# Patient Record
Sex: MALE | Race: WHITE | NOT HISPANIC OR LATINO | Employment: OTHER | URBAN - METROPOLITAN AREA
[De-identification: names, ages, dates, MRNs, and addresses within clinical notes are randomized per-mention and may not be internally consistent; named-entity substitution may affect disease eponyms.]

---

## 2017-06-12 ENCOUNTER — NON-PROVIDER VISIT (OUTPATIENT)
Dept: OCCUPATIONAL MEDICINE | Facility: CLINIC | Age: 82
End: 2017-06-12

## 2017-06-12 DIAGNOSIS — Z11.1 ENCOUNTER FOR PPD TEST: ICD-10-CM

## 2017-06-12 PROCEDURE — 86580 TB INTRADERMAL TEST: CPT | Performed by: PREVENTIVE MEDICINE

## 2017-06-12 NOTE — MR AVS SNAPSHOT
Joshua Nieves   2017 9:10 AM   Non-Provider Visit   MRN: [id number]    Department:  [facility]   Dept Phone:  [phone]    Description:  Male : 1933   Provider:  TORRIE HARRINGTON MA           Reason for Visit     PPD Placement           Allergies as of 2017     Not on File      You were diagnosed with     Encounter for PPD test   [447840]         Basic Information     Date Of Birth Sex Race Ethnicity Preferred Language    1933 Male White Non- English      Your appointments     2017  9:20 AM   Established Patient with Sourav Stevenson D.O.   [facility] ([facility])    [address]  [facility] [address] [phone]           You will be receiving a confirmation call a few days before your appointment from our automated call confirmation system.              Health Maintenance     Patient has no pending health maintenance at this time      Current Immunizations     Tuberculin Skin Test 2017  9:26 AM      Below and/or attached are the medications your provider expects you to take. Review all of your home medications and newly ordered medications with your provider and/or pharmacist. Follow medication instructions as directed by your provider and/or pharmacist. Please keep your medication list with you and share with your provider. Update the information when medications are discontinued, doses are changed, or new medications (including over-the-counter products) are added; and carry medication information at all times in the event of emergency situations     Allergies:  (Not on file)          Medications  Valid as of: 2017 - 11:03 AM    Generic Name Brand Name Tablet Size Instructions for use    .                 Medicines prescribed today were sent to:     None      Medication refill instructions:       If your prescription bottle indicates you have medication refills left, it is not necessary to call your provider’s office.  Please contact your pharmacy and they will refill your medication.    If your prescription bottle indicates you do not have any refills left, you may request refills at any time through one of the following ways: The online Azure Power system (except Urgent Care), by calling your provider’s office, or by asking your pharmacy to contact your provider’s office with a refill request. Medication refills are processed only during regular business hours and may not be available until the next business day. Your provider may request additional information or to have a follow-up visit with you prior to refilling your medication.   *Please Note: Medication refills are assigned a new Rx number when refilled electronically. Your pharmacy may indicate that no refills were authorized even though a new prescription for the same medication is available at the pharmacy. Please request the medicine by name with the pharmacy before contacting your provider for a refill.           Azure Power Access Code: [email or web address]  Expires: 7/12/2017 11:03 AM    Your email address is not on file at [facility].  Email Addresses are required for you to sign up for Azure Power, please contact [phone] to verify your personal information and to provide your email address prior to attempting to register for Azure Power.    Joshua Nieves  [address] [place] [address]    Azure Power  A secure, online tool to manage your health information     [facility]’s Azure Power® is a secure, online tool that connects you to your personalized health information from the privacy of your home -- day or night - making it very easy for you to manage your healthcare. Once the activation process is completed, you can even access your medical information using the Azure Power steve, which is available for free in the Apple Steve store or Google Play store.     To learn more about Azure Power, visit [email or web address] Power    There are two levels of access available (as shown below):    My Chart Features  [facility]  Specialists [facility]  Urgent  Care Non-[facility] Primary [facility] Doctor   Email your healthcare team securely and privately 24/7 X X X    Manage appointments: schedule your next appointment; view details of past/upcoming appointments X      Request prescription refills. X      View recent personal medical records, including lab and immunizations X X X X   View health record, including health history, allergies, medications X X X X   Read reports about your outpatient visits, procedures, consult and ER notes X X X X   See your discharge summary, which is a recap of your hospital and/or ER visit that includes your diagnosis, lab results, and care plan X X  X     How to register for Mowjow:  Once your e-mail address has been verified, follow the following steps to sign up for Mowjow.     1. Go to  [email or web address]  2. Click on the Sign Up Now box, which takes you to the New Member Sign Up page. You will need to provide the following information:  a. Enter your Mowjow Access Code exactly as it appears at the top of this page. (You will not need to use this code after you’ve completed the sign-up process. If you do not sign up before the expiration date, you must request a new code.)   b. Enter your date of birth.   c. Enter your home email address.   d. Click Submit, and follow the next screen’s instructions.  3. Create a Mowjow ID. This will be your Mowjow login ID and cannot be changed, so think of one that is secure and easy to remember.  4. Create a Mowjow password. You can change your password at any time.  5. Enter your Password Reset Question and Answer. This can be used at a later time if you forget your password.   6. Enter your e-mail address. This allows you to receive e-mail notifications when new information is available in Mowjow.  7. Click Sign Up. You can now view your health information.    For assistance activating your Mowjow account,  call [phone]

## 2017-06-14 ENCOUNTER — NON-PROVIDER VISIT (OUTPATIENT)
Dept: OCCUPATIONAL MEDICINE | Facility: CLINIC | Age: 82
End: 2017-06-14
Payer: MEDICARE

## 2017-06-14 LAB — TB WHEAL 3D P 5 TU DIAM: NORMAL MM

## 2017-06-19 ENCOUNTER — NON-PROVIDER VISIT (OUTPATIENT)
Dept: OCCUPATIONAL MEDICINE | Facility: CLINIC | Age: 82
End: 2017-06-19

## 2017-06-19 DIAGNOSIS — Z11.1 ENCOUNTER FOR PPD TEST: ICD-10-CM

## 2017-06-19 PROCEDURE — 86580 TB INTRADERMAL TEST: CPT | Performed by: PREVENTIVE MEDICINE

## 2017-06-21 ENCOUNTER — NON-PROVIDER VISIT (OUTPATIENT)
Dept: OCCUPATIONAL MEDICINE | Facility: CLINIC | Age: 82
End: 2017-06-21
Payer: MEDICARE

## 2017-06-21 DIAGNOSIS — Z11.1 ENCOUNTER FOR PPD SKIN TEST READING: ICD-10-CM

## 2017-06-21 LAB — TB WHEAL 3D P 5 TU DIAM: NORMAL MM

## 2017-06-22 ENCOUNTER — OFFICE VISIT (OUTPATIENT)
Dept: MEDICAL GROUP | Facility: CLINIC | Age: 82
End: 2017-06-22
Payer: MEDICARE

## 2017-06-22 VITALS
HEART RATE: 81 BPM | HEIGHT: 68 IN | SYSTOLIC BLOOD PRESSURE: 125 MMHG | DIASTOLIC BLOOD PRESSURE: 85 MMHG | RESPIRATION RATE: 18 BRPM | WEIGHT: 193.3 LBS | BODY MASS INDEX: 29.3 KG/M2 | OXYGEN SATURATION: 96 % | TEMPERATURE: 98.2 F

## 2017-06-22 DIAGNOSIS — Z76.89 ESTABLISHING CARE WITH NEW DOCTOR, ENCOUNTER FOR: ICD-10-CM

## 2017-06-22 DIAGNOSIS — Z98.84 HISTORY OF LAPAROSCOPIC ADJUSTABLE GASTRIC BANDING: ICD-10-CM

## 2017-06-22 DIAGNOSIS — I25.10 CARDIOVASCULAR DISEASE: ICD-10-CM

## 2017-06-22 DIAGNOSIS — E78.5 DYSLIPIDEMIA: ICD-10-CM

## 2017-06-22 DIAGNOSIS — I10 ESSENTIAL HYPERTENSION: ICD-10-CM

## 2017-06-22 DIAGNOSIS — R41.3 MEMORY CHANGES: ICD-10-CM

## 2017-06-22 PROCEDURE — 99203 OFFICE O/P NEW LOW 30 MIN: CPT | Performed by: INTERNAL MEDICINE

## 2017-06-22 RX ORDER — ATORVASTATIN CALCIUM 40 MG/1
40 TABLET, FILM COATED ORAL DAILY
Qty: 90 TAB | Refills: 3 | Status: SHIPPED | OUTPATIENT
Start: 2017-06-22 | End: 2017-08-08 | Stop reason: SDUPTHER

## 2017-06-22 RX ORDER — LOSARTAN POTASSIUM 50 MG/1
50 TABLET ORAL DAILY
Qty: 90 TAB | Refills: 3 | Status: SHIPPED | OUTPATIENT
Start: 2017-06-22 | End: 2017-06-22

## 2017-06-22 ASSESSMENT — PATIENT HEALTH QUESTIONNAIRE - PHQ9
CLINICAL INTERPRETATION OF PHQ2 SCORE: 6
5. POOR APPETITE OR OVEREATING: 0 - NOT AT ALL
SUM OF ALL RESPONSES TO PHQ QUESTIONS 1-9: 9

## 2017-06-22 NOTE — PROGRESS NOTES
CC: Joshua Nieves is a 84 y.o. male is suffering from   Chief Complaint   Patient presents with   • Annual Exam     Physical Exam          SUBJECTIVE:  1. Establishing care with new doctor, encounter for  Joshua is here for establishment of care, as previously been followed in [facility] by his previous physician. Patient has significant memory issues, is being cared for by his son who is helping to fill in his history.     2. Memory changes  Patient has a history of memory changes, is to be attending [employer]. Paperwork was completed today in the office regarding communicable diseases, 2 step TB test was previously completed and is negative.      3. Dyslipidemia  Patient has a history of dyslipidemia, has been on atorvastatin 40 mg, we've discussed that he has not been on this medication for over 2 months prescriptions were rewritten.     4. Essential hypertension  Patient with a history of essential hypertension, previously on Cozaar 50 mg. Patient's blood pressure today is totally adequate I recommended against restarting medication.     5. Cardiovascular disease  Patient is status post cardiac stents ×2, appears to be clinically stable has been followed by cardiology in [facility].     6. History of laparoscopic adjustable gastric banding  Patient with a history of gastric lap band, he appears to be clinically stable I've recommended screening for vitamin deficiencies        Past social, family, history:   Social History   Substance Use Topics   • Smoking status: Former Smoker     Quit date: 01/01/1987   • Smokeless tobacco: Never Used   • Alcohol Use: No         MEDICATIONS:    Current outpatient prescriptions:   •  atorvastatin (LIPITOR) 40 MG Tab, Take 1 Tab by mouth every day., Disp: 90 Tab, Rfl: 3    PROBLEMS:  There are no active problems to display for this patient.      REVIEW OF SYSTEMS:  Gen.:  No Nausea, Vomiting, fever, Chills.  Heart: No chest pain.  Lungs:  No  "shortness of Breath.  Psychological: History of dementia, Anjel unusual Anxiety depression     PHYSICAL EXAM   Constitutional: Alert, cooperative, not in acute distress.  Cardiovascular:  Rate Rhythm is regular without murmurs rubs clicks.     Thorax & Lungs: Clear to auscultation, no wheezing, rhonchi, or rales  HENT: Normocephalic, Atraumatic.  Eyes: PERRLA, EOMI, Conjunctiva normal.   Neck: Trachia is midline no swelling of the thyroid.   Lymphatic: No lymphadenopathy noted.   Neurologic: Alert & oriented x 3, cranial nerves II through XII are intact, Normal motor function, Normal sensory function, No focal deficits noted.   Psychiatric: Affect normal, Judgment normal, Mood normal.     VITAL SIGNS:/85 mmHg  Pulse 81  Temp(Src) 36.8 °C (98.2 °F)  Resp 18  Ht 1.727 m (5' 8\")  Wt 87.68 kg (193 lb 4.8 oz)  BMI 29.40 kg/m2  SpO2 96%    Labs: Reviewed    Assessment:                                                     Plan:    1. Establishing care with new doctor, encounter for  Patient appears to be clinically stable, no change in medical therapy except for the reinitiation of atorvastatin.     2. Memory changes  Etiology uncertain referral written to neurology  - COMP METABOLIC PANEL; Future  - CBC WITH DIFFERENTIAL; Future  - FREE THYROXINE; Future  - TSH; Future  - REFERRAL TO NEUROLOGY  - FREE THYROXINE; Future  - TSH; Future    3. Dyslipidemia  Patient with a history of dyslipidemia recheck cholesterol reinitiate Lipitor.   - atorvastatin (LIPITOR) 40 MG Tab; Take 1 Tab by mouth every day.  Dispense: 90 Tab; Refill: 3  - LIPID PROFILE; Future    4. Essential hypertension  No changes in medical therapy    5. Cardiovascular disease  Referral to cardiology  - REFERRAL TO CARDIOLOGY    6. History of laparoscopic adjustable gastric banding  Stable recheck vitamin D B12 checked comprehensive metabolic panel  - VITAMIN D,25 HYDROXY; Future  - VITAMIN B12; Future  - COMP METABOLIC PANEL; Future          "

## 2017-08-02 ENCOUNTER — HOSPITAL ENCOUNTER (OUTPATIENT)
Dept: LAB | Facility: MEDICAL CENTER | Age: 82
End: 2017-08-02
Attending: INTERNAL MEDICINE
Payer: MEDICARE

## 2017-08-02 DIAGNOSIS — Z98.84 HISTORY OF LAPAROSCOPIC ADJUSTABLE GASTRIC BANDING: ICD-10-CM

## 2017-08-02 DIAGNOSIS — R41.3 MEMORY CHANGES: ICD-10-CM

## 2017-08-02 DIAGNOSIS — E78.5 DYSLIPIDEMIA: ICD-10-CM

## 2017-08-02 LAB
25(OH)D3 SERPL-MCNC: 9 NG/ML (ref 30–100)
ALBUMIN SERPL BCP-MCNC: 3.7 G/DL (ref 3.2–4.9)
ALBUMIN/GLOB SERPL: 1.2 G/DL
ALP SERPL-CCNC: 73 U/L (ref 30–99)
ALT SERPL-CCNC: 9 U/L (ref 2–50)
ANION GAP SERPL CALC-SCNC: 10 MMOL/L (ref 0–11.9)
AST SERPL-CCNC: 14 U/L (ref 12–45)
BASOPHILS # BLD AUTO: 1.2 % (ref 0–1.8)
BASOPHILS # BLD: 0.07 K/UL (ref 0–0.12)
BILIRUB SERPL-MCNC: 0.6 MG/DL (ref 0.1–1.5)
BUN SERPL-MCNC: 13 MG/DL (ref 8–22)
CALCIUM SERPL-MCNC: 8.9 MG/DL (ref 8.5–10.5)
CHLORIDE SERPL-SCNC: 105 MMOL/L (ref 96–112)
CHOLEST SERPL-MCNC: 209 MG/DL (ref 100–199)
CO2 SERPL-SCNC: 25 MMOL/L (ref 20–33)
CREAT SERPL-MCNC: 1.11 MG/DL (ref 0.5–1.4)
EOSINOPHIL # BLD AUTO: 0.2 K/UL (ref 0–0.51)
EOSINOPHIL NFR BLD: 3.5 % (ref 0–6.9)
ERYTHROCYTE [DISTWIDTH] IN BLOOD BY AUTOMATED COUNT: 49.1 FL (ref 35.9–50)
GFR SERPL CREATININE-BSD FRML MDRD: >60 ML/MIN/1.73 M 2
GLOBULIN SER CALC-MCNC: 3 G/DL (ref 1.9–3.5)
GLUCOSE SERPL-MCNC: 81 MG/DL (ref 65–99)
HCT VFR BLD AUTO: 47.8 % (ref 42–52)
HDLC SERPL-MCNC: 36 MG/DL
HGB BLD-MCNC: 15.5 G/DL (ref 14–18)
IMM GRANULOCYTES # BLD AUTO: 0.02 K/UL (ref 0–0.11)
IMM GRANULOCYTES NFR BLD AUTO: 0.3 % (ref 0–0.9)
LDLC SERPL CALC-MCNC: 99 MG/DL
LYMPHOCYTES # BLD AUTO: 1.05 K/UL (ref 1–4.8)
LYMPHOCYTES NFR BLD: 18.2 % (ref 22–41)
MCH RBC QN AUTO: 32.6 PG (ref 27–33)
MCHC RBC AUTO-ENTMCNC: 32.4 G/DL (ref 33.7–35.3)
MCV RBC AUTO: 100.6 FL (ref 81.4–97.8)
MONOCYTES # BLD AUTO: 0.68 K/UL (ref 0–0.85)
MONOCYTES NFR BLD AUTO: 11.8 % (ref 0–13.4)
NEUTROPHILS # BLD AUTO: 3.76 K/UL (ref 1.82–7.42)
NEUTROPHILS NFR BLD: 65 % (ref 44–72)
NRBC # BLD AUTO: 0 K/UL
NRBC BLD AUTO-RTO: 0 /100 WBC
PLATELET # BLD AUTO: 186 K/UL (ref 164–446)
PMV BLD AUTO: 11.6 FL (ref 9–12.9)
POTASSIUM SERPL-SCNC: 4.3 MMOL/L (ref 3.6–5.5)
PROT SERPL-MCNC: 6.7 G/DL (ref 6–8.2)
RBC # BLD AUTO: 4.75 M/UL (ref 4.7–6.1)
SODIUM SERPL-SCNC: 140 MMOL/L (ref 135–145)
T4 FREE SERPL-MCNC: 0.79 NG/DL (ref 0.53–1.43)
TRIGL SERPL-MCNC: 370 MG/DL (ref 0–149)
TSH SERPL DL<=0.005 MIU/L-ACNC: 1.9 UIU/ML (ref 0.3–3.7)
VIT B12 SERPL-MCNC: 379 PG/ML (ref 211–911)
WBC # BLD AUTO: 5.8 K/UL (ref 4.8–10.8)

## 2017-08-02 PROCEDURE — 80053 COMPREHEN METABOLIC PANEL: CPT

## 2017-08-02 PROCEDURE — 36415 COLL VENOUS BLD VENIPUNCTURE: CPT

## 2017-08-02 PROCEDURE — 82306 VITAMIN D 25 HYDROXY: CPT

## 2017-08-02 PROCEDURE — 84443 ASSAY THYROID STIM HORMONE: CPT

## 2017-08-02 PROCEDURE — 80061 LIPID PANEL: CPT

## 2017-08-02 PROCEDURE — 84439 ASSAY OF FREE THYROXINE: CPT

## 2017-08-02 PROCEDURE — 82607 VITAMIN B-12: CPT

## 2017-08-02 PROCEDURE — 85025 COMPLETE CBC W/AUTO DIFF WBC: CPT

## 2017-08-03 ENCOUNTER — TELEPHONE (OUTPATIENT)
Dept: MEDICAL GROUP | Facility: CLINIC | Age: 82
End: 2017-08-03

## 2017-08-03 NOTE — TELEPHONE ENCOUNTER
ESTABLISHED PATIENT PRE-VISIT PLANNING     Note: Patient will not be contacted if there is no indication to call.     1.  Reviewed notes from the last few office visits within the medical group: Yes    2.  If any orders were placed at last visit or intended to be done for this visit (i.e. 6 mos follow-up), do we have Results/Consult Notes?        •  Labs - Labs ordered, completed and results are in chart.       •  Imaging - Imaging was not ordered at last office visit.       •  Referrals - Referral ordered, patient was seen and consult notes are in chart. Care Teams updated  YES. Neurology scheduled 10/19/17    3. Is this appointment scheduled as a Hospital Follow-Up? No    4.  Immunizations were updated in Epic using WebIZ?: No WebIZ record      5.  Patient is due for the following Health Maintenance Topics:   Health Maintenance Due   Topic Date Due   • Annual Wellness Visit  04/30/1933   • IMM DTaP/Tdap/Td Vaccine (1 - Tdap) 04/30/1952   • COLONOSCOPY  04/30/1983   • IMM ZOSTER VACCINE  04/30/1993   • IMM PNEUMOCOCCAL 65+ (ADULT) LOW/MEDIUM RISK SERIES (1 of 2 - PCV13) 04/30/1998           6.  Patient was NOT informed to arrive 15 min prior to their scheduled appointment and bring in their medication bottles.

## 2017-08-04 ENCOUNTER — HOSPITAL ENCOUNTER (OUTPATIENT)
Facility: MEDICAL CENTER | Age: 82
End: 2017-08-04
Attending: PHYSICIAN ASSISTANT
Payer: MEDICARE

## 2017-08-04 ENCOUNTER — OFFICE VISIT (OUTPATIENT)
Dept: MEDICAL GROUP | Facility: CLINIC | Age: 82
End: 2017-08-04
Payer: MEDICARE

## 2017-08-04 ENCOUNTER — OFFICE VISIT (OUTPATIENT)
Dept: URGENT CARE | Facility: CLINIC | Age: 82
End: 2017-08-04
Payer: MEDICARE

## 2017-08-04 VITALS
BODY MASS INDEX: 29.33 KG/M2 | OXYGEN SATURATION: 98 % | HEIGHT: 68 IN | RESPIRATION RATE: 18 BRPM | DIASTOLIC BLOOD PRESSURE: 74 MMHG | SYSTOLIC BLOOD PRESSURE: 122 MMHG | WEIGHT: 193.5 LBS | HEART RATE: 72 BPM | TEMPERATURE: 98.2 F

## 2017-08-04 VITALS
BODY MASS INDEX: 29.25 KG/M2 | HEIGHT: 68 IN | TEMPERATURE: 97.9 F | HEART RATE: 74 BPM | OXYGEN SATURATION: 98 % | DIASTOLIC BLOOD PRESSURE: 90 MMHG | SYSTOLIC BLOOD PRESSURE: 140 MMHG | WEIGHT: 193 LBS | RESPIRATION RATE: 16 BRPM

## 2017-08-04 DIAGNOSIS — R41.0 DISORIENTATION: ICD-10-CM

## 2017-08-04 DIAGNOSIS — E55.9 VITAMIN D DEFICIENCY DISEASE: ICD-10-CM

## 2017-08-04 DIAGNOSIS — R79.89 ABNORMAL CBC: ICD-10-CM

## 2017-08-04 DIAGNOSIS — R41.3 MEMORY CHANGE: ICD-10-CM

## 2017-08-04 DIAGNOSIS — R41.0 DISORIENTATION: Primary | ICD-10-CM

## 2017-08-04 DIAGNOSIS — Z86.59 HISTORY OF DEMENTIA: ICD-10-CM

## 2017-08-04 DIAGNOSIS — R46.89 BEHAVIOR CONCERN: ICD-10-CM

## 2017-08-04 DIAGNOSIS — G47.00 INSOMNIA, UNSPECIFIED TYPE: ICD-10-CM

## 2017-08-04 LAB
APPEARANCE UR: CLEAR
BILIRUB UR STRIP-MCNC: NORMAL MG/DL
COLOR UR AUTO: NORMAL
GLUCOSE UR STRIP.AUTO-MCNC: NORMAL MG/DL
KETONES UR STRIP.AUTO-MCNC: NORMAL MG/DL
LEUKOCYTE ESTERASE UR QL STRIP.AUTO: NORMAL
NITRITE UR QL STRIP.AUTO: NORMAL
PH UR STRIP.AUTO: 5 [PH] (ref 5–8)
PROT UR QL STRIP: 100 MG/DL
RBC UR QL AUTO: NORMAL
SP GR UR STRIP.AUTO: 1.02
UROBILINOGEN UR STRIP-MCNC: NORMAL MG/DL

## 2017-08-04 PROCEDURE — 99214 OFFICE O/P EST MOD 30 MIN: CPT | Performed by: INTERNAL MEDICINE

## 2017-08-04 PROCEDURE — 99204 OFFICE O/P NEW MOD 45 MIN: CPT | Performed by: PHYSICIAN ASSISTANT

## 2017-08-04 PROCEDURE — 87086 URINE CULTURE/COLONY COUNT: CPT

## 2017-08-04 PROCEDURE — 81002 URINALYSIS NONAUTO W/O SCOPE: CPT | Performed by: PHYSICIAN ASSISTANT

## 2017-08-04 RX ORDER — CIPROFLOXACIN 500 MG/1
500 TABLET, FILM COATED ORAL EVERY 12 HOURS
Qty: 14 TAB | Refills: 0 | Status: SHIPPED | OUTPATIENT
Start: 2017-08-04 | End: 2017-08-04 | Stop reason: CLARIF

## 2017-08-04 RX ORDER — TRAZODONE HYDROCHLORIDE 50 MG/1
50 TABLET ORAL NIGHTLY PRN
Qty: 30 TAB | Refills: 3 | Status: SHIPPED | OUTPATIENT
Start: 2017-08-04 | End: 2017-09-22

## 2017-08-04 NOTE — PROGRESS NOTES
CC: Joshua Nieves is a 84 y.o. male is suffering from   Chief Complaint   Patient presents with   • Follow-Up         SUBJECTIVE:  1. Insomnia, unspecified type  Joshua is here today accompanied by his son, we have discussed that he is having problems with insomnia, son is recommended that he restart trazodone which was done today.     2. Memory change  Patient continues to have memory issues, is in a  program, seems to really enjoy it.     3. Vitamin D deficiency disease  Patient will vitamin D deficiency recommended he start 1000 international units each day    4. Abnormal CBC  Patient with an abnormal CBC orders written to recheck this        Past social, family, history: , lives with his son very supportive  Social History   Substance Use Topics   • Smoking status: Former Smoker     Quit date: 01/01/1987   • Smokeless tobacco: Never Used   • Alcohol Use: No         MEDICATIONS:    Current outpatient prescriptions:   •  trazodone (DESYREL) 50 MG Tab, Take 1 Tab by mouth at bedtime as needed for Sleep., Disp: 30 Tab, Rfl: 3  •  atorvastatin (LIPITOR) 40 MG Tab, Take 1 Tab by mouth every day., Disp: 90 Tab, Rfl: 3    PROBLEMS:  Patient Active Problem List    Diagnosis Date Noted   • Memory change 08/04/2017   • Vitamin D deficiency disease 08/04/2017   • Abnormal CBC 08/04/2017       REVIEW OF SYSTEMS:  Gen.:  No Nausea, Vomiting, fever, Chills.  Heart: No chest pain.  Lungs:  No shortness of Breath.  Psychological: Anjel unusual Anxiety depression     PHYSICAL EXAM   Constitutional: Alert, cooperative, not in acute distress.  Cardiovascular:  Rate Rhythm is regular without murmurs rubs clicks.     Thorax & Lungs: Clear to auscultation, no wheezing, rhonchi, or rales  HENT: Normocephalic, Atraumatic.  Eyes: PERRLA, EOMI, Conjunctiva normal.   Neck: Trachia is midline no swelling of the thyroid.   Lymphatic: No lymphadenopathy noted.   Neurologic: Alert & oriented x 3, cranial nerves II through XII  "are intact, Normal motor function, Normal sensory function, No focal deficits noted.   Psychiatric: Affect normal, Judgment normal, Mood normal.     VITAL SIGNS:/74 mmHg  Pulse 72  Temp(Src) 36.8 °C (98.2 °F)  Resp 18  Ht 1.727 m (5' 8\")  Wt 87.771 kg (193 lb 8 oz)  BMI 29.43 kg/m2  SpO2 98%    Labs: Reviewed    Assessment:                                                     Plan:    1. Insomnia, unspecified type  Start trazodone  - trazodone (DESYREL) 50 MG Tab; Take 1 Tab by mouth at bedtime as needed for Sleep.  Dispense: 30 Tab; Refill: 3    2. Memory change  No change in medical therapy    3. Vitamin D deficiency disease  Recheck vitamin D levels  - VITAMIN D,25 HYDROXY; Future    4. Abnormal CBC  Check RBC folate serum B12  - FOLATE; Future  - VITAMIN B12; Future          "

## 2017-08-04 NOTE — MR AVS SNAPSHOT
"        Joshua Escaleras   2017 8:00 AM   Office Visit   MRN: [id number]    Department:  [facility]   Dept Phone:  [phone]    Description:  Male : 1933   Provider:  Sourav Stevenson D.O.           Reason for Visit     Follow-Up           Allergies as of 2017     Not on File      You were diagnosed with     Insomnia, unspecified type   [9341959]       Memory change   [156465]       Vitamin D deficiency disease   [073009]       Abnormal CBC   [492522]         Vital Signs     Blood Pressure Pulse Temperature Respirations Height Weight    122/74 mmHg 72 36.8 °C (98.2 °F) 18 1.727 m (5' 8\") 87.771 kg (193 lb 8 oz)    Body Mass Index Oxygen Saturation Smoking Status             29.43 kg/m2 98% Former Smoker         Basic Information     Date Of Birth Sex Race Ethnicity Preferred Language    1933 Male White Non- English      Your appointments     Aug 08, 2017  3:45 PM   NEW PATIENT with Ced Horton M.D.   [facility] (--)    [address], [address]  [facility] [id number] [phone]            Oct 19, 2017  1:20 PM   New Patient with Nima Hsieh M.D.   [facility] (--)    [address], [address]  [place] [id number] [phone]           Please bring Photo ID, Insurance Cards, All Medication Bottles and copies of any legal documents (such as Living Will, Power of ) If speaking a language besides English please bring an adult . Please arrive 30 minutes prior for check in and registration. You will be receiving a confirmation call a few days before your appointment from our automated call confirmation system.            2017  8:00 AM   Established Patient with Sourav Stevenson D.O.   [facility] ([facility])    [address]  [place] [address] [phone]           You will be receiving a confirmation call a few days before your appointment from our " automated call confirmation system.              Problem List              ICD-10-CM Priority Class Noted - Resolved    Memory change R41.3   8/4/2017 - Present    Vitamin D deficiency disease E55.9   8/4/2017 - Present    Abnormal CBC R79.89   8/4/2017 - Present      Health Maintenance        Date Due Completion Dates    IMM DTaP/Tdap/Td Vaccine (1 - Tdap) 4/30/1952 ---    COLONOSCOPY 4/30/1983 ---    IMM ZOSTER VACCINE 4/30/1993 ---    IMM PNEUMOCOCCAL 65+ (ADULT) LOW/MEDIUM RISK SERIES (1 of 2 - PCV13) 4/30/1998 ---    IMM INFLUENZA (1) 9/1/2017 ---            Current Immunizations     Tuberculin Skin Test 6/19/2017  9:52 AM, 6/12/2017  9:26 AM      Below and/or attached are the medications your provider expects you to take. Review all of your home medications and newly ordered medications with your provider and/or pharmacist. Follow medication instructions as directed by your provider and/or pharmacist. Please keep your medication list with you and share with your provider. Update the information when medications are discontinued, doses are changed, or new medications (including over-the-counter products) are added; and carry medication information at all times in the event of emergency situations     Allergies:  No Known Allergies          Medications  Valid as of: August 04, 2017 -  8:33 AM    Generic Name Brand Name Tablet Size Instructions for use    Atorvastatin Calcium (Tab) LIPITOR 40 MG Take 1 Tab by mouth every day.        TraZODone HCl (Tab) DESYREL 50 MG Take 1 Tab by mouth at bedtime as needed for Sleep.        .                 Medicines prescribed today were sent to:     [id number]  [place] - [address]    [address] [place] [address]    Phone: [phone] Fax: [phone]    Open 24 Hours?: No      Medication refill instructions:       If your prescription bottle indicates you have medication refills left, it is not necessary to call your provider’s office. Please contact  your pharmacy and they will refill your medication.    If your prescription bottle indicates you do not have any refills left, you may request refills at any time through one of the following ways: The online MiFi system (except Urgent Care), by calling your provider’s office, or by asking your pharmacy to contact your provider’s office with a refill request. Medication refills are processed only during regular business hours and may not be available until the next business day. Your provider may request additional information or to have a follow-up visit with you prior to refilling your medication.   *Please Note: Medication refills are assigned a new Rx number when refilled electronically. Your pharmacy may indicate that no refills were authorized even though a new prescription for the same medication is available at the pharmacy. Please request the medicine by name with the pharmacy before contacting your provider for a refill.        Your To Do List     Future Labs/Procedures Complete By Expires    FOLATE  As directed 8/4/2018    VITAMIN B12  As directed 8/4/2018    VITAMIN D,25 HYDROXY  As directed 8/5/2018         MiFi Access Code: Activation code not generated  Current MiFi Status: Active

## 2017-08-04 NOTE — MR AVS SNAPSHOT
"        Joshua MAEVE Ezequiel   2017 5:15 PM   Office Visit   MRN: [id number]    Department:  [facility]   Dept Phone:  [phone]    Description:  Male : 1933   Provider:  Jacoby Singletary PA-C           Reason for Visit     Dysuria           Allergies as of 2017     Not on File      You were diagnosed with     Disorientation   [148063]  -  Primary     Behavior concern   [427916]       History of dementia   [9399670]         Vital Signs     Blood Pressure Pulse Temperature Respirations Height Weight    140/90 mmHg 74 36.6 °C (97.9 °F) 16 1.727 m (5' 8\") 87.544 kg (193 lb)    Body Mass Index Oxygen Saturation Smoking Status             29.35 kg/m2 98% Former Smoker         Basic Information     Date Of Birth Sex Race Ethnicity Preferred Language    1933 Male White Non- English      Your appointments     Aug 08, 2017  3:45 PM   NEW PATIENT with Ced Horton M.D.   [facility] (--)    [address] [facility] [address]  [place] [id number] [phone]            Oct 19, 2017  1:20 PM   New Patient with Nima Hsieh M.D.   [facility] (--)    [address] [facility], [address]  [place] [id number] [phone]           Please bring Photo ID, Insurance Cards, All Medication Bottles and copies of any legal documents (such as Living Will, Power of ) If speaking a language besides English please bring an adult . Please arrive 30 minutes prior for check in and registration. You will be receiving a confirmation call a few days before your appointment from our automated call confirmation system.            2017  8:00 AM   Established Patient with Sourav Stevenson D.O.   [facility] ([facility])    [address] [facility] [address]  [place] [id number] [phone]           You will be receiving a confirmation call a few days before your appointment from our automated call confirmation system.              "   Problem List              ICD-10-CM Priority Class Noted - Resolved    Memory change R41.3   8/4/2017 - Present    Vitamin D deficiency disease E55.9   8/4/2017 - Present    Abnormal CBC R79.89   8/4/2017 - Present      Health Maintenance        Date Due Completion Dates    IMM DTaP/Tdap/Td Vaccine (1 - Tdap) 4/30/1952 ---    COLONOSCOPY 4/30/1983 ---    IMM ZOSTER VACCINE 4/30/1993 ---    IMM PNEUMOCOCCAL 65+ (ADULT) LOW/MEDIUM RISK SERIES (1 of 2 - PCV13) 4/30/1998 ---    IMM INFLUENZA (1) 9/1/2017 ---            Results     POCT Urinalysis      Component Value Standard Range & Units    POC Color DEANA Negative    POC Appearance CLEAR Negative    POC Leukocyte Esterase NEG Negative    POC Nitrites NEG Negative    POC Urobiligen NEG Negative (0.2) mg/dL    POC Protein 100 Negative mg/dL    POC Urine PH 5.0 5.0 - 8.0    POC Blood NEG Negative    POC Specific Gravity 1.020 <1.005 - >1.030    POC Ketones NEG Negative mg/dL    POC Biliruben NEG Negative mg/dL    POC Glucose NEG Negative mg/dL                        Current Immunizations     Tuberculin Skin Test 6/19/2017  9:52 AM, 6/12/2017  9:26 AM      Below and/or attached are the medications your provider expects you to take. Review all of your home medications and newly ordered medications with your provider and/or pharmacist. Follow medication instructions as directed by your provider and/or pharmacist. Please keep your medication list with you and share with your provider. Update the information when medications are discontinued, doses are changed, or new medications (including over-the-counter products) are added; and carry medication information at all times in the event of emergency situations     Allergies:  No Known Allergies          Medications  Valid as of: August 04, 2017 -  7:07 PM    Generic Name Brand Name Tablet Size Instructions for use    Atorvastatin Calcium (Tab) LIPITOR 40 MG Take 1 Tab by mouth every day.        TraZODone HCl (Tab) DESYREL  50 MG Take 1 Tab by mouth at bedtime as needed for Sleep.        .                 Medicines prescribed today were sent to:     [id number] - [place], [place] - [address]    [address] [place] [address]    Phone: [phone] Fax: [phone]    Open 24 Hours?: No      Medication refill instructions:       If your prescription bottle indicates you have medication refills left, it is not necessary to call your provider’s office. Please contact your pharmacy and they will refill your medication.    If your prescription bottle indicates you do not have any refills left, you may request refills at any time through one of the following ways: The online [employer] Interactive system (except Urgent Care), by calling your provider’s office, or by asking your pharmacy to contact your provider’s office with a refill request. Medication refills are processed only during regular business hours and may not be available until the next business day. Your provider may request additional information or to have a follow-up visit with you prior to refilling your medication.   *Please Note: Medication refills are assigned a new Rx number when refilled electronically. Your pharmacy may indicate that no refills were authorized even though a new prescription for the same medication is available at the pharmacy. Please request the medicine by name with the pharmacy before contacting your provider for a refill.        Your To Do List     Future Labs/Procedures Complete By Expires    Urine Culture  As directed 8/4/2018      Referral     A referral request has been sent to our patient care coordination department. Please allow 3-5 business days for us to process this request and contact you either by phone or mail. If you do not hear from us by the 5th business day, please call us at [phone].        Instructions      Urinary Tract Infection  A urinary tract infection (UTI) can occur any place along the urinary tract. The tract includes the  kidneys, ureters, bladder, and urethra. A type of germ called bacteria often causes a UTI. UTIs are often helped with antibiotic medicine.   HOME CARE   · If given, take antibiotics as told by your doctor. Finish them even if you start to feel better.  · Drink enough fluids to keep your pee (urine) clear or pale yellow.  · Avoid tea, drinks with caffeine, and bubbly (carbonated) drinks.  · Pee often. Avoid holding your pee in for a long time.  · Pee before and after having sex (intercourse).  · Wipe from front to back after you poop (bowel movement) if you are a woman. Use each tissue only once.  GET HELP RIGHT AWAY IF:   · You have back pain.  · You have lower belly (abdominal) pain.  · You have chills.  · You feel sick to your stomach (nauseous).  · You throw up (vomit).  · Your burning or discomfort with peeing does not go away.  · You have a fever.  · Your symptoms are not better in 3 days.  MAKE SURE YOU:   · Understand these instructions.  · Will watch your condition.  · Will get help right away if you are not doing well or get worse.     This information is not intended to replace advice given to you by your health care provider. Make sure you discuss any questions you have with your health care provider.     Document Released: 06/05/2009 Document Revised: 01/08/2016 Document Reviewed: 07/18/2013  [employer] Interactive Patient Education ©2016 [employer] Inc.            [employer] Access Code: Activation code not generated  Current [employer] Status: Active

## 2017-08-05 NOTE — PROGRESS NOTES
Subjective:      Pt is a 84 y.o. male who presents with Dysuria            Dysuria   This is a new problem. The current episode started yesterday. The problem occurs every urination. The problem has been gradually worsening. The quality of the pain is described as aching and burning. The pain is at a severity of 5/10. The pain is moderate. There has been no fever. He has tried increased fluids for the symptoms. The treatment provided no relief. His past medical history is significant for recurrent UTIs. There is no history of catheterization, kidney stones, a single kidney, urinary stasis or a urological procedure.   PT comes into the  with a chief complaint of mental status changes and mild dysuria x 2 weeks. PT denies fevers or chills, CP, SOB, NVD, paresthesias, headaches, dizziness, change in vision, hives, or joint pain. PT states the pain is a 5/10 with urination, aching in nature and worse at night. He states he has not taken any meds for this issue. He denies flank or back pain as well. His son notes pt has dementia but has had Mental Status changes at his nursing home most likely due to not having his medications for CARD, Neuro and dementia x 2 months due to error at nursing home. The pt's medication list, problem list, and allergies have been evaluated and reviewed during today's visit.        PMH:  Past Medical History   Diagnosis Date   • Memory change 8/4/2017   • Vitamin D deficiency disease 8/4/2017   • Abnormal CBC 8/4/2017       PSH:  History reviewed. No pertinent past surgical history.    Fam Hx:  the patient's family history is not pertinent to their current complaint        Soc HX:  Social History     Social History   • Marital Status:      Spouse Name: N/A   • Number of Children: N/A   • Years of Education: N/A     Occupational History   • Not on file.     Social History Main Topics   • Smoking status: Former Smoker     Quit date: 01/01/1987   • Smokeless tobacco: Never Used   •  Alcohol Use: No   • Drug Use: No   • Sexual Activity: No     Other Topics Concern   • Not on file     Social History Narrative         Medications:    Current outpatient prescriptions:   •  ciprofloxacin (CIPRO) 500 MG Tab, Take 1 Tab by mouth every 12 hours for 7 days., Disp: 14 Tab, Rfl: 0  •  trazodone (DESYREL) 50 MG Tab, Take 1 Tab by mouth at bedtime as needed for Sleep., Disp: 30 Tab, Rfl: 3  •  atorvastatin (LIPITOR) 40 MG Tab, Take 1 Tab by mouth every day., Disp: 90 Tab, Rfl: 3      Allergies:  Review of patient's allergies indicates not on file.        Review of Systems   Genitourinary: Positive for dysuria.   Constitutional: Negative for fever, chills and malaise/fatigue.   HENT: Negative for congestion and sore throat.    Eyes: Negative for blurred vision, double vision and photophobia.   Respiratory: Negative for cough and shortness of breath.    Cardiovascular: Negative for chest pain and palpitations.   Gastrointestinal: Negative for heartburn, nausea, vomiting, abdominal pain, diarrhea and constipation.   Musculoskeletal: Negative for joint pain and myalgias.   Skin: Negative for itching and rash.   Neurological: Negative for dizziness, tingling and headaches.   Endo/Heme/Allergies: Does not bruise/bleed easily.   Psychiatric/Behavioral: Negative for depression. The patient is not nervous/anxious.             Objective:     VSS, AF    Physical Exam      Constitutional: PT is oriented to person, place, and time. PT appears well-developed and well-nourished. No distress.   HENT:   Head: Normocephalic and atraumatic.   Mouth/Throat: Oropharynx is clear and moist. No oropharyngeal exudate.   Eyes: Conjunctivae normal and EOM are normal. Pupils are equal, round, and reactive to light.   Neck: Normal range of motion. Neck supple. No thyromegaly present.   Cardiovascular: Normal rate, regular rhythm, normal heart sounds and intact distal pulses.  Exam reveals no gallop and no friction rub.    No murmur  heard.  Pulmonary/Chest: Effort normal and breath sounds normal. No respiratory distress. PT has no wheezes. PT has no rales. Pt exhibits no tenderness.   Abdominal: Soft. Bowel sounds are normal. PT exhibits no distension and no mass. There is no tenderness. There is no rebound and no guarding.   Musculoskeletal: Normal range of motion. PT exhibits no edema and no tenderness.   Neurological: PT is alert and oriented to person, place, and time. PT has normal reflexes. No cranial nerve deficit.   Skin: Skin is warm and dry. No rash noted. PT is not diaphoretic. No erythema.       Psychiatric: PT has a normal mood and affect. PT behavior is normal. Judgment and thought content normal.          Assessment/Plan:     1. Mental status changes - acute      - POCT Urinalysis-->protein      Pt to follow up with [facility] in 4 days and I made a stat referral to Neurology  Rest, fluids encouraged.  AVS with medical info given.  Pt was in full understanding and agreement with the plan.  Follow-up as needed if symptoms worsen or fail to improve.

## 2017-08-05 NOTE — PATIENT INSTRUCTIONS
Urinary Tract Infection  A urinary tract infection (UTI) can occur any place along the urinary tract. The tract includes the kidneys, ureters, bladder, and urethra. A type of germ called bacteria often causes a UTI. UTIs are often helped with antibiotic medicine.   HOME CARE   · If given, take antibiotics as told by your doctor. Finish them even if you start to feel better.  · Drink enough fluids to keep your pee (urine) clear or pale yellow.  · Avoid tea, drinks with caffeine, and bubbly (carbonated) drinks.  · Pee often. Avoid holding your pee in for a long time.  · Pee before and after having sex (intercourse).  · Wipe from front to back after you poop (bowel movement) if you are a woman. Use each tissue only once.  GET HELP RIGHT AWAY IF:   · You have back pain.  · You have lower belly (abdominal) pain.  · You have chills.  · You feel sick to your stomach (nauseous).  · You throw up (vomit).  · Your burning or discomfort with peeing does not go away.  · You have a fever.  · Your symptoms are not better in 3 days.  MAKE SURE YOU:   · Understand these instructions.  · Will watch your condition.  · Will get help right away if you are not doing well or get worse.     This information is not intended to replace advice given to you by your health care provider. Make sure you discuss any questions you have with your health care provider.     Document Released: 06/05/2009 Document Revised: 01/08/2016 Document Reviewed: 07/18/2013  [employer] Interactive Patient Education ©2016 [employer]

## 2017-08-06 LAB
BACTERIA UR CULT: NORMAL
SIGNIFICANT IND 70042: NORMAL
SOURCE SOURCE: NORMAL

## 2017-08-08 ENCOUNTER — OFFICE VISIT (OUTPATIENT)
Dept: CARDIOLOGY | Facility: MEDICAL CENTER | Age: 82
End: 2017-08-08
Payer: MEDICARE

## 2017-08-08 VITALS
HEART RATE: 88 BPM | DIASTOLIC BLOOD PRESSURE: 80 MMHG | HEIGHT: 68 IN | OXYGEN SATURATION: 94 % | WEIGHT: 197 LBS | SYSTOLIC BLOOD PRESSURE: 138 MMHG | BODY MASS INDEX: 29.86 KG/M2

## 2017-08-08 DIAGNOSIS — E78.00 PURE HYPERCHOLESTEROLEMIA: ICD-10-CM

## 2017-08-08 DIAGNOSIS — I20.89 ANGINA AT REST: ICD-10-CM

## 2017-08-08 DIAGNOSIS — I25.10 CARDIOVASCULAR DISEASE: ICD-10-CM

## 2017-08-08 DIAGNOSIS — E78.5 DYSLIPIDEMIA: ICD-10-CM

## 2017-08-08 DIAGNOSIS — I25.10 CORONARY ARTERY DISEASE INVOLVING NATIVE CORONARY ARTERY OF NATIVE HEART WITHOUT ANGINA PECTORIS: ICD-10-CM

## 2017-08-08 DIAGNOSIS — I25.119 ATHEROSCLEROSIS OF NATIVE CORONARY ARTERY WITH ANGINA PECTORIS, UNSPECIFIED WHETHER NATIVE OR TRANSPLANTED HEART (HCC): ICD-10-CM

## 2017-08-08 PROCEDURE — 99204 OFFICE O/P NEW MOD 45 MIN: CPT | Performed by: INTERNAL MEDICINE

## 2017-08-08 RX ORDER — ATORVASTATIN CALCIUM 40 MG/1
40 TABLET, FILM COATED ORAL DAILY
Qty: 90 TAB | Refills: 3 | Status: SHIPPED | OUTPATIENT
Start: 2017-08-08 | End: 2018-08-01

## 2017-08-08 RX ORDER — DILTIAZEM HYDROCHLORIDE 120 MG/1
120 CAPSULE, COATED, EXTENDED RELEASE ORAL DAILY
Qty: 30 CAP | Refills: 11 | Status: SHIPPED | OUTPATIENT
Start: 2017-08-08 | End: 2018-08-01

## 2017-08-08 ASSESSMENT — ENCOUNTER SYMPTOMS
NEUROLOGICAL NEGATIVE: 1
DIZZINESS: 0
EYES NEGATIVE: 1
WEAKNESS: 0
SPUTUM PRODUCTION: 0
HEMOPTYSIS: 0
STRIDOR: 0
GASTROINTESTINAL NEGATIVE: 1
ORTHOPNEA: 0
PALPITATIONS: 0
WHEEZING: 0
LOSS OF CONSCIOUSNESS: 0
SORE THROAT: 0
CLAUDICATION: 0
RESPIRATORY NEGATIVE: 1
PND: 0
MUSCULOSKELETAL NEGATIVE: 1
SHORTNESS OF BREATH: 0
COUGH: 0
FEVER: 0
CONSTITUTIONAL NEGATIVE: 1
CHILLS: 0
BRUISES/BLEEDS EASILY: 0

## 2017-08-08 NOTE — Clinical Note
[facility]   [address]  [place] [address]  Phone: [phone]  Fax: [phone]              Joshua Nieves  4/30/1933    Encounter Date: 8/8/2017    Ced Horton M.D.          PROGRESS NOTE:  Subjective:   Joshua Nieves is a 84 y.o. male who presents today as a new consultation for chest pain. He has a past history of CAD status post stents in the past. Done in [place]. He follows with a cardiologist down there. He also has a neurologist for his Alzheimer's disease. He was moved up here by his son is now taking care of him. All of his medications were stopped and he is now only taking a daily aspirin. Reviewing his records he had a stenting of the mid LAD with a 2.7 by by 12 drug loading stent on 10/1/2013. A repeat coronary angiogram on 12/19/2013 showed no interval restenosis but did have a new left circumflex stenosis with a 3.25 x 18 drug-eluting stent being placed at that time. An echocardiogram at one point showed a EF of 50% with subtle apical wall hypokinesis.  Since today with memory issues but as far as I can tell is having chest pain described as a pressure-like sensation across his midchest occurring 1-2 times per day. He's been stopped off all his medications. He's having problems with wakefulness during the data set is most concerned about. He is also pending evaluation by a neurologist.    Past Medical History   Diagnosis Date   • Memory change 8/4/2017   • Vitamin D deficiency disease 8/4/2017   • Abnormal CBC 8/4/2017     History reviewed. No pertinent past surgical history.  History reviewed. No pertinent family history.  History   Smoking status   • Former Smoker   • Quit date: 01/01/1987   Smokeless tobacco   • Never Used     No Known Allergies  Outpatient Encounter Prescriptions as of 8/8/2017   Medication Sig Dispense Refill   • atorvastatin (LIPITOR) 40 MG Tab Take 1 Tab by mouth every day. 90 Tab 3   • diltiazem  "CD (CARDIZEM CD) 120 MG CAPSULE SR 24 HR Take 1 Cap by mouth every day. 30 Cap 11   • trazodone (DESYREL) 50 MG Tab Take 1 Tab by mouth at bedtime as needed for Sleep. 30 Tab 3   • [DISCONTINUED] atorvastatin (LIPITOR) 40 MG Tab Take 1 Tab by mouth every day. 90 Tab 3     No facility-administered encounter medications on file as of 8/8/2017.     Review of Systems   Constitutional: Negative.  Negative for fever, chills and malaise/fatigue.   HENT: Negative.  Negative for sore throat.    Eyes: Negative.    Respiratory: Negative.  Negative for cough, hemoptysis, sputum production, shortness of breath, wheezing and stridor.    Cardiovascular: Positive for chest pain. Negative for palpitations, orthopnea, claudication, leg swelling and PND.   Gastrointestinal: Negative.    Genitourinary: Negative.    Musculoskeletal: Negative.    Skin: Negative.    Neurological: Negative.  Negative for dizziness, loss of consciousness and weakness.   Endo/Heme/Allergies: Negative.  Does not bruise/bleed easily.   All other systems reviewed and are negative.       Objective:   /80 mmHg  Pulse 88  Ht 1.727 m (5' 7.99\")  Wt 89.359 kg (197 lb)  BMI 29.96 kg/m2  SpO2 94%    Physical Exam   Constitutional: He is oriented to person, place, and time. He appears well-developed and well-nourished. No distress.   HENT:   Head: Normocephalic.   Mouth/Throat: Oropharynx is clear and moist.   Eyes: EOM are normal. Pupils are equal, round, and reactive to light. Right eye exhibits no discharge. Left eye exhibits no discharge. No scleral icterus.   Neck: Normal range of motion. Neck supple. No JVD present. No tracheal deviation present.   Cardiovascular: Normal rate, regular rhythm, S1 normal, S2 normal, normal heart sounds, intact distal pulses and normal pulses.  Exam reveals no gallop, no S3, no S4 and no friction rub.    No murmur heard.   No systolic murmur is present    No diastolic murmur is present   Pulses:       Carotid pulses are " 2+ on the right side, and 2+ on the left side.       Radial pulses are 2+ on the right side, and 2+ on the left side.        Dorsalis pedis pulses are 2+ on the right side, and 2+ on the left side.        Posterior tibial pulses are 2+ on the right side, and 2+ on the left side.   Pulmonary/Chest: Effort normal and breath sounds normal. No respiratory distress. He has no wheezes. He has no rales.   Abdominal: Soft. Bowel sounds are normal. He exhibits no distension and no mass. There is no tenderness. There is no rebound and no guarding.   Musculoskeletal: He exhibits no edema.   Neurological: He is alert and oriented to person, place, and time. No cranial nerve deficit.   Skin: Skin is warm and dry. He is not diaphoretic. No pallor.   Psychiatric: He has a normal mood and affect. His behavior is normal. Judgment and thought content normal.   Nursing note and vitals reviewed.      Assessment:     1. Atherosclerosis of native coronary artery with angina pectoris, unspecified whether native or transplanted heart (CMS-[facility])     2. Cardiovascular disease  EKG    diltiazem CD (CARDIZEM CD) 120 MG CAPSULE SR 24 HR   3. Dyslipidemia  atorvastatin (LIPITOR) 40 MG Tab    diltiazem CD (CARDIZEM CD) 120 MG CAPSULE SR 24 HR   4. Angina at rest (CMS-HCC)  diltiazem CD (CARDIZEM CD) 120 MG CAPSULE SR 24 HR   5. Coronary artery disease involving native coronary artery of native heart without angina pectoris     6. Pure hypercholesterolemia         Medical Decision Making:  Today's Assessment / Status / Plan:     84-year-old male with Alzheimer's dementia of moderate degree and likely anginal chest pain. I discussed the risks and benefits of all treatment modalities with the son and we both agree that conservative medical management would be the best course of action. Based on his daytime fatigue I don't want to start him on a beta blocker but will instead start diltiazem of 120 per day. I will also restart the atorvastatin.    1.  CAD s/p stent    - start asa 81    - start atorva 40    - start dilt     2. HLD    - per above    3. Alzhiemers    - defer    Thank for you allowing me to take part in your patient's care, please call should you have any questions or would like to discuss this patient.      NASH DavisO.  [address]  [address]  [facility] [address]  VIA In Basket

## 2017-08-08 NOTE — PROGRESS NOTES
Subjective:   Joshua Nieves is a 84 y.o. male who presents today as a new consultation for chest pain. He has a past history of CAD status post stents in the past. Done in [place]. He follows with a cardiologist down there. He also has a neurologist for his Alzheimer's disease. He was moved up here by his son is now taking care of him. All of his medications were stopped and he is now only taking a daily aspirin. Reviewing his records he had a stenting of the mid LAD with a 2.7 by by 12 drug loading stent on 10/1/2013. A repeat coronary angiogram on 12/19/2013 showed no interval restenosis but did have a new left circumflex stenosis with a 3.25 x 18 drug-eluting stent being placed at that time. An echocardiogram at one point showed a EF of 50% with subtle apical wall hypokinesis.  Since today with memory issues but as far as I can tell is having chest pain described as a pressure-like sensation across his midchest occurring 1-2 times per day. He's been stopped off all his medications. He's having problems with wakefulness during the data set is most concerned about. He is also pending evaluation by a neurologist.    Past Medical History   Diagnosis Date   • Memory change 8/4/2017   • Vitamin D deficiency disease 8/4/2017   • Abnormal CBC 8/4/2017     History reviewed. No pertinent past surgical history.  History reviewed. No pertinent family history.  History   Smoking status   • Former Smoker   • Quit date: 01/01/1987   Smokeless tobacco   • Never Used     No Known Allergies  Outpatient Encounter Prescriptions as of 8/8/2017   Medication Sig Dispense Refill   • atorvastatin (LIPITOR) 40 MG Tab Take 1 Tab by mouth every day. 90 Tab 3   • diltiazem CD (CARDIZEM CD) 120 MG CAPSULE SR 24 HR Take 1 Cap by mouth every day. 30 Cap 11   • trazodone (DESYREL) 50 MG Tab Take 1 Tab by mouth at bedtime as needed for Sleep. 30 Tab 3   • [DISCONTINUED] atorvastatin (LIPITOR) 40 MG Tab Take 1 Tab by mouth every day. 90 Tab  "3     No facility-administered encounter medications on file as of 8/8/2017.     Review of Systems   Constitutional: Negative.  Negative for fever, chills and malaise/fatigue.   HENT: Negative.  Negative for sore throat.    Eyes: Negative.    Respiratory: Negative.  Negative for cough, hemoptysis, sputum production, shortness of breath, wheezing and stridor.    Cardiovascular: Positive for chest pain. Negative for palpitations, orthopnea, claudication, leg swelling and PND.   Gastrointestinal: Negative.    Genitourinary: Negative.    Musculoskeletal: Negative.    Skin: Negative.    Neurological: Negative.  Negative for dizziness, loss of consciousness and weakness.   Endo/Heme/Allergies: Negative.  Does not bruise/bleed easily.   All other systems reviewed and are negative.       Objective:   /80 mmHg  Pulse 88  Ht 1.727 m (5' 7.99\")  Wt 89.359 kg (197 lb)  BMI 29.96 kg/m2  SpO2 94%    Physical Exam   Constitutional: He is oriented to person, place, and time. He appears well-developed and well-nourished. No distress.   HENT:   Head: Normocephalic.   Mouth/Throat: Oropharynx is clear and moist.   Eyes: EOM are normal. Pupils are equal, round, and reactive to light. Right eye exhibits no discharge. Left eye exhibits no discharge. No scleral icterus.   Neck: Normal range of motion. Neck supple. No JVD present. No tracheal deviation present.   Cardiovascular: Normal rate, regular rhythm, S1 normal, S2 normal, normal heart sounds, intact distal pulses and normal pulses.  Exam reveals no gallop, no S3, no S4 and no friction rub.    No murmur heard.   No systolic murmur is present    No diastolic murmur is present   Pulses:       Carotid pulses are 2+ on the right side, and 2+ on the left side.       Radial pulses are 2+ on the right side, and 2+ on the left side.        Dorsalis pedis pulses are 2+ on the right side, and 2+ on the left side.        Posterior tibial pulses are 2+ on the right side, and 2+ on the " left side.   Pulmonary/Chest: Effort normal and breath sounds normal. No respiratory distress. He has no wheezes. He has no rales.   Abdominal: Soft. Bowel sounds are normal. He exhibits no distension and no mass. There is no tenderness. There is no rebound and no guarding.   Musculoskeletal: He exhibits no edema.   Neurological: He is alert and oriented to person, place, and time. No cranial nerve deficit.   Skin: Skin is warm and dry. He is not diaphoretic. No pallor.   Psychiatric: He has a normal mood and affect. His behavior is normal. Judgment and thought content normal.   Nursing note and vitals reviewed.      Assessment:     1. Atherosclerosis of native coronary artery with angina pectoris, unspecified whether native or transplanted heart (CMS-HCC)     2. Cardiovascular disease  EKG    diltiazem CD (CARDIZEM CD) 120 MG CAPSULE SR 24 HR   3. Dyslipidemia  atorvastatin (LIPITOR) 40 MG Tab    diltiazem CD (CARDIZEM CD) 120 MG CAPSULE SR 24 HR   4. Angina at rest (CMS-[facility])  diltiazem CD (CARDIZEM CD) 120 MG CAPSULE SR 24 HR   5. Coronary artery disease involving native coronary artery of native heart without angina pectoris     6. Pure hypercholesterolemia         Medical Decision Making:  Today's Assessment / Status / Plan:     84-year-old male with Alzheimer's dementia of moderate degree and likely anginal chest pain. I discussed the risks and benefits of all treatment modalities with the son and we both agree that conservative medical management would be the best course of action. Based on his daytime fatigue I don't want to start him on a beta blocker but will instead start diltiazem of 120 per day. I will also restart the atorvastatin.    1. CAD s/p stent    - start asa 81    - start atorva 40    - start dilt     2. HLD    - per above    3. Alzhiemers    - defer    Thank for you allowing me to take part in your patient's care, please call should you have any questions or would like to discuss this  patient.

## 2017-08-11 ENCOUNTER — OFFICE VISIT (OUTPATIENT)
Dept: NEUROLOGY | Facility: MEDICAL CENTER | Age: 82
End: 2017-08-11
Payer: MEDICARE

## 2017-08-11 VITALS
OXYGEN SATURATION: 95 % | TEMPERATURE: 98.8 F | SYSTOLIC BLOOD PRESSURE: 120 MMHG | WEIGHT: 189.9 LBS | HEART RATE: 72 BPM | BODY MASS INDEX: 28.78 KG/M2 | DIASTOLIC BLOOD PRESSURE: 82 MMHG | HEIGHT: 68 IN

## 2017-08-11 DIAGNOSIS — F03.91 DEMENTIA WITH BEHAVIORAL DISTURBANCE, UNSPECIFIED DEMENTIA TYPE: ICD-10-CM

## 2017-08-11 DIAGNOSIS — R41.3 MEMORY CHANGE: ICD-10-CM

## 2017-08-11 PROBLEM — F03.918 DEMENTIA WITH BEHAVIORAL DISTURBANCE (HCC): Status: ACTIVE | Noted: 2017-08-11

## 2017-08-11 PROCEDURE — 99204 OFFICE O/P NEW MOD 45 MIN: CPT | Performed by: PSYCHIATRY & NEUROLOGY

## 2017-08-11 RX ORDER — QUETIAPINE FUMARATE 25 MG/1
25 TABLET, FILM COATED ORAL 2 TIMES DAILY
Qty: 60 TAB | Refills: 4 | Status: SHIPPED | OUTPATIENT
Start: 2017-08-11 | End: 2017-10-19 | Stop reason: SDUPTHER

## 2017-08-11 RX ORDER — MEMANTINE HYDROCHLORIDE 10 MG/1
10 TABLET ORAL 2 TIMES DAILY
Qty: 60 TAB | Refills: 3 | Status: SHIPPED | OUTPATIENT
Start: 2017-08-11 | End: 2017-10-19 | Stop reason: SDUPTHER

## 2017-08-11 RX ORDER — DILTIAZEM HYDROCHLORIDE 120 MG/1
CAPSULE, EXTENDED RELEASE ORAL
COMMUNITY
Start: 2017-08-08 | End: 2017-10-19

## 2017-08-11 RX ORDER — ESCITALOPRAM OXALATE 20 MG/1
20 TABLET ORAL DAILY
Qty: 30 TAB | Refills: 4 | Status: SHIPPED | OUTPATIENT
Start: 2017-08-11 | End: 2017-10-19 | Stop reason: SDUPTHER

## 2017-08-11 NOTE — PATIENT INSTRUCTIONS
IMPRESSION:    1. Dementia  2. Behavior Problems secondary to 1    PLAN/RECOMMENDATIONS:    We will put him back the following medicine    Namenda 10mg BID ( to improve memory)  Seroquel 25mg two times per day ( if morning dose makes him too sleepy, it is fine to adjust the timing -- like 50mg before sleep) ( better sleep and better emotional control)  Lexapro 20mg QD ( to improve mood)      It will be up the family to decide how aggressive we should provide the tests    ________________________________________________________________________    Exercise muscle and brain  Weight loss  Quit alcohol altogether      Treat sleep apnea if necessary    Reduce anxiety by praying, yoga, meditation and etc  ________________________________________________________________________        ________________________________________________________________________    Fish Oil -- Omega 3 1000mg 3# daily  Try Daily Probiotic too  Vit D-3 4000 unit daily  ________________________________________________________________________          SIGNATURE:  Gerhard Cormier    CC:  Sourav Stevenson D.O.

## 2017-08-11 NOTE — MR AVS SNAPSHOT
"        Joshua MAEVE Ezequiel   2017 4:00 PM   Office Visit   MRN: [id number]    Department:  [facility]   Dept Phone:  [phone]    Description:  Male : 1933   Provider:  Gerhard Cormier M.D.           Reason for Visit     New Patient memory change      Allergies as of 2017     No Known Allergies      You were diagnosed with     Memory change   [945512]       Dementia with behavioral disturbance, unspecified dementia type   [[id number]]         Vital Signs     Blood Pressure Pulse Temperature Height Weight Body Mass Index    120/82 mmHg 72 37.1 °C (98.8 °F) 1.727 m (5' 7.99\") 86.138 kg (189 lb 14.4 oz) 28.88 kg/m2    Oxygen Saturation Smoking Status                95% Former Smoker          Basic Information     Date Of Birth Sex Race Ethnicity Preferred Language    1933 Male White Non- English      Your appointments     Oct 19, 2017  1:20 PM   New Patient with Nima Hsieh M.D.   [facility] (--)    [address]  [place] [id number] [phone]           Please bring Photo ID, Insurance Cards, All Medication Bottles and copies of any legal documents (such as Living Will, Power of ) If speaking a language besides English please bring an adult . Please arrive 30 minutes prior for check in and registration. You will be receiving a confirmation call a few days before your appointment from our automated call confirmation system.            2017  8:00 AM   Established Patient with Sourav Stevenson D.O.   [address] [id number] [facility] [address]  [place] [id number] [phone]           You will be receiving a confirmation call a few days before your appointment from our automated call confirmation system.            Nov 10, 2017  3:15 PM   FOLLOW UP with Ced Horton M.D.   [facility] (--)    [address]  [place] [id number] [phone]  "              Problem List              ICD-10-CM Priority Class Noted - Resolved    Memory change R41.3   8/4/2017 - Present    Vitamin D deficiency disease E55.9   8/4/2017 - Present    Abnormal CBC R79.89   8/4/2017 - Present    Angina at rest (CMS-HCC) I20.8   8/8/2017 - Present    Coronary artery disease involving native coronary artery of native heart without angina pectoris I25.10   8/8/2017 - Present    Pure hypercholesterolemia E78.00   8/8/2017 - Present    Dementia with behavioral disturbance F03.91   8/11/2017 - Present      Health Maintenance        Date Due Completion Dates    IMM DTaP/Tdap/Td Vaccine (1 - Tdap) 4/30/1952 ---    COLONOSCOPY 4/30/1983 ---    IMM ZOSTER VACCINE 4/30/1993 ---    IMM PNEUMOCOCCAL 65+ (ADULT) LOW/MEDIUM RISK SERIES (1 of 2 - PCV13) 4/30/1998 ---    IMM INFLUENZA (1) 9/1/2017 ---            Current Immunizations     Tuberculin Skin Test 6/19/2017  9:52 AM, 6/12/2017  9:26 AM      Below and/or attached are the medications your provider expects you to take. Review all of your home medications and newly ordered medications with your provider and/or pharmacist. Follow medication instructions as directed by your provider and/or pharmacist. Please keep your medication list with you and share with your provider. Update the information when medications are discontinued, doses are changed, or new medications (including over-the-counter products) are added; and carry medication information at all times in the event of emergency situations     Allergies:  No Known Allergies          Medications  Valid as of: August 11, 2017 -  4:45 PM    Generic Name Brand Name Tablet Size Instructions for use    Atorvastatin Calcium (Tab) LIPITOR 40 MG Take 1 Tab by mouth every day.        DilTIAZem HCl (CAPSULE SR 12 HR) CARDIZEM  MG         DilTIAZem HCl Coated Beads (CAPSULE SR 24 HR) CARDIZEM  MG Take 1 Cap by mouth every day.        Escitalopram Oxalate (Tab) LEXAPRO 20 MG Take 1 Tab  by mouth every day.        Memantine HCl (Tab) NAMENDA 10 MG Take 1 Tab by mouth 2 times a day.        QUEtiapine Fumarate (Tab) SEROQUEL 25 MG Take 1 Tab by mouth 2 times a day.        TraZODone HCl (Tab) DESYREL 50 MG Take 1 Tab by mouth at bedtime as needed for Sleep.        .                 Medicines prescribed today were sent to:     [facility] - [place], [place] - [address]    [address] [place] [address]    Phone: [phone] Fax: [phone]    Open 24 Hours?: No      Medication refill instructions:       If your prescription bottle indicates you have medication refills left, it is not necessary to call your provider’s office. Please contact your pharmacy and they will refill your medication.    If your prescription bottle indicates you do not have any refills left, you may request refills at any time through one of the following ways: The online [employer] system (except Urgent Care), by calling your provider’s office, or by asking your pharmacy to contact your provider’s office with a refill request. Medication refills are processed only during regular business hours and may not be available until the next business day. Your provider may request additional information or to have a follow-up visit with you prior to refilling your medication.   *Please Note: Medication refills are assigned a new Rx number when refilled electronically. Your pharmacy may indicate that no refills were authorized even though a new prescription for the same medication is available at the pharmacy. Please request the medicine by name with the pharmacy before contacting your provider for a refill.        Instructions        IMPRESSION:    1. Dementia  2. Behavior Problems secondary to 1    PLAN/RECOMMENDATIONS:    We will put him back the following medicine    Namenda 10mg BID ( to improve memory)  Seroquel 25mg two times per day ( if morning dose makes him too sleepy, it is fine to adjust the timing -- like 50mg before  sleep) ( better sleep and better emotional control)  Lexapro 20mg QD ( to improve mood)      It will be up the family to decide how aggressive we should provide the tests    ________________________________________________________________________    Exercise muscle and brain  Weight loss  Quit alcohol altogether      Treat sleep apnea if necessary    Reduce anxiety by praying, yoga, meditation and etc  ________________________________________________________________________        ________________________________________________________________________    Fish Oil -- Omega 3 1000mg 3# daily  Try Daily Probiotic too  Vit D-3 4000 unit daily  ________________________________________________________________________          SIGNATURE:  Gerhard Cormier    CC:  ALYSSA Davis Access Code: Activation code not generated  Current MyChart Status: Active

## 2017-08-11 NOTE — PROGRESS NOTES
NEUROLOGY NOTE    Referring Physician  Sourav Stevenson      CHIEF COMPLAINT:  Was diagnosed as dementia 5 years---   His son just moved the patient close to him for nursing care  Chief Complaint   Patient presents with   • New Patient     memory change       PRESENT ILLNESS:   Was diagnosed as dementia 5 years---   His son just moved the patient close to him for nursing care    His behavior problems also caused troubles  His sleep wake cycles are out of order    At times, his son noticed that he has inappropriate behaviour    PAST MEDICAL HISTORY:  Past Medical History   Diagnosis Date   • Memory change 8/4/2017   • Vitamin D deficiency disease 8/4/2017   • Abnormal CBC 8/4/2017       PAST SURGICAL HISTORY:  History reviewed. No pertinent past surgical history.    FAMILY HISTORY:  History reviewed. No pertinent family history.    SOCIAL HISTORY:  Social History     Social History   • Marital Status:      Spouse Name: N/A   • Number of Children: N/A   • Years of Education: N/A     Occupational History   • Not on file.     Social History Main Topics   • Smoking status: Former Smoker     Quit date: 01/01/1987   • Smokeless tobacco: Never Used   • Alcohol Use: No   • Drug Use: No   • Sexual Activity: No     Other Topics Concern   • Not on file     Social History Narrative     ALLERGIES:  No Known Allergies  TOBHX  History   Smoking status   • Former Smoker   • Quit date: 01/01/1987   Smokeless tobacco   • Never Used     ALCHX  History   Alcohol Use No     DRUGHX  History   Drug Use No           MEDICATIONS:  Current Outpatient Prescriptions   Medication   • atorvastatin (LIPITOR) 40 MG Tab   • diltiazem CD (CARDIZEM CD) 120 MG CAPSULE SR 24 HR   • trazodone (DESYREL) 50 MG Tab   • diltiazem (CARDIZEM SR) 120 MG SR capsule     No current facility-administered medications for this visit.       REVIEW OF SYSTEM:    Constitutional: Denies fevers, Denies weight changes   Eyes: Denies changes in vision, no eye pain  "  Ears/Nose/Throat/Mouth: Denies nasal congestion or sore throat   Cardiovascular: Denies chest pain or palpitations   Respiratory: Denies SOB.   Gastrointestinal/Hepatic: Denies abdominal pain, nausea, vomiting, diarrhea, constipation or GI bleeding   Genitourinary: Denies bladder dysfunction, dysuria or frequency   Musculoskeletal/Rheum: Denies joint pain and swelling   Skin/Breast: Denies rash, denies breast lumps or discharge   Neurological: dementia  Psychiatric: denies mood disorder   Endocrine: denies hx of diabetes or thyroid dysfunction   Heme/Oncology/Lymph Nodes: Denies enlarged lymph nodes, denies brusing or known bleeding disorder   Allergic/Immunologic: Denies hx of allergies         PHYSICAL AND NEUROLOGICAL EXMAINATIONS:  VITAL SIGNS: /82 mmHg  Pulse 72  Temp(Src) 37.1 °C (98.8 °F)  Ht 1.727 m (5' 7.99\")  Wt 86.138 kg (189 lb 14.4 oz)  BMI 28.88 kg/m2  SpO2 95%  CURRENT WEIGHT:   BMI: Body mass index is 28.88 kg/(m^2).  PREVIOUS WEIGHTS:  Wt Readings from Last 25 Encounters:   08/11/17 86.138 kg (189 lb 14.4 oz)   08/08/17 89.359 kg (197 lb)   08/04/17 87.544 kg (193 lb)   08/04/17 87.771 kg (193 lb 8 oz)   06/22/17 87.68 kg (193 lb 4.8 oz)       General appearance of patient: WDWN(+) NAD(+)    EYES  o Fundus : Papilledem(-) Exudates(-) Hemorrhage(-)  Nervous System  Orientation to time, place and person(+)  Memory normal(-) 0/3  Language: aphasia(-)  Knowledge: past(+) Current(+)  Attention(+)  Cranial Nerves  • Nerve 2: intact  • Nerve 3,4,6: intact  • Nerve 5 : intact  • Nerve 7: intact  • Nerve 8: hearing impaired  • Nerve 9 & 10: intact  • Nerve 11: intact  • Nerve 12: intact  Muscle Power and muscle tone: symmetric, normal in upper and lower  Sensory System: Pin sensation intact(+)  Reflexes: symmetric throughout  Cerebellar Function FNP normal   Gait : OnHeart and Vascular  Peripheral Vasucular system : Edema (-) Swelling(-)  RHB, Breathing sound clear  abdomen bowel sound " normoactive  Extremities freely moveable  Joints no contracture             IMPRESSION:    1. Dementia  2. Behavior Problems secondary to 1    PLAN/RECOMMENDATIONS:    We will put him back the following medicine    Namenda 10mg BID ( to improve memory)  Seroquel 25mg two times per day ( if morning dose makes him too sleepy, it is fine to adjust the timing -- like 50mg before sleep) ( better sleep and better emotional control)  Lexapro 20mg QD ( to improve mood)      It will be up the family to decide how aggressive we should provide the tests    ________________________________________________________________________    Exercise muscle and brain  Weight loss  Quit alcohol altogether      Treat sleep apnea if necessary    Reduce anxiety by praying, yoga, meditation and etc  ________________________________________________________________________        ________________________________________________________________________    Fish Oil -- Omega 3 1000mg 3# daily  Try Daily Probiotic too  Vit D-3 4000 unit daily  ________________________________________________________________________          SIGNATURE:  Gerhard Cormier    CC:  Sourav Stevenson D.O.

## 2017-08-12 LAB — EKG IMPRESSION: NORMAL

## 2017-09-12 ENCOUNTER — TELEPHONE (OUTPATIENT)
Dept: CARDIOLOGY | Facility: MEDICAL CENTER | Age: 82
End: 2017-09-12

## 2017-09-12 NOTE — TELEPHONE ENCOUNTER
Clearance for dental work   Received: Today   Message Contents   HEATHER Chinchilla/Santa     Please call Chelsea at Dr Benjamni North's office (dentist) at [phone] asap. Patient is scheduled for dental work tomorrow, 9/13 and they need to find out about pre-meds.      Returned call to Linda. Explained that patient is new to this office. She states she will call patient for more medical information. Chelsea informed of history of 2 stents 10/1 and 12/19/13. Pre-Meds would be up to her provider to cover, she states she will inform Dr. North of history.

## 2017-09-22 DIAGNOSIS — G47.00 INSOMNIA, UNSPECIFIED TYPE: ICD-10-CM

## 2017-09-22 RX ORDER — TRAZODONE HYDROCHLORIDE 100 MG/1
100 TABLET ORAL
Qty: 30 TAB | Refills: 3 | Status: SHIPPED | OUTPATIENT
Start: 2017-09-22 | End: 2018-01-11 | Stop reason: SDUPTHER

## 2017-10-18 ENCOUNTER — HOSPITAL ENCOUNTER (OUTPATIENT)
Dept: LAB | Facility: MEDICAL CENTER | Age: 82
End: 2017-10-18
Attending: INTERNAL MEDICINE
Payer: MEDICARE

## 2017-10-18 DIAGNOSIS — E55.9 VITAMIN D DEFICIENCY DISEASE: ICD-10-CM

## 2017-10-18 DIAGNOSIS — R79.89 ABNORMAL CBC: ICD-10-CM

## 2017-10-18 LAB
25(OH)D3 SERPL-MCNC: 22 NG/ML (ref 30–100)
FOLATE SERPL-MCNC: 15.8 NG/ML
VIT B12 SERPL-MCNC: 360 PG/ML (ref 211–911)

## 2017-10-18 PROCEDURE — 36415 COLL VENOUS BLD VENIPUNCTURE: CPT

## 2017-10-18 PROCEDURE — 82607 VITAMIN B-12: CPT

## 2017-10-18 PROCEDURE — 82746 ASSAY OF FOLIC ACID SERUM: CPT

## 2017-10-18 PROCEDURE — 82306 VITAMIN D 25 HYDROXY: CPT

## 2017-10-19 ENCOUNTER — OFFICE VISIT (OUTPATIENT)
Dept: NEUROLOGY | Facility: MEDICAL CENTER | Age: 82
End: 2017-10-19
Payer: MEDICARE

## 2017-10-19 VITALS
RESPIRATION RATE: 18 BRPM | BODY MASS INDEX: 30.71 KG/M2 | SYSTOLIC BLOOD PRESSURE: 130 MMHG | WEIGHT: 202.6 LBS | DIASTOLIC BLOOD PRESSURE: 72 MMHG | HEIGHT: 68 IN | OXYGEN SATURATION: 94 % | TEMPERATURE: 98.2 F | HEART RATE: 80 BPM

## 2017-10-19 DIAGNOSIS — F03.91 DEMENTIA WITH BEHAVIORAL DISTURBANCE, UNSPECIFIED DEMENTIA TYPE: Primary | ICD-10-CM

## 2017-10-19 PROCEDURE — 99215 OFFICE O/P EST HI 40 MIN: CPT | Performed by: PSYCHIATRY & NEUROLOGY

## 2017-10-19 RX ORDER — ESCITALOPRAM OXALATE 20 MG/1
20 TABLET ORAL DAILY
Qty: 90 TAB | Refills: 3 | Status: SHIPPED | OUTPATIENT
Start: 2017-10-19 | End: 2018-01-31 | Stop reason: SDUPTHER

## 2017-10-19 RX ORDER — QUETIAPINE FUMARATE 25 MG/1
50 TABLET, FILM COATED ORAL 2 TIMES DAILY
Qty: 120 TAB | Refills: 6 | Status: SHIPPED | OUTPATIENT
Start: 2017-10-19 | End: 2018-01-31

## 2017-10-19 RX ORDER — ESCITALOPRAM OXALATE 20 MG/1
20 TABLET ORAL DAILY
Qty: 90 TAB | Refills: 3 | Status: SHIPPED | OUTPATIENT
Start: 2017-10-19 | End: 2017-10-19 | Stop reason: SDUPTHER

## 2017-10-19 RX ORDER — QUETIAPINE FUMARATE 25 MG/1
50 TABLET, FILM COATED ORAL 2 TIMES DAILY
Qty: 120 TAB | Refills: 6 | Status: SHIPPED | OUTPATIENT
Start: 2017-10-19 | End: 2017-10-19 | Stop reason: SDUPTHER

## 2017-10-19 RX ORDER — MEMANTINE HYDROCHLORIDE 10 MG/1
10 TABLET ORAL 2 TIMES DAILY
Qty: 180 TAB | Refills: 3 | Status: SHIPPED | OUTPATIENT
Start: 2017-10-19 | End: 2018-01-31 | Stop reason: SDUPTHER

## 2017-10-19 ASSESSMENT — PAIN SCALES - GENERAL: PAINLEVEL: NO PAIN

## 2017-10-19 NOTE — PROGRESS NOTES
Subjective:      Joshua Nieves is a 84 y.o. male who presents with His son Gerardo, from the office of Dr. Stevenson, for consultation with a history of moderate to severe dementia.    HPI    The history is gotten from review of the records as well as discussions with the patient's son, the patient's dementia limiting his ability to give an accurate recounting of events and his own personal condition.    He is a pleasant 84-year-old right-handed  gentleman whose dementia evidently was diagnosed about 6 years ago. Gerardo is not sure about the exact history in terms of symptoms and progression, he had little contact with his father at the time who was living with his daughter. Patient's wife had passed away about 16 years ago, he had moved in with his daughter until she had passed away more recently and he is now living with his son.    At this time, he clearly isn't capable of caring for any of his own affairs. The family has been doing his finances, medicines, etc. for quite some time. Though he is fairly good with his ADLs, he still does require some cueing with showering. He still can eat using utensils, has not relapsed into using his hands only. His balance has become more curtailed slowly over time. His voice is still loud and clear, he does not slur, swallowing is maintained. He is not drooling. A tremor with the right hand has been seen both by the patient but also by Gerardo on occasion, seeming an action based process only.    The real issues are around his behavior. He does become very suspicious and almost paranoid, he can act out and become very angry very easily. He does not sleep well, there are significant nightmares and wandering. He will often speak almost out of context and that does seem to be some language difficulties with word hesitancy. He often mistakes family members as being alive when in fact they have passed away years before, including both his wife and daughter. Short-term memory  "is so severely curtailed that Gerardo has to simply distract the patient from conversation when he continues to ask again and again about one specific topic.    He did see Dr. Cormier 2 months ago on an emergent basis to renew medications. At that point he was simply renewed on his Namenda, Celexa and Seroquel. No other changes have been instituted. No additional diagnostics were recommended at that time.    Medical history is remarkable for CAD, dyslipidemia and hypertension as well as his dementia, there is no surgical history of note. Known in the family has a history of dementia, both his parents are passed, his other 2 children are also alive and well. He does not smoke or drink. 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 He presently lives with his son and goes to daytime cares Monday through Friday, all day, and is enjoying himself immensely.    He is on Celexa 20 mg daily, Seroquel 50 mg, twice a day, Namenda 10 mg, twice a day, Cardizem, trazodone 100 mg, daily at bedtime, and Lipitor 40 mg daily at bedtime.    Review of Systems   Unable to perform ROS: Dementia        Objective:     /72   Pulse 80   Temp 36.8 °C (98.2 °F)   Resp 18   Ht 1.727 m (5' 8\")   Wt 91.9 kg (202 lb 9.6 oz)   SpO2 94%   BMI 30.81 kg/m²      Physical Exam    He appears in no acute distress. Vital signs are stable. The depression rating scale is too unreliable though its value today is 9. The patient states he may feel depressed but then later on in the interim if she states he feels fine. There was no malar rash, jaw or temporal tenderness, or jaw claudication. Supple, range of motion is full. Cardiac evaluation is unremarkable.    The patient is at times irritable, at other times almost emotionally sad, looking as if he would cry easily. Still, his mood is euthymic, it is his affect that fluctuates. He is clean and appropriately dressed. He is cooperative. He names and repeats, " there are some word finding difficulties, rare paraphasic errors, but he repeats and there is no agnosia or apraxia. He states that his wife, both his parents and his daughter are all still alive. He still believes he is living in his old house in [place] and that his son lives next to him. He is quite redundant, repeats himself frequently, asks the same question again and again. He can easily become quite suspicious and argumentative if confronted. Though he states he cares for his own medicines and finances, he then has difficulty recounting if he has any medical problems at all, he certainly denies that he has memory loss, he certainly does not recall the medicines that he does take. Frontal release signs are absent. Rostrocaudal extinction does exist.    PERRLA/EOMI, there is no hypophonia or tachyphemia, eye blink frequency is maintained. Visual fields are full, facial movements are symmetric, there is no sensory loss across the midline. The tongue and uvula are midline, jaw jerk is absent, shoulder shrug is symmetric. There is a tremulousness with the right hand that is seen when held against gravity, attenuating at rest. There is no rigidity even with distraction in the upper extremities. There is no bradykinesia. Strength is intact throughout. Reflexes are present though they're diminished at the ankles, there are no asymmetries, both toes are downgoing. He stands slowly, needs the use of a walker. There is no appendicular dystaxia. Repetitive movements in the arms and hands show good amplitude though they are slowed. Foot tapping shows good amplitude though it is slowed. There is no sensory loss to temperature or vibration.     Assessment/Plan:     1. Dementia with behavioral disturbance, unspecified dementia type  I suspect this is Alzheimer's dementia, though some of the behavioral problems, paranoid symptomatology, the tremor, balance difficulties, etc., could also be explained on the basis of Lewy  Body Disease, NPH, etc. Still, the dementia is quite severe, so I suspect all of these symptoms are simply the result of its severity, not a misdiagnosis. In any case, it would be nice to know if he has been on any of the cholinesterase inhibitors since this could be added to Namenda with good benefit. I don't think that further diagnostics are indicated at this time.    His son is beginning to learn how to work all of this since his father has moved in with him only recently. His 2 sons have had to move out the keep themselves away from their grand-father who can become quite agitated on a random basis. The patient himself is no longer competent, but there are selby of  for healthcare and finance in the record, also a living will and advanced directives. Gerardo is looking for eventual placement into a care facility early next year.     Because of the behavioral problems, Seroquel will be adjusted upwards and more aggressively, for now 50 mg, twice a day, though I suspect we may need to go higher. This certainly can help with some of the sedation that is required at nighttime though he is already on trazodone. Celexa and Namenda will be continued unchanged. The rationale for this was reviewed in full, face-to-face time was spent at length with his son. I will follow up with both of them in about 6 months otherwise, phone contact in the interim as we adjust his medicines.    - memantine (NAMENDA) 10 MG Tab; Take 1 Tab by mouth 2 times a day.  Dispense: 180 Tab; Refill: 3  - quetiapine (SEROQUEL) 25 MG Tab; Take 2 Tabs by mouth 2 times a day.  Dispense: 120 Tab; Refill: 6  - escitalopram (LEXAPRO) 20 MG tablet; Take 1 Tab by mouth every day.  Dispense: 90 Tab; Refill: 3    Time: Evaluation of 40 minutes for exam, review, discussion, and education  Discussion: As mentioned in the assessment, over 60% of the time spent face-to-face counseling and coordinating care with the patient's son.

## 2017-11-03 ENCOUNTER — TELEPHONE (OUTPATIENT)
Dept: NEUROLOGY | Facility: MEDICAL CENTER | Age: 82
End: 2017-11-03

## 2017-11-03 ENCOUNTER — OFFICE VISIT (OUTPATIENT)
Dept: MEDICAL GROUP | Facility: CLINIC | Age: 82
End: 2017-11-03
Payer: MEDICARE

## 2017-11-03 VITALS
HEART RATE: 70 BPM | DIASTOLIC BLOOD PRESSURE: 95 MMHG | TEMPERATURE: 97 F | OXYGEN SATURATION: 95 % | RESPIRATION RATE: 20 BRPM | WEIGHT: 202.5 LBS | BODY MASS INDEX: 30.69 KG/M2 | HEIGHT: 68 IN | SYSTOLIC BLOOD PRESSURE: 135 MMHG

## 2017-11-03 DIAGNOSIS — Z23 NEED FOR INFLUENZA VACCINATION: ICD-10-CM

## 2017-11-03 DIAGNOSIS — F03.91 DEMENTIA WITH BEHAVIORAL DISTURBANCE, UNSPECIFIED DEMENTIA TYPE: ICD-10-CM

## 2017-11-03 DIAGNOSIS — Z23 NEED FOR VACCINATION WITH 13-POLYVALENT PNEUMOCOCCAL CONJUGATE VACCINE: ICD-10-CM

## 2017-11-03 PROCEDURE — 90662 IIV NO PRSV INCREASED AG IM: CPT | Performed by: INTERNAL MEDICINE

## 2017-11-03 PROCEDURE — 90670 PCV13 VACCINE IM: CPT | Performed by: INTERNAL MEDICINE

## 2017-11-03 PROCEDURE — G0009 ADMIN PNEUMOCOCCAL VACCINE: HCPCS | Performed by: INTERNAL MEDICINE

## 2017-11-03 PROCEDURE — G0008 ADMIN INFLUENZA VIRUS VAC: HCPCS | Performed by: INTERNAL MEDICINE

## 2017-11-03 PROCEDURE — 99214 OFFICE O/P EST MOD 30 MIN: CPT | Mod: 25 | Performed by: INTERNAL MEDICINE

## 2017-11-03 RX ORDER — QUETIAPINE FUMARATE 50 MG/1
50 TABLET, FILM COATED ORAL 2 TIMES DAILY
COMMUNITY
End: 2018-01-31 | Stop reason: SDUPTHER

## 2017-11-03 NOTE — LETTER
November 3, 2017        Patient: Joshua Nieves   YOB: 1933   Date of Visit: 11/3/2017           To Whom It May Concern:    It is my medical opinion that Joshua EscalerasIs not mentally competent to handle his own financial affairs by reason of mental illness.    If you have any questions or concerns, please don't hesitate to call.        Sincerely,          Sourav Stevenson D.O.  Board-certified general internal medicine     [address] [id number] [phone] (Phone)  [phone] (Fax)

## 2017-11-03 NOTE — PROGRESS NOTES
CC: Joshua Nieves is a 84 y.o. male is suffering from   Chief Complaint   Patient presents with   • Follow-Up         SUBJECTIVE:  1. Dementia with behavioral disturbance, unspecified dementia type  Joshua is accompanied by Gerardo his son, I have had a private conversation with Gerardo, I have reviewed his observations of Joshua from June 2017 to the present. I agree with his observations regarding dementia. Patient is not oriented to place or time. I do not believe he is competent to manage his own financial affairs.    2. Need for influenza vaccination  Patient's need of an influenza vaccination which will be given today    3. Need for vaccination with 13-polyvalent pneumococcal conjugate vaccine  Patient is in need of a pneumococcal vaccination which will also be administered        Past social, family, history:   Social History   Substance Use Topics   • Smoking status: Former Smoker     Quit date: 1/1/1987   • Smokeless tobacco: Never Used   • Alcohol use No         MEDICATIONS:    Current Outpatient Prescriptions:   •  quetiapine (SEROQUEL) 50 MG tablet, Take 50 mg by mouth 2 times a day., Disp: , Rfl:   •  memantine (NAMENDA) 10 MG Tab, Take 1 Tab by mouth 2 times a day., Disp: 180 Tab, Rfl: 3  •  escitalopram (LEXAPRO) 20 MG tablet, Take 1 Tab by mouth every day., Disp: 90 Tab, Rfl: 3  •  trazodone (DESYREL) 100 MG Tab, Take 1 Tab by mouth every bedtime., Disp: 30 Tab, Rfl: 3  •  atorvastatin (LIPITOR) 40 MG Tab, Take 1 Tab by mouth every day., Disp: 90 Tab, Rfl: 3  •  diltiazem CD (CARDIZEM CD) 120 MG CAPSULE SR 24 HR, Take 1 Cap by mouth every day., Disp: 30 Cap, Rfl: 11  •  quetiapine (SEROQUEL) 25 MG Tab, Take 2 Tabs by mouth 2 times a day., Disp: 120 Tab, Rfl: 6    PROBLEMS:  Patient Active Problem List    Diagnosis Date Noted   • Dementia with behavioral disturbance 08/11/2017   • Angina at rest (CMS-HCC) 08/08/2017   • Coronary artery disease involving native coronary artery of native heart  "without angina pectoris 08/08/2017   • Pure hypercholesterolemia 08/08/2017   • Vitamin D deficiency disease 08/04/2017   • Abnormal CBC 08/04/2017       REVIEW OF SYSTEMS:  Gen.:  No Nausea, Vomiting, fever, Chills.  Heart: No chest pain.  Lungs:  No shortness of Breath.  Psychological: Anjel unusual Anxiety depression     PHYSICAL EXAM   Constitutional: Alert, cooperative, not in acute distress.  Cardiovascular:  Rate Rhythm is regular without murmurs rubs clicks.     Thorax & Lungs: Clear to auscultation, no wheezing, rhonchi, or rales  HENT: Normocephalic, Atraumatic.  Eyes: PERRLA, EOMI, Conjunctiva normal.   Neck: Trachia is midline no swelling of the thyroid.   Lymphatic: No lymphadenopathy noted.   Abdomin: Soft non-tender, no rebound, no guarding.   Neurologic: Patient is Alert but is not oriented to time or place, cranial nerves II through XII are intact. Patient's findings are consistent with at least moderate to severe dementia.  Psychiatric: Affect normal, Judgment normal, Mood normal.     VITAL SIGNS:/95   Pulse 70   Temp 36.1 °C (97 °F)   Resp 20   Ht 1.727 m (5' 8\")   Wt 91.9 kg (202 lb 8 oz)   SpO2 95%   BMI 30.79 kg/m²     Labs: Reviewed    Assessment:                                                     Plan:    1. Dementia with behavioral disturbance, unspecified dementia type  Dementia continue Seroquel, consultation from Dr. Coelho reviewed separate letter for Ced to take 2 via staplers will be dictated patient in my opinion is not competent to handle his financial affairs  - quetiapine (SEROQUEL) 50 MG tablet; Take 50 mg by mouth 2 times a day.    2. Need for influenza vaccination  Vaccination given  - INFLUENZA VACCINE, HIGH DOSE (65+ ONLY)    3. Need for vaccination with 13-polyvalent pneumococcal conjugate vaccine  Vaccination given  - Pneumococcal Conjugate Vaccine 13-Valent <4yo IM        "

## 2017-11-03 NOTE — LETTER
2017        Joshua Nieves  : 1933    To Whom It May Concern:    Mr. Nieves suffers from moderate to severe dementia, a progressive disorder. He is no longer competent to be involved in any type of personnel, health, or financial decisions. This condition is permanent.    Sincerely:        MACARENA Courtney.

## 2017-11-03 NOTE — TELEPHONE ENCOUNTER
Patient's son stopped by the office for Dr. Hsieh needing a letter for his . The letter needs to state that after his evaluation with you on 10/19/17 due to his diagnosis of severe dementia, the patient is no longer able to make financial decisions for himself. Dr. Setvenson has written a letter for him as well but he does need two letters; one from his PCP and one from his neurologist. Gerardo, the son, is in the process of getting him set up with [facility] but they are trying to get all of his finances in order.

## 2017-11-10 ENCOUNTER — OFFICE VISIT (OUTPATIENT)
Dept: CARDIOLOGY | Facility: MEDICAL CENTER | Age: 82
End: 2017-11-10
Payer: MEDICARE

## 2017-11-10 VITALS
OXYGEN SATURATION: 93 % | HEIGHT: 68 IN | HEART RATE: 74 BPM | BODY MASS INDEX: 30.77 KG/M2 | WEIGHT: 203 LBS | DIASTOLIC BLOOD PRESSURE: 80 MMHG | SYSTOLIC BLOOD PRESSURE: 132 MMHG

## 2017-11-10 DIAGNOSIS — E78.00 PURE HYPERCHOLESTEROLEMIA: ICD-10-CM

## 2017-11-10 DIAGNOSIS — R79.89 ABNORMAL CBC: ICD-10-CM

## 2017-11-10 DIAGNOSIS — I25.10 CORONARY ARTERY DISEASE INVOLVING NATIVE CORONARY ARTERY OF NATIVE HEART WITHOUT ANGINA PECTORIS: ICD-10-CM

## 2017-11-10 DIAGNOSIS — I20.89 ANGINA AT REST: ICD-10-CM

## 2017-11-10 PROCEDURE — 99215 OFFICE O/P EST HI 40 MIN: CPT | Performed by: INTERNAL MEDICINE

## 2017-11-10 RX ORDER — SPIRONOLACTONE 25 MG/1
12.5 TABLET ORAL DAILY
Qty: 30 TAB | Refills: 11 | Status: SHIPPED | OUTPATIENT
Start: 2017-11-10 | End: 2018-01-30 | Stop reason: SINTOL

## 2017-11-10 RX ORDER — RANOLAZINE 500 MG/1
500 TABLET, EXTENDED RELEASE ORAL 2 TIMES DAILY
Qty: 60 TAB | Refills: 11 | Status: SHIPPED | OUTPATIENT
Start: 2017-11-10 | End: 2018-11-24 | Stop reason: SDUPTHER

## 2017-11-10 RX ORDER — DILTIAZEM HYDROCHLORIDE 120 MG/1
CAPSULE, EXTENDED RELEASE ORAL
COMMUNITY
Start: 2017-11-06 | End: 2017-11-10

## 2017-11-10 ASSESSMENT — ENCOUNTER SYMPTOMS
CLAUDICATION: 0
HEMOPTYSIS: 0
BRUISES/BLEEDS EASILY: 0
ORTHOPNEA: 0
SORE THROAT: 0
NEUROLOGICAL NEGATIVE: 1
FEVER: 0
COUGH: 0
GASTROINTESTINAL NEGATIVE: 1
STRIDOR: 0
LOSS OF CONSCIOUSNESS: 0
CHILLS: 0
SHORTNESS OF BREATH: 0
CARDIOVASCULAR NEGATIVE: 1
PND: 0
PALPITATIONS: 0
EYES NEGATIVE: 1
MUSCULOSKELETAL NEGATIVE: 1
WEAKNESS: 0
SPUTUM PRODUCTION: 0
WHEEZING: 0
DIZZINESS: 0
CONSTITUTIONAL NEGATIVE: 1
RESPIRATORY NEGATIVE: 1

## 2017-11-10 NOTE — LETTER
[facility]   [address]  [place] [address]  Phone: [phone]  Fax: [phone]              Joshua Nieves  4/30/1933    Encounter Date: 11/10/2017    Ced Horton M.D.          PROGRESS NOTE:  Subjective:   Joshua Nieves is a 84 y.o. male who presents today As a follow-up for his CAD status post stent and angina. He continues to have memory problems and his son is going to place him into a long-term care facility in January. He continues complaining of having a nondescriptive chest pain between his nipples but can't say how long last but thinks is maybe 20-30 minutes of time. He can provide a description of the intensity either. He does have some trace lower extremity on exam. He is only taking diltiazem which was started for the concern of fatigue which metoprolol be brought on also as an antianginal. Otherwise no changes to his history.    Past Medical History:   Diagnosis Date   • Abnormal CBC 8/4/2017   • Dementia with behavioral disturbance 8/11/2017   • Vitamin D deficiency disease 8/4/2017     History reviewed. No pertinent surgical history.  History reviewed. No pertinent family history.  History   Smoking Status   • Former Smoker   • Quit date: 1/1/1987   Smokeless Tobacco   • Never Used     No Known Allergies  Outpatient Encounter Prescriptions as of 11/10/2017   Medication Sig Dispense Refill   • spironolactone (ALDACTONE) 25 MG Tab Take 0.5 Tabs by mouth every day. 30 Tab 11   • ranolazine (RANEXA) 500 MG TABLET SR 12 HR Take 1 Tab by mouth 2 times a day. 60 Tab 11   • memantine (NAMENDA) 10 MG Tab Take 1 Tab by mouth 2 times a day. 180 Tab 3   • quetiapine (SEROQUEL) 25 MG Tab Take 2 Tabs by mouth 2 times a day. 120 Tab 6   • escitalopram (LEXAPRO) 20 MG tablet Take 1 Tab by mouth every day. 90 Tab 3   • trazodone (DESYREL) 100 MG Tab Take 1 Tab by mouth every bedtime. 30 Tab 3   • atorvastatin (LIPITOR) 40 MG Tab Take 1 Tab  "by mouth every day. 90 Tab 3   • diltiazem CD (CARDIZEM CD) 120 MG CAPSULE SR 24 HR Take 1 Cap by mouth every day. 30 Cap 11   • [DISCONTINUED] diltiazem (CARDIZEM SR) 120 MG SR capsule      • quetiapine (SEROQUEL) 50 MG tablet Take 50 mg by mouth 2 times a day.       No facility-administered encounter medications on file as of 11/10/2017.      Review of Systems   Constitutional: Negative.  Negative for chills, fever and malaise/fatigue.   HENT: Negative.  Negative for sore throat.    Eyes: Negative.    Respiratory: Negative.  Negative for cough, hemoptysis, sputum production, shortness of breath, wheezing and stridor.    Cardiovascular: Negative.  Negative for chest pain, palpitations, orthopnea, claudication, leg swelling and PND.   Gastrointestinal: Negative.    Genitourinary: Negative.    Musculoskeletal: Negative.    Skin: Negative.    Neurological: Negative.  Negative for dizziness, loss of consciousness and weakness.   Endo/Heme/Allergies: Negative.  Does not bruise/bleed easily.   All other systems reviewed and are negative.       Objective:   /80   Pulse 74   Ht 1.727 m (5' 8\")   Wt 92.1 kg (203 lb)   SpO2 93%   BMI 30.87 kg/m²      Physical Exam   Constitutional: He is oriented to person, place, and time. He appears well-developed and well-nourished. No distress.   HENT:   Head: Normocephalic.   Mouth/Throat: Oropharynx is clear and moist.   Eyes: EOM are normal. Pupils are equal, round, and reactive to light. Right eye exhibits no discharge. Left eye exhibits no discharge. No scleral icterus.   Neck: Normal range of motion. Neck supple. No JVD present. No tracheal deviation present.   Cardiovascular: Normal rate, regular rhythm, S1 normal, S2 normal, normal heart sounds, intact distal pulses and normal pulses.  Exam reveals no gallop, no S3, no S4 and no friction rub.    No murmur heard.   No systolic murmur is present    No diastolic murmur is present   Pulses:       Carotid pulses are 2+ on " the right side, and 2+ on the left side.       Radial pulses are 2+ on the right side, and 2+ on the left side.        Dorsalis pedis pulses are 2+ on the right side, and 2+ on the left side.        Posterior tibial pulses are 2+ on the right side, and 2+ on the left side.   Pulmonary/Chest: Effort normal and breath sounds normal. No respiratory distress. He has no wheezes. He has no rales.   Abdominal: Soft. Bowel sounds are normal. He exhibits no distension and no mass. There is no tenderness. There is no rebound and no guarding.   Musculoskeletal: He exhibits no edema.   Neurological: He is alert and oriented to person, place, and time. No cranial nerve deficit.   Skin: Skin is warm and dry. He is not diaphoretic. No pallor.   Psychiatric: He has a normal mood and affect. His behavior is normal. Judgment and thought content normal.   Nursing note and vitals reviewed.      Assessment:     1. Abnormal CBC  ranolazine (RANEXA) 500 MG TABLET SR 12 HR   2. Angina at rest (CMS-HCC)  spironolactone (ALDACTONE) 25 MG Tab    ranolazine (RANEXA) 500 MG TABLET SR 12 HR   3. Coronary artery disease involving native coronary artery of native heart without angina pectoris  spironolactone (ALDACTONE) 25 MG Tab    ranolazine (RANEXA) 500 MG TABLET SR 12 HR   4. Pure hypercholesterolemia  spironolactone (ALDACTONE) 25 MG Tab    ranolazine (RANEXA) 500 MG TABLET SR 12 HR       Medical Decision Making:  Today's Assessment / Status / Plan:     84-year-old male with CAD and moderate dementia. For his anginal equivalent I will go ahead and add on Ranexa. I will also start a very low dose of spironolactone. We will see him back in January prior to his placement.    1. CAD s/p stent    - start asa 81    - start atorva 40    - start dilt      2. HLD    - per above     3. Alzhiemers    - defer    4. Angina    - start ranexa 500 BID    - start farideh 12.5     Thank for you allowing me to take part in your patient's care, please call  should you have any questions or would like to discuss this patient.      Sourav Stevenson D.O.  [address]  [address]  [place] [address]  VIA In Basket

## 2017-11-10 NOTE — PROGRESS NOTES
Subjective:   Joshua Nieves is a 84 y.o. male who presents today As a follow-up for his CAD status post stent and angina. He continues to have memory problems and his son is going to place him into a long-term care facility in January. He continues complaining of having a nondescriptive chest pain between his nipples but can't say how long last but thinks is maybe 20-30 minutes of time. He can provide a description of the intensity either. He does have some trace lower extremity on exam. He is only taking diltiazem which was started for the concern of fatigue which metoprolol be brought on also as an antianginal. Otherwise no changes to his history.    Past Medical History:   Diagnosis Date   • Abnormal CBC 8/4/2017   • Dementia with behavioral disturbance 8/11/2017   • Vitamin D deficiency disease 8/4/2017     History reviewed. No pertinent surgical history.  History reviewed. No pertinent family history.  History   Smoking Status   • Former Smoker   • Quit date: 1/1/1987   Smokeless Tobacco   • Never Used     No Known Allergies  Outpatient Encounter Prescriptions as of 11/10/2017   Medication Sig Dispense Refill   • spironolactone (ALDACTONE) 25 MG Tab Take 0.5 Tabs by mouth every day. 30 Tab 11   • ranolazine (RANEXA) 500 MG TABLET SR 12 HR Take 1 Tab by mouth 2 times a day. 60 Tab 11   • memantine (NAMENDA) 10 MG Tab Take 1 Tab by mouth 2 times a day. 180 Tab 3   • quetiapine (SEROQUEL) 25 MG Tab Take 2 Tabs by mouth 2 times a day. 120 Tab 6   • escitalopram (LEXAPRO) 20 MG tablet Take 1 Tab by mouth every day. 90 Tab 3   • trazodone (DESYREL) 100 MG Tab Take 1 Tab by mouth every bedtime. 30 Tab 3   • atorvastatin (LIPITOR) 40 MG Tab Take 1 Tab by mouth every day. 90 Tab 3   • diltiazem CD (CARDIZEM CD) 120 MG CAPSULE SR 24 HR Take 1 Cap by mouth every day. 30 Cap 11   • [DISCONTINUED] diltiazem (CARDIZEM SR) 120 MG SR capsule      • quetiapine (SEROQUEL) 50 MG tablet Take 50 mg by mouth 2 times a day.    "    No facility-administered encounter medications on file as of 11/10/2017.      Review of Systems   Constitutional: Negative.  Negative for chills, fever and malaise/fatigue.   HENT: Negative.  Negative for sore throat.    Eyes: Negative.    Respiratory: Negative.  Negative for cough, hemoptysis, sputum production, shortness of breath, wheezing and stridor.    Cardiovascular: Negative.  Negative for chest pain, palpitations, orthopnea, claudication, leg swelling and PND.   Gastrointestinal: Negative.    Genitourinary: Negative.    Musculoskeletal: Negative.    Skin: Negative.    Neurological: Negative.  Negative for dizziness, loss of consciousness and weakness.   Endo/Heme/Allergies: Negative.  Does not bruise/bleed easily.   All other systems reviewed and are negative.       Objective:   /80   Pulse 74   Ht 1.727 m (5' 8\")   Wt 92.1 kg (203 lb)   SpO2 93%   BMI 30.87 kg/m²     Physical Exam   Constitutional: He is oriented to person, place, and time. He appears well-developed and well-nourished. No distress.   HENT:   Head: Normocephalic.   Mouth/Throat: Oropharynx is clear and moist.   Eyes: EOM are normal. Pupils are equal, round, and reactive to light. Right eye exhibits no discharge. Left eye exhibits no discharge. No scleral icterus.   Neck: Normal range of motion. Neck supple. No JVD present. No tracheal deviation present.   Cardiovascular: Normal rate, regular rhythm, S1 normal, S2 normal, normal heart sounds, intact distal pulses and normal pulses.  Exam reveals no gallop, no S3, no S4 and no friction rub.    No murmur heard.   No systolic murmur is present    No diastolic murmur is present   Pulses:       Carotid pulses are 2+ on the right side, and 2+ on the left side.       Radial pulses are 2+ on the right side, and 2+ on the left side.        Dorsalis pedis pulses are 2+ on the right side, and 2+ on the left side.        Posterior tibial pulses are 2+ on the right side, and 2+ on the left " side.   Pulmonary/Chest: Effort normal and breath sounds normal. No respiratory distress. He has no wheezes. He has no rales.   Abdominal: Soft. Bowel sounds are normal. He exhibits no distension and no mass. There is no tenderness. There is no rebound and no guarding.   Musculoskeletal: He exhibits no edema.   Neurological: He is alert and oriented to person, place, and time. No cranial nerve deficit.   Skin: Skin is warm and dry. He is not diaphoretic. No pallor.   Psychiatric: He has a normal mood and affect. His behavior is normal. Judgment and thought content normal.   Nursing note and vitals reviewed.      Assessment:     1. Abnormal CBC  ranolazine (RANEXA) 500 MG TABLET SR 12 HR   2. Angina at rest (CMS-HCC)  spironolactone (ALDACTONE) 25 MG Tab    ranolazine (RANEXA) 500 MG TABLET SR 12 HR   3. Coronary artery disease involving native coronary artery of native heart without angina pectoris  spironolactone (ALDACTONE) 25 MG Tab    ranolazine (RANEXA) 500 MG TABLET SR 12 HR   4. Pure hypercholesterolemia  spironolactone (ALDACTONE) 25 MG Tab    ranolazine (RANEXA) 500 MG TABLET SR 12 HR       Medical Decision Making:  Today's Assessment / Status / Plan:     84-year-old male with CAD and moderate dementia. For his anginal equivalent I will go ahead and add on Ranexa. I will also start a very low dose of spironolactone. We will see him back in January prior to his placement.    1. CAD s/p stent    - start asa 81    - start atorva 40    - start dilt      2. HLD    - per above     3. Alzhiemers    - defer    4. Angina    - start ranexa 500 BID    - start farideh 12.5     Thank for you allowing me to take part in your patient's care, please call should you have any questions or would like to discuss this patient.

## 2018-01-11 DIAGNOSIS — G47.00 INSOMNIA, UNSPECIFIED TYPE: ICD-10-CM

## 2018-01-11 RX ORDER — TRAZODONE HYDROCHLORIDE 100 MG/1
TABLET ORAL
Qty: 30 TAB | Refills: 11 | Status: SHIPPED | OUTPATIENT
Start: 2018-01-11 | End: 2018-12-20 | Stop reason: SDUPTHER

## 2018-01-22 ENCOUNTER — OFFICE VISIT (OUTPATIENT)
Dept: MEDICAL GROUP | Facility: CLINIC | Age: 83
End: 2018-01-22
Payer: MEDICARE

## 2018-01-22 VITALS
RESPIRATION RATE: 16 BRPM | DIASTOLIC BLOOD PRESSURE: 72 MMHG | TEMPERATURE: 97.3 F | HEIGHT: 68 IN | BODY MASS INDEX: 28.89 KG/M2 | OXYGEN SATURATION: 96 % | WEIGHT: 190.6 LBS | HEART RATE: 72 BPM | SYSTOLIC BLOOD PRESSURE: 128 MMHG

## 2018-01-22 DIAGNOSIS — I25.10 CORONARY ARTERY DISEASE INVOLVING NATIVE CORONARY ARTERY OF NATIVE HEART WITHOUT ANGINA PECTORIS: ICD-10-CM

## 2018-01-22 DIAGNOSIS — F03.91 DEMENTIA WITH BEHAVIORAL DISTURBANCE, UNSPECIFIED DEMENTIA TYPE: ICD-10-CM

## 2018-01-22 PROCEDURE — 99213 OFFICE O/P EST LOW 20 MIN: CPT | Performed by: INTERNAL MEDICINE

## 2018-01-22 NOTE — PROGRESS NOTES
CC: Joshua Nieves is a 84 y.o. male is suffering from No chief complaint on file.        SUBJECTIVE:  1. Dementia with behavioral disturbance, unspecified dementia type  Joshua is here for follow-up is accompanied by Gerardo his son.  Patient suffers from dementia, was in eighth , remembers distant events well but nothing currently.     2. Coronary artery disease involving native coronary artery of native heart without angina pectoris  Patient with a history of coronary artery disease is followed by cardiology.        Past social, family, history:   Social History   Substance Use Topics   • Smoking status: Former Smoker     Quit date: 1/1/1987   • Smokeless tobacco: Never Used   • Alcohol use No         MEDICATIONS:    Current Outpatient Prescriptions:   •  trazodone (DESYREL) 100 MG Tab, TAKE 1 TABLET BY MOUTH AT BEDTIME, Disp: 30 Tab, Rfl: 11  •  ranolazine (RANEXA) 500 MG TABLET SR 12 HR, Take 1 Tab by mouth 2 times a day., Disp: 60 Tab, Rfl: 11  •  memantine (NAMENDA) 10 MG Tab, Take 1 Tab by mouth 2 times a day., Disp: 180 Tab, Rfl: 3  •  quetiapine (SEROQUEL) 25 MG Tab, Take 2 Tabs by mouth 2 times a day., Disp: 120 Tab, Rfl: 6  •  escitalopram (LEXAPRO) 20 MG tablet, Take 1 Tab by mouth every day., Disp: 90 Tab, Rfl: 3  •  atorvastatin (LIPITOR) 40 MG Tab, Take 1 Tab by mouth every day., Disp: 90 Tab, Rfl: 3  •  diltiazem CD (CARDIZEM CD) 120 MG CAPSULE SR 24 HR, Take 1 Cap by mouth every day., Disp: 30 Cap, Rfl: 11  •  spironolactone (ALDACTONE) 25 MG Tab, Take 0.5 Tabs by mouth every day., Disp: 30 Tab, Rfl: 11  •  quetiapine (SEROQUEL) 50 MG tablet, Take 50 mg by mouth 2 times a day., Disp: , Rfl:     PROBLEMS:  Patient Active Problem List    Diagnosis Date Noted   • Dementia with behavioral disturbance 08/11/2017   • Angina at rest (CMS-HCC) 08/08/2017   • Coronary artery disease involving native coronary artery of native heart without angina pectoris 08/08/2017   • Pure  "hypercholesterolemia 08/08/2017   • Vitamin D deficiency disease 08/04/2017   • Abnormal CBC 08/04/2017       REVIEW OF SYSTEMS:  Gen.:  No Nausea, Vomiting, fever, Chills.  Heart: No chest pain.  Lungs:  No shortness of Breath.  Psychological: Anjel unusual Anxiety depression     PHYSICAL EXAM   Constitutional: Alert, cooperative, not in acute distress.  Cardiovascular:  Rate Rhythm is regular without murmurs rubs clicks.     Thorax & Lungs: Clear to auscultation, no wheezing, rhonchi, or rales  HENT: Normocephalic, Atraumatic.  Eyes: PERRLA, EOMI, Conjunctiva normal.   Neck: Trachia is midline no swelling of the thyroid.   Neurologic: Alert & oriented x 3, cranial nerves II through XII are intact, Normal motor function, Normal sensory function, No focal deficits noted.   Psychiatric: Affect normal, Judgment normal, Mood normal.     VITAL SIGNS:/72   Pulse 72   Temp 36.3 °C (97.3 °F)   Resp 16   Ht 1.727 m (5' 8\")   Wt 86.5 kg (190 lb 9.6 oz)   SpO2 96%   BMI 28.98 kg/m²     Labs: Reviewed    Assessment:                                                     Plan:    1. Dementia with behavioral disturbance, unspecified dementia type  Paperwork was completed today for [facility].    2. Coronary artery disease involving native coronary artery of native heart without angina pectoris  Clinically stable follow up with cardiology when necessary        "

## 2018-01-24 ENCOUNTER — TELEPHONE (OUTPATIENT)
Dept: MEDICAL GROUP | Facility: CLINIC | Age: 83
End: 2018-01-24

## 2018-01-24 NOTE — TELEPHONE ENCOUNTER
1. Caller Name: Edelmira from [place]                                          Call Back Number: [phone]      Patient approves a detailed voicemail message: N\A    Called to get clarification on recently submitted paperwork. The box was checked for secured living, with this box checked the patient would be required to live in a secure dementia living facility and unable to leave without a family member or caretaker. The son was under the impression that his father would be moving into an assisted living facility where he would just have help with nutrition and medication. Is the patient a danger to himself with tendency to wander or have aggressive behaviors? Edelmira is sending a new form to complete.

## 2018-01-25 NOTE — TELEPHONE ENCOUNTER
"Telephone call returned to Edelmira informed her I left a message with Ced so I could discuss his father's condition so that we are on the \"same page\". They need response for February 1 which is his anticipated move-in date.  "

## 2018-01-30 ENCOUNTER — OFFICE VISIT (OUTPATIENT)
Dept: CARDIOLOGY | Facility: MEDICAL CENTER | Age: 83
End: 2018-01-30
Payer: MEDICARE

## 2018-01-30 VITALS
WEIGHT: 193 LBS | HEART RATE: 70 BPM | BODY MASS INDEX: 29.25 KG/M2 | SYSTOLIC BLOOD PRESSURE: 136 MMHG | OXYGEN SATURATION: 96 % | HEIGHT: 68 IN | DIASTOLIC BLOOD PRESSURE: 70 MMHG

## 2018-01-30 DIAGNOSIS — I20.89 ANGINA AT REST: ICD-10-CM

## 2018-01-30 DIAGNOSIS — E78.00 PURE HYPERCHOLESTEROLEMIA: ICD-10-CM

## 2018-01-30 DIAGNOSIS — I25.10 CORONARY ARTERY DISEASE INVOLVING NATIVE CORONARY ARTERY OF NATIVE HEART WITHOUT ANGINA PECTORIS: ICD-10-CM

## 2018-01-30 PROCEDURE — 99214 OFFICE O/P EST MOD 30 MIN: CPT | Performed by: INTERNAL MEDICINE

## 2018-01-30 ASSESSMENT — ENCOUNTER SYMPTOMS
CLAUDICATION: 0
PALPITATIONS: 0
CHILLS: 0
FEVER: 0
STRIDOR: 0
LOSS OF CONSCIOUSNESS: 0
ORTHOPNEA: 0
EYES NEGATIVE: 1
SHORTNESS OF BREATH: 0
WEAKNESS: 0
HEMOPTYSIS: 0
WHEEZING: 0
CARDIOVASCULAR NEGATIVE: 1
SORE THROAT: 0
SPUTUM PRODUCTION: 0
MUSCULOSKELETAL NEGATIVE: 1
NEUROLOGICAL NEGATIVE: 1
PND: 0
COUGH: 0
DIZZINESS: 0
RESPIRATORY NEGATIVE: 1
CONSTITUTIONAL NEGATIVE: 1
BRUISES/BLEEDS EASILY: 0
GASTROINTESTINAL NEGATIVE: 1

## 2018-01-30 NOTE — PROGRESS NOTES
Subjective:   Joshua Nieves is a 84 y.o. male who presents today as a follow-up for his CAD and chronic angina. His dementia continues to progress and he is being placed in a home this week. He had to stop the spironolactone because it caused him diarrhea. Otherwise he is not complaining of any chest pain. Blood pressure is currently controlled. Lipids are currently at goal.      Past Medical History:   Diagnosis Date   • Abnormal CBC 8/4/2017   • Dementia with behavioral disturbance 8/11/2017   • Vitamin D deficiency disease 8/4/2017     History reviewed. No pertinent surgical history.  History reviewed. No pertinent family history.  History   Smoking Status   • Former Smoker   • Quit date: 1/1/1987   Smokeless Tobacco   • Never Used     Allergies   Allergen Reactions   • Spironolactone Diarrhea     Severe diarrhea     Outpatient Encounter Prescriptions as of 1/30/2018   Medication Sig Dispense Refill   • trazodone (DESYREL) 100 MG Tab TAKE 1 TABLET BY MOUTH AT BEDTIME 30 Tab 11   • ranolazine (RANEXA) 500 MG TABLET SR 12 HR Take 1 Tab by mouth 2 times a day. 60 Tab 11   • memantine (NAMENDA) 10 MG Tab Take 1 Tab by mouth 2 times a day. 180 Tab 3   • quetiapine (SEROQUEL) 25 MG Tab Take 2 Tabs by mouth 2 times a day. 120 Tab 6   • escitalopram (LEXAPRO) 20 MG tablet Take 1 Tab by mouth every day. 90 Tab 3   • atorvastatin (LIPITOR) 40 MG Tab Take 1 Tab by mouth every day. 90 Tab 3   • diltiazem CD (CARDIZEM CD) 120 MG CAPSULE SR 24 HR Take 1 Cap by mouth every day. 30 Cap 11   • [DISCONTINUED] spironolactone (ALDACTONE) 25 MG Tab Take 0.5 Tabs by mouth every day. (Patient not taking: Reported on 1/30/2018) 30 Tab 11   • quetiapine (SEROQUEL) 50 MG tablet Take 50 mg by mouth 2 times a day.       No facility-administered encounter medications on file as of 1/30/2018.      Review of Systems   Constitutional: Negative.  Negative for chills, fever and malaise/fatigue.   HENT: Negative.  Negative for sore throat.   "  Eyes: Negative.    Respiratory: Negative.  Negative for cough, hemoptysis, sputum production, shortness of breath, wheezing and stridor.    Cardiovascular: Negative.  Negative for chest pain, palpitations, orthopnea, claudication, leg swelling and PND.   Gastrointestinal: Negative.    Genitourinary: Negative.    Musculoskeletal: Negative.    Skin: Negative.    Neurological: Negative.  Negative for dizziness, loss of consciousness and weakness.   Endo/Heme/Allergies: Negative.  Does not bruise/bleed easily.   All other systems reviewed and are negative.       Objective:   /70   Pulse 70   Ht 1.727 m (5' 8\")   Wt 87.5 kg (193 lb)   SpO2 96%   BMI 29.35 kg/m²     Physical Exam   Constitutional: He is oriented to person, place, and time. He appears well-developed and well-nourished. No distress.   HENT:   Head: Normocephalic.   Mouth/Throat: Oropharynx is clear and moist.   Eyes: EOM are normal. Pupils are equal, round, and reactive to light. Right eye exhibits no discharge. Left eye exhibits no discharge. No scleral icterus.   Neck: Normal range of motion. Neck supple. No JVD present. No tracheal deviation present.   Cardiovascular: Normal rate, regular rhythm, S1 normal, S2 normal, normal heart sounds, intact distal pulses and normal pulses.  Exam reveals no gallop, no S3, no S4 and no friction rub.    No murmur heard.   No systolic murmur is present    No diastolic murmur is present   Pulses:       Carotid pulses are 2+ on the right side, and 2+ on the left side.       Radial pulses are 2+ on the right side, and 2+ on the left side.        Dorsalis pedis pulses are 2+ on the right side, and 2+ on the left side.        Posterior tibial pulses are 2+ on the right side, and 2+ on the left side.   Pulmonary/Chest: Effort normal and breath sounds normal. No respiratory distress. He has no wheezes. He has no rales.   Abdominal: Soft. Bowel sounds are normal. He exhibits no distension and no mass. There is no " tenderness. There is no rebound and no guarding.   Musculoskeletal: He exhibits no edema.   Neurological: He is alert and oriented to person, place, and time. No cranial nerve deficit.   Skin: Skin is warm and dry. He is not diaphoretic. No pallor.   Psychiatric: He has a normal mood and affect. His behavior is normal. Judgment and thought content normal.   Nursing note and vitals reviewed.      Assessment:     1. Angina at rest (CMS-HCC)     2. Coronary artery disease involving native coronary artery of native heart without angina pectoris     3. Pure hypercholesterolemia         Medical Decision Making:  Today's Assessment / Status / Plan:     84-year-old male with chronic angina hyperlipidemia and hypertension. I agree that it's okay for him to stop the spironolactone. He will continue on his antianginals as he is currently taking. We will see him back in 6 months.    Thank for you allowing me to take part in your patient's care, please call should you have any questions or would like to discuss this patient.

## 2018-01-30 NOTE — LETTER
[facility]   [address], [address]  [place] [address]  Phone: [phone]  Fax: [phone]              Joshua Nieves  4/30/1933    Encounter Date: 1/30/2018    Ced Horton M.D.          PROGRESS NOTE:  Subjective:   Joshua Nieves is a 84 y.o. male who presents today as a follow-up for his CAD and chronic angina. His dementia continues to progress and he is being placed in a home this week. He had to stop the spironolactone because it caused him diarrhea. Otherwise he is not complaining of any chest pain. Blood pressure is currently controlled. Lipids are currently at goal.      Past Medical History:   Diagnosis Date   • Abnormal CBC 8/4/2017   • Dementia with behavioral disturbance 8/11/2017   • Vitamin D deficiency disease 8/4/2017     History reviewed. No pertinent surgical history.  History reviewed. No pertinent family history.  History   Smoking Status   • Former Smoker   • Quit date: 1/1/1987   Smokeless Tobacco   • Never Used     Allergies   Allergen Reactions   • Spironolactone Diarrhea     Severe diarrhea     Outpatient Encounter Prescriptions as of 1/30/2018   Medication Sig Dispense Refill   • trazodone (DESYREL) 100 MG Tab TAKE 1 TABLET BY MOUTH AT BEDTIME 30 Tab 11   • ranolazine (RANEXA) 500 MG TABLET SR 12 HR Take 1 Tab by mouth 2 times a day. 60 Tab 11   • memantine (NAMENDA) 10 MG Tab Take 1 Tab by mouth 2 times a day. 180 Tab 3   • quetiapine (SEROQUEL) 25 MG Tab Take 2 Tabs by mouth 2 times a day. 120 Tab 6   • escitalopram (LEXAPRO) 20 MG tablet Take 1 Tab by mouth every day. 90 Tab 3   • atorvastatin (LIPITOR) 40 MG Tab Take 1 Tab by mouth every day. 90 Tab 3   • diltiazem CD (CARDIZEM CD) 120 MG CAPSULE SR 24 HR Take 1 Cap by mouth every day. 30 Cap 11   • [DISCONTINUED] spironolactone (ALDACTONE) 25 MG Tab Take 0.5 Tabs by mouth every day. (Patient not taking: Reported on 1/30/2018) 30 Tab 11   • quetiapine (SEROQUEL) 50 MG  "tablet Take 50 mg by mouth 2 times a day.       No facility-administered encounter medications on file as of 1/30/2018.      Review of Systems   Constitutional: Negative.  Negative for chills, fever and malaise/fatigue.   HENT: Negative.  Negative for sore throat.    Eyes: Negative.    Respiratory: Negative.  Negative for cough, hemoptysis, sputum production, shortness of breath, wheezing and stridor.    Cardiovascular: Negative.  Negative for chest pain, palpitations, orthopnea, claudication, leg swelling and PND.   Gastrointestinal: Negative.    Genitourinary: Negative.    Musculoskeletal: Negative.    Skin: Negative.    Neurological: Negative.  Negative for dizziness, loss of consciousness and weakness.   Endo/Heme/Allergies: Negative.  Does not bruise/bleed easily.   All other systems reviewed and are negative.       Objective:   /70   Pulse 70   Ht 1.727 m (5' 8\")   Wt 87.5 kg (193 lb)   SpO2 96%   BMI 29.35 kg/m²      Physical Exam   Constitutional: He is oriented to person, place, and time. He appears well-developed and well-nourished. No distress.   HENT:   Head: Normocephalic.   Mouth/Throat: Oropharynx is clear and moist.   Eyes: EOM are normal. Pupils are equal, round, and reactive to light. Right eye exhibits no discharge. Left eye exhibits no discharge. No scleral icterus.   Neck: Normal range of motion. Neck supple. No JVD present. No tracheal deviation present.   Cardiovascular: Normal rate, regular rhythm, S1 normal, S2 normal, normal heart sounds, intact distal pulses and normal pulses.  Exam reveals no gallop, no S3, no S4 and no friction rub.    No murmur heard.   No systolic murmur is present    No diastolic murmur is present   Pulses:       Carotid pulses are 2+ on the right side, and 2+ on the left side.       Radial pulses are 2+ on the right side, and 2+ on the left side.        Dorsalis pedis pulses are 2+ on the right side, and 2+ on the left side.        Posterior tibial pulses " are 2+ on the right side, and 2+ on the left side.   Pulmonary/Chest: Effort normal and breath sounds normal. No respiratory distress. He has no wheezes. He has no rales.   Abdominal: Soft. Bowel sounds are normal. He exhibits no distension and no mass. There is no tenderness. There is no rebound and no guarding.   Musculoskeletal: He exhibits no edema.   Neurological: He is alert and oriented to person, place, and time. No cranial nerve deficit.   Skin: Skin is warm and dry. He is not diaphoretic. No pallor.   Psychiatric: He has a normal mood and affect. His behavior is normal. Judgment and thought content normal.   Nursing note and vitals reviewed.      Assessment:     1. Angina at rest (CMS-HCC)     2. Coronary artery disease involving native coronary artery of native heart without angina pectoris     3. Pure hypercholesterolemia         Medical Decision Making:  Today's Assessment / Status / Plan:     84-year-old male with chronic angina hyperlipidemia and hypertension. I agree that it's okay for him to stop the spironolactone. He will continue on his antianginals as he is currently taking. We will see him back in 6 months.    Thank for you allowing me to take part in your patient's care, please call should you have any questions or would like to discuss this patient.      Sourav Stevenson D.O.  [address]  [address]  [place] [address]  VIA In Basket

## 2018-01-31 ENCOUNTER — OFFICE VISIT (OUTPATIENT)
Dept: NEUROLOGY | Facility: MEDICAL CENTER | Age: 83
End: 2018-01-31
Payer: MEDICARE

## 2018-01-31 VITALS
SYSTOLIC BLOOD PRESSURE: 168 MMHG | DIASTOLIC BLOOD PRESSURE: 58 MMHG | WEIGHT: 194.1 LBS | OXYGEN SATURATION: 94 % | HEART RATE: 85 BPM | TEMPERATURE: 97.7 F | HEIGHT: 68 IN | BODY MASS INDEX: 29.42 KG/M2

## 2018-01-31 DIAGNOSIS — G30.1 LATE ONSET ALZHEIMER'S DISEASE WITH BEHAVIORAL DISTURBANCE (HCC): Primary | ICD-10-CM

## 2018-01-31 DIAGNOSIS — F02.818 LATE ONSET ALZHEIMER'S DISEASE WITH BEHAVIORAL DISTURBANCE (HCC): Primary | ICD-10-CM

## 2018-01-31 PROCEDURE — 99214 OFFICE O/P EST MOD 30 MIN: CPT | Performed by: PSYCHIATRY & NEUROLOGY

## 2018-01-31 RX ORDER — MEMANTINE HYDROCHLORIDE 10 MG/1
10 TABLET ORAL 2 TIMES DAILY
Qty: 180 TAB | Refills: 3
Start: 2018-01-31 | End: 2018-10-24 | Stop reason: SDUPTHER

## 2018-01-31 RX ORDER — ESCITALOPRAM OXALATE 20 MG/1
20 TABLET ORAL DAILY
Qty: 90 TAB | Refills: 3
Start: 2018-01-31 | End: 2018-10-24 | Stop reason: SDUPTHER

## 2018-01-31 RX ORDER — QUETIAPINE FUMARATE 50 MG/1
50 TABLET, FILM COATED ORAL 2 TIMES DAILY
Qty: 60 TAB | Refills: 11
Start: 2018-01-31 | End: 2018-11-08

## 2018-01-31 ASSESSMENT — PAIN SCALES - GENERAL: PAINLEVEL: NO PAIN

## 2018-01-31 NOTE — PROGRESS NOTES
"Subjective:      Joshua Nieves is a 84 y.o. male who presents with his son Gerardo, for follow-up, with a history of moderate to severe dementia.     HPI    Since last seen, Joshua's behavior has continued to be problematic, though the outbursts have become less problematic on Seroquel 50 mg, twice a day, increased from 25 mg twice a day when last seen. He seems to be tolerating the dose without issue. He remains on Namenda 10 mg, twice a day. The diarrhea he had on donepezil has resolved.    Gerardo has made arrangements for him to move to an assisted living circumstance, in the anticipation eventually of moving to the memory sanchez that they have available. The patient is not aware of this change, something that is most ideal. Joshua feels that things are well. He does take his medications when given to him by Gerardo, he walks with a walker but has not been falling. He seems to enjoy watching TV and sporting events, often translating them into political commentary, Democrats versus Republicans on each side of the cord. There is still the incontinence. He is eating well. There are no issues with nighttime hallucinations or agitation on the higher dose of Seroquel.    Medical, surgical and family history is reviewed, there are no new drug allergies. He remains on Lexapro 20 mg daily, Namenda 10 mg twice a day, Seroquel 50 mg, twice a day, trazodone, Lipitor, Cardizem, the rest as per the electronic health record, noncontributory from my standpoint.    Review of Systems   Unable to perform ROS: Dementia        Objective:     BP (!) 168/58   Pulse 85   Temp 36.5 °C (97.7 °F)   Ht 1.727 m (5' 8\")   Wt 88 kg (194 lb 1.6 oz)   SpO2 94%   BMI 29.51 kg/m²      Physical Exam    He appears in no acute distress. Vital signs do reveal slight elevation blood pressure 168/58. He is slightly unkempt in appearance, there were food stains down his shirt. He is certainly indifferent to this. He is cooperative. There is no malar " "rash. The neck is supple, cardiac evaluation is unremarkable.    He knows that I'm a doctor, but otherwise knows nothing of the date nor his location. He knows nothing of his diagnosis, stating only that I control his \"bloods\". He is perseverative. He names and repeats, there is no apraxia or inattention.    Cranial exam reveals no hypophonia or bradykinesia, visual fields are grossly full, facial movements are symmetric. There is no tremor, tone is slightly increased, there is no rigidity. There is no gross hemiparesis. He walks with a walker, stride length is only minimally shortened, he does not shuffle. There is no appendicular dystaxia.     Assessment/Plan:     1. Late onset Alzheimer's disease with behavioral disturbance  I agree with the transfer to an assisted living facility, it gives Gerardo his life back. Eventually Joshua will likely need the memory unit at this care facility, when cannot be predicted. We will continue the Seroquel and Namenda unchanged, Lexapro and trazodone will be continued symptomatically. I will follow-up in 6 months.    - quetiapine (SEROQUEL) 50 MG tablet; Take 1 Tab by mouth 2 times a day.  Dispense: 60 Tab; Refill: 11  - memantine (NAMENDA) 10 MG Tab; Take 1 Tab by mouth 2 times a day.  Dispense: 180 Tab; Refill: 3  - escitalopram (LEXAPRO) 20 MG tablet; Take 1 Tab by mouth every day.  Dispense: 90 Tab; Refill: 3    Time: Evaluation of 25 minutes for exam, review, discussion, and education  Discussion: As mentioned in the assessment, over 70% of the time spent face-to-face counseling and coordinating care  "

## 2018-02-02 ENCOUNTER — TELEPHONE (OUTPATIENT)
Dept: MEDICAL GROUP | Facility: CLINIC | Age: 83
End: 2018-02-02

## 2018-02-02 DIAGNOSIS — J06.9 UPPER RESPIRATORY TRACT INFECTION, UNSPECIFIED TYPE: ICD-10-CM

## 2018-02-02 RX ORDER — ACETAMINOPHEN 325 MG/1
650 TABLET ORAL EVERY 4 HOURS PRN
Qty: 30 TAB | Refills: 3 | Status: SHIPPED
Start: 2018-02-02 | End: 2019-03-28

## 2018-02-02 RX ORDER — AMOXICILLIN AND CLAVULANATE POTASSIUM 875; 125 MG/1; MG/1
1 TABLET, FILM COATED ORAL 2 TIMES DAILY
Qty: 14 TAB | Refills: 0 | Status: SHIPPED
Start: 2018-02-02 | End: 2018-08-01

## 2018-02-02 NOTE — TELEPHONE ENCOUNTER
Telephone call returned, informed Gerardo that his father was ill at [place], antibiotics have been cold in and Tylenol also fax was also sent [place]. 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 Would like to know the color the sputum if yellowish encouraged use of antibiotics if clear watchful waiting treated only with Tylenol offered urgent care as an alternative due to scheduling conflicts

## 2018-02-02 NOTE — TELEPHONE ENCOUNTER
VOICEMAIL  1. Caller Name: Ronn Zamora                      Call Back Number: [phone]    2. Message: Patient has a productive cough and a fever of 99.9 degrees Farenheit. Requesting order for antibiotic and order to administer tylenol at assisted living. Or does he need to be seen in the office? Please advise, thank you     3. Patient approves office to leave a detailed voicemail/MyChart message: N\A

## 2018-04-16 ENCOUNTER — OFFICE VISIT (OUTPATIENT)
Dept: MEDICAL GROUP | Facility: CLINIC | Age: 83
End: 2018-04-16
Payer: MEDICARE

## 2018-04-16 VITALS
WEIGHT: 200 LBS | HEART RATE: 74 BPM | HEIGHT: 68 IN | DIASTOLIC BLOOD PRESSURE: 88 MMHG | OXYGEN SATURATION: 94 % | BODY MASS INDEX: 30.31 KG/M2 | TEMPERATURE: 97.5 F | RESPIRATION RATE: 16 BRPM | SYSTOLIC BLOOD PRESSURE: 135 MMHG

## 2018-04-16 DIAGNOSIS — G30.1 LATE ONSET ALZHEIMER'S DISEASE WITH BEHAVIORAL DISTURBANCE (HCC): ICD-10-CM

## 2018-04-16 DIAGNOSIS — F02.818 LATE ONSET ALZHEIMER'S DISEASE WITH BEHAVIORAL DISTURBANCE (HCC): ICD-10-CM

## 2018-04-16 DIAGNOSIS — R79.89 ABNORMAL CBC: ICD-10-CM

## 2018-04-16 PROCEDURE — 99213 OFFICE O/P EST LOW 20 MIN: CPT | Performed by: INTERNAL MEDICINE

## 2018-07-25 DIAGNOSIS — I10 ESSENTIAL HYPERTENSION: Primary | ICD-10-CM

## 2018-07-25 RX ORDER — DILTIAZEM HYDROCHLORIDE 120 MG/1
CAPSULE, EXTENDED RELEASE ORAL
Qty: 90 CAP | Refills: 2 | Status: SHIPPED | OUTPATIENT
Start: 2018-07-25 | End: 2018-10-03 | Stop reason: RX

## 2018-08-01 ENCOUNTER — OFFICE VISIT (OUTPATIENT)
Dept: CARDIOLOGY | Facility: MEDICAL CENTER | Age: 83
End: 2018-08-01
Payer: MEDICARE

## 2018-08-01 VITALS
WEIGHT: 198 LBS | DIASTOLIC BLOOD PRESSURE: 74 MMHG | HEART RATE: 74 BPM | HEIGHT: 68 IN | OXYGEN SATURATION: 92 % | BODY MASS INDEX: 30.01 KG/M2 | SYSTOLIC BLOOD PRESSURE: 132 MMHG

## 2018-08-01 DIAGNOSIS — E78.00 PURE HYPERCHOLESTEROLEMIA: ICD-10-CM

## 2018-08-01 DIAGNOSIS — G30.1 LATE ONSET ALZHEIMER'S DISEASE WITH BEHAVIORAL DISTURBANCE (HCC): ICD-10-CM

## 2018-08-01 DIAGNOSIS — F02.818 LATE ONSET ALZHEIMER'S DISEASE WITH BEHAVIORAL DISTURBANCE (HCC): ICD-10-CM

## 2018-08-01 DIAGNOSIS — I25.10 CORONARY ARTERY DISEASE INVOLVING NATIVE CORONARY ARTERY OF NATIVE HEART WITHOUT ANGINA PECTORIS: ICD-10-CM

## 2018-08-01 DIAGNOSIS — I20.89 ANGINA AT REST: ICD-10-CM

## 2018-08-01 PROCEDURE — 99214 OFFICE O/P EST MOD 30 MIN: CPT | Performed by: INTERNAL MEDICINE

## 2018-08-01 ASSESSMENT — ENCOUNTER SYMPTOMS
CHILLS: 0
SHORTNESS OF BREATH: 0
HEMOPTYSIS: 0
DIZZINESS: 0
PND: 0
WHEEZING: 0
WEIGHT LOSS: 1
EYES NEGATIVE: 1
PALPITATIONS: 0
ORTHOPNEA: 0
SPUTUM PRODUCTION: 0
COUGH: 0
MUSCULOSKELETAL NEGATIVE: 1
CLAUDICATION: 0
GASTROINTESTINAL NEGATIVE: 1
FEVER: 0
WEAKNESS: 0
RESPIRATORY NEGATIVE: 1
SORE THROAT: 0
STRIDOR: 0
BRUISES/BLEEDS EASILY: 0
CARDIOVASCULAR NEGATIVE: 1
LOSS OF CONSCIOUSNESS: 0
NEUROLOGICAL NEGATIVE: 1

## 2018-08-01 NOTE — LETTER
[facility]   [address]  [place] [address]  Phone: [phone]  Fax: [phone]              Joshua Nieves  4/30/1933    Encounter Date: 8/1/2018    Ced Horton M.D.          PROGRESS NOTE:  Chief Complaint   Patient presents with   • Coronary Artery Disease     F/V: 6 MO       Subjective:   Joshua Nieves is a 85 y.o. male who presents today as a follow-up for his angina and CAD.  He continues to progress in his Alzheimer's.  His son reports that he is now having about 40% of his baseline memory.  He has not been complaining of any chest pain or shortness of breath.  He is due to  a refill of his atorvastatin today.  He has had no changes in his medical status.    Past Medical History:   Diagnosis Date   • Abnormal CBC 8/4/2017   • Late onset Alzheimer's disease with behavioral disturbance 1/31/2018   • Vitamin D deficiency disease 8/4/2017     History reviewed. No pertinent surgical history.  History reviewed. No pertinent family history.  Social History     Social History   • Marital status:      Spouse name: N/A   • Number of children: N/A   • Years of education: N/A     Occupational History   • Not on file.     Social History Main Topics   • Smoking status: Former Smoker     Quit date: 1/1/1987   • Smokeless tobacco: Never Used   • Alcohol use No   • Drug use: No   • Sexual activity: No     Other Topics Concern   • Not on file     Social History Narrative   • No narrative on file     Allergies   Allergen Reactions   • Spironolactone Diarrhea     Severe diarrhea     Outpatient Encounter Prescriptions as of 8/1/2018   Medication Sig Dispense Refill   • diltiazem (CARDIZEM SR) 120 MG SR capsule TAKE 1 CAPSULE BY MOUTH EVERYDAY 90 Cap 2   • acetaminophen (TYLENOL) 325 MG Tab Take 2 Tabs by mouth every four hours as needed (fever/pain). 30 Tab 3   • quetiapine (SEROQUEL) 50 MG tablet Take 1 Tab by mouth 2 times a day. 60 Tab  "11   • memantine (NAMENDA) 10 MG Tab Take 1 Tab by mouth 2 times a day. 180 Tab 3   • escitalopram (LEXAPRO) 20 MG tablet Take 1 Tab by mouth every day. 90 Tab 3   • trazodone (DESYREL) 100 MG Tab TAKE 1 TABLET BY MOUTH AT BEDTIME 30 Tab 11   • ranolazine (RANEXA) 500 MG TABLET SR 12 HR Take 1 Tab by mouth 2 times a day. 60 Tab 11   • [DISCONTINUED] amoxicillin-clavulanate (AUGMENTIN) 875-125 MG Tab Take 1 Tab by mouth 2 times a day. 14 Tab 0   • [DISCONTINUED] atorvastatin (LIPITOR) 40 MG Tab Take 1 Tab by mouth every day. 90 Tab 3   • [DISCONTINUED] diltiazem CD (CARDIZEM CD) 120 MG CAPSULE SR 24 HR Take 1 Cap by mouth every day. 30 Cap 11     No facility-administered encounter medications on file as of 8/1/2018.      Review of Systems   Constitutional: Positive for weight loss. Negative for chills, fever and malaise/fatigue.   HENT: Negative.  Negative for sore throat.    Eyes: Negative.    Respiratory: Negative.  Negative for cough, hemoptysis, sputum production, shortness of breath, wheezing and stridor.    Cardiovascular: Negative.  Negative for chest pain, palpitations, orthopnea, claudication, leg swelling and PND.   Gastrointestinal: Negative.    Genitourinary: Negative.    Musculoskeletal: Negative.    Skin: Negative.    Neurological: Negative.  Negative for dizziness, loss of consciousness and weakness.   Endo/Heme/Allergies: Negative.  Does not bruise/bleed easily.   All other systems reviewed and are negative.       Objective:   /74   Pulse 74   Ht 1.727 m (5' 8\")   Wt 89.8 kg (198 lb)   SpO2 92%   BMI 30.11 kg/m²      Physical Exam   Constitutional: He appears well-developed and well-nourished. No distress.   HENT:   Head: Normocephalic and atraumatic.   Right Ear: External ear normal.   Left Ear: External ear normal.   Nose: Nose normal.   Mouth/Throat: No oropharyngeal exudate.   Eyes: Pupils are equal, round, and reactive to light. Conjunctivae and EOM are normal. Right eye exhibits no " discharge. Left eye exhibits no discharge. No scleral icterus.   Neck: Neck supple. No JVD present.   Cardiovascular: Normal rate, regular rhythm and intact distal pulses.  Exam reveals no gallop and no friction rub.    No murmur heard.  Pulmonary/Chest: Effort normal. No stridor. No respiratory distress. He has no wheezes. He has no rales. He exhibits no tenderness.   Abdominal: Soft. He exhibits no distension. There is no guarding.   Musculoskeletal: Normal range of motion. He exhibits no edema, tenderness or deformity.   Neurological: He is alert. He has normal reflexes. He displays normal reflexes. No cranial nerve deficit. He exhibits normal muscle tone. Coordination normal.   Skin: Skin is warm and dry. No rash noted. He is not diaphoretic. No erythema. No pallor.   Psychiatric: He has a normal mood and affect. His behavior is normal. Judgment and thought content normal.   Nursing note and vitals reviewed.      Assessment:     1. Pure hypercholesterolemia     2. Late onset Alzheimer's disease with behavioral disturbance     3. Coronary artery disease involving native coronary artery of native heart without angina pectoris     4. Angina at rest (HCC)         Medical Decision Making:  Today's Assessment / Status / Plan:     85-year-old male with CAD angina as well as Alzheimer's disease.  I did discuss with his son today stopping his lipid-lowering medications given the perceived lack of benefit.  Additionally given his symptoms and his Alzheimer's if anything lipid-lowering medications would simply prolong his life with no benefit to quality.  We will stop that after his next refill.  Otherwise we will see him back in 6 months.    Thank for you allowing me to take part in your patient's care, please call should you have any questions or would like to discuss this patient.      Sourav Stevenson D.O.  [address]  [place] [address]  VIA In Basket

## 2018-08-01 NOTE — PROGRESS NOTES
Chief Complaint   Patient presents with   • Coronary Artery Disease     F/V: 6 MO       Subjective:   Joshua Nieves is a 85 y.o. male who presents today as a follow-up for his angina and CAD.  He continues to progress in his Alzheimer's.  His son reports that he is now having about 40% of his baseline memory.  He has not been complaining of any chest pain or shortness of breath.  He is due to  a refill of his atorvastatin today.  He has had no changes in his medical status.    Past Medical History:   Diagnosis Date   • Abnormal CBC 8/4/2017   • Late onset Alzheimer's disease with behavioral disturbance 1/31/2018   • Vitamin D deficiency disease 8/4/2017     History reviewed. No pertinent surgical history.  History reviewed. No pertinent family history.  Social History     Social History   • Marital status:      Spouse name: N/A   • Number of children: N/A   • Years of education: N/A     Occupational History   • Not on file.     Social History Main Topics   • Smoking status: Former Smoker     Quit date: 1/1/1987   • Smokeless tobacco: Never Used   • Alcohol use No   • Drug use: No   • Sexual activity: No     Other Topics Concern   • Not on file     Social History Narrative   • No narrative on file     Allergies   Allergen Reactions   • Spironolactone Diarrhea     Severe diarrhea     Outpatient Encounter Prescriptions as of 8/1/2018   Medication Sig Dispense Refill   • diltiazem (CARDIZEM SR) 120 MG SR capsule TAKE 1 CAPSULE BY MOUTH EVERYDAY 90 Cap 2   • acetaminophen (TYLENOL) 325 MG Tab Take 2 Tabs by mouth every four hours as needed (fever/pain). 30 Tab 3   • quetiapine (SEROQUEL) 50 MG tablet Take 1 Tab by mouth 2 times a day. 60 Tab 11   • memantine (NAMENDA) 10 MG Tab Take 1 Tab by mouth 2 times a day. 180 Tab 3   • escitalopram (LEXAPRO) 20 MG tablet Take 1 Tab by mouth every day. 90 Tab 3   • trazodone (DESYREL) 100 MG Tab TAKE 1 TABLET BY MOUTH AT BEDTIME 30 Tab 11   • ranolazine (RANEXA)  "500 MG TABLET SR 12 HR Take 1 Tab by mouth 2 times a day. 60 Tab 11   • [DISCONTINUED] amoxicillin-clavulanate (AUGMENTIN) 875-125 MG Tab Take 1 Tab by mouth 2 times a day. 14 Tab 0   • [DISCONTINUED] atorvastatin (LIPITOR) 40 MG Tab Take 1 Tab by mouth every day. 90 Tab 3   • [DISCONTINUED] diltiazem CD (CARDIZEM CD) 120 MG CAPSULE SR 24 HR Take 1 Cap by mouth every day. 30 Cap 11     No facility-administered encounter medications on file as of 8/1/2018.      Review of Systems   Constitutional: Positive for weight loss. Negative for chills, fever and malaise/fatigue.   HENT: Negative.  Negative for sore throat.    Eyes: Negative.    Respiratory: Negative.  Negative for cough, hemoptysis, sputum production, shortness of breath, wheezing and stridor.    Cardiovascular: Negative.  Negative for chest pain, palpitations, orthopnea, claudication, leg swelling and PND.   Gastrointestinal: Negative.    Genitourinary: Negative.    Musculoskeletal: Negative.    Skin: Negative.    Neurological: Negative.  Negative for dizziness, loss of consciousness and weakness.   Endo/Heme/Allergies: Negative.  Does not bruise/bleed easily.   All other systems reviewed and are negative.       Objective:   /74   Pulse 74   Ht 1.727 m (5' 8\")   Wt 89.8 kg (198 lb)   SpO2 92%   BMI 30.11 kg/m²     Physical Exam   Constitutional: He appears well-developed and well-nourished. No distress.   HENT:   Head: Normocephalic and atraumatic.   Right Ear: External ear normal.   Left Ear: External ear normal.   Nose: Nose normal.   Mouth/Throat: No oropharyngeal exudate.   Eyes: Pupils are equal, round, and reactive to light. Conjunctivae and EOM are normal. Right eye exhibits no discharge. Left eye exhibits no discharge. No scleral icterus.   Neck: Neck supple. No JVD present.   Cardiovascular: Normal rate, regular rhythm and intact distal pulses.  Exam reveals no gallop and no friction rub.    No murmur heard.  Pulmonary/Chest: Effort " normal. No stridor. No respiratory distress. He has no wheezes. He has no rales. He exhibits no tenderness.   Abdominal: Soft. He exhibits no distension. There is no guarding.   Musculoskeletal: Normal range of motion. He exhibits no edema, tenderness or deformity.   Neurological: He is alert. He has normal reflexes. He displays normal reflexes. No cranial nerve deficit. He exhibits normal muscle tone. Coordination normal.   Skin: Skin is warm and dry. No rash noted. He is not diaphoretic. No erythema. No pallor.   Psychiatric: He has a normal mood and affect. His behavior is normal. Judgment and thought content normal.   Nursing note and vitals reviewed.      Assessment:     1. Pure hypercholesterolemia     2. Late onset Alzheimer's disease with behavioral disturbance     3. Coronary artery disease involving native coronary artery of native heart without angina pectoris     4. Angina at rest (HCC)         Medical Decision Making:  Today's Assessment / Status / Plan:     85-year-old male with CAD angina as well as Alzheimer's disease.  I did discuss with his son today stopping his lipid-lowering medications given the perceived lack of benefit.  Additionally given his symptoms and his Alzheimer's if anything lipid-lowering medications would simply prolong his life with no benefit to quality.  We will stop that after his next refill.  Otherwise we will see him back in 6 months.    Thank for you allowing me to take part in your patient's care, please call should you have any questions or would like to discuss this patient.

## 2018-08-29 ENCOUNTER — OFFICE VISIT (OUTPATIENT)
Dept: URGENT CARE | Facility: CLINIC | Age: 83
End: 2018-08-29
Payer: MEDICARE

## 2018-08-29 VITALS
HEIGHT: 69 IN | TEMPERATURE: 97 F | WEIGHT: 202 LBS | SYSTOLIC BLOOD PRESSURE: 118 MMHG | OXYGEN SATURATION: 96 % | BODY MASS INDEX: 29.92 KG/M2 | HEART RATE: 76 BPM | DIASTOLIC BLOOD PRESSURE: 80 MMHG | RESPIRATION RATE: 20 BRPM

## 2018-08-29 DIAGNOSIS — L08.9 SKIN INFECTION: ICD-10-CM

## 2018-08-29 DIAGNOSIS — S59.902A INJURY OF LEFT ELBOW, INITIAL ENCOUNTER: ICD-10-CM
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
## 2018-08-29 PROCEDURE — 99214 OFFICE O/P EST MOD 30 MIN: CPT | Performed by: PHYSICIAN ASSISTANT

## 2018-08-29 RX ORDER — CEFUROXIME AXETIL 500 MG/1
500 TABLET ORAL 2 TIMES DAILY
Qty: 20 TAB | Refills: 0 | Status: SHIPPED | OUTPATIENT
Start: 2018-08-29 | End: 2018-09-08

## 2018-08-29 RX ORDER — ATORVASTATIN CALCIUM 40 MG/1
40 TABLET, FILM COATED ORAL NIGHTLY
COMMUNITY
End: 2018-11-08

## 2018-08-29 ASSESSMENT — ENCOUNTER SYMPTOMS
NEUROLOGICAL NEGATIVE: 1
WEAKNESS: 0
JOINT SWELLING: 0
CONSTITUTIONAL NEGATIVE: 1
FALLS: 1
FOCAL WEAKNESS: 0
NUMBNESS: 0

## 2018-08-29 NOTE — PROGRESS NOTES
Subjective:      Joshua Nieves is a 85 y.o. male who presents with Elbow Injury (x1week, left elbow injury, painful, scrape)            Other   This is a new problem. The current episode started in the past 7 days (L elbow inj/abr). The problem occurs constantly. The problem has been unchanged. Pertinent negatives include no joint swelling, numbness or weakness. The symptoms are aggravated by bending. He has tried rest for the symptoms. The treatment provided no relief.       Review of Systems   Constitutional: Negative.    Musculoskeletal: Positive for falls and joint pain. Negative for joint swelling.   Skin: Negative.    Neurological: Negative.  Negative for focal weakness, weakness and numbness.          Objective:     There were no vitals taken for this visit.     Physical Exam   Constitutional: He is oriented to person, place, and time. He appears well-developed and well-nourished. No distress.   Musculoskeletal: Normal range of motion. He exhibits tenderness (L elbow scab, sl red, swollen skin infection; some olec bursitis (chronic)). He exhibits no edema.   Neurological: He is alert and oriented to person, place, and time. No sensory deficit. He exhibits normal muscle tone. Coordination normal.   Skin: Skin is warm and dry. There is erythema.   Psychiatric: He has a normal mood and affect. His behavior is normal.   Nursing note and vitals reviewed.    Active Ambulatory Problems     Diagnosis Date Noted   • Vitamin D deficiency disease 08/04/2017   • Abnormal CBC 08/04/2017   • Angina at rest (HCC) 08/08/2017   • Coronary artery disease involving native coronary artery of native heart without angina pectoris 08/08/2017   • Pure hypercholesterolemia 08/08/2017   • Late onset Alzheimer's disease with behavioral disturbance 01/31/2018     Resolved Ambulatory Problems     Diagnosis Date Noted   • No Resolved Ambulatory Problems     Past Medical History:   Diagnosis Date   • Abnormal CBC 8/4/2017   • Late onset  Alzheimer's disease with behavioral disturbance 1/31/2018   • Vitamin D deficiency disease 8/4/2017     Current Outpatient Prescriptions on File Prior to Visit   Medication Sig Dispense Refill   • diltiazem (CARDIZEM SR) 120 MG SR capsule TAKE 1 CAPSULE BY MOUTH EVERYDAY 90 Cap 2   • acetaminophen (TYLENOL) 325 MG Tab Take 2 Tabs by mouth every four hours as needed (fever/pain). 30 Tab 3   • quetiapine (SEROQUEL) 50 MG tablet Take 1 Tab by mouth 2 times a day. 60 Tab 11   • memantine (NAMENDA) 10 MG Tab Take 1 Tab by mouth 2 times a day. 180 Tab 3   • escitalopram (LEXAPRO) 20 MG tablet Take 1 Tab by mouth every day. 90 Tab 3   • trazodone (DESYREL) 100 MG Tab TAKE 1 TABLET BY MOUTH AT BEDTIME 30 Tab 11   • ranolazine (RANEXA) 500 MG TABLET SR 12 HR Take 1 Tab by mouth 2 times a day. 60 Tab 11     No current facility-administered medications on file prior to visit.      Social History     Social History   • Marital status:      Spouse name: N/A   • Number of children: N/A   • Years of education: N/A     Occupational History   • Not on file.     Social History Main Topics   • Smoking status: Former Smoker     Quit date: 1/1/1987   • Smokeless tobacco: Never Used   • Alcohol use No   • Drug use: No   • Sexual activity: No     Other Topics Concern   • Not on file     Social History Narrative   • No narrative on file     History reviewed. No pertinent family history.  Spironolactone              Assessment/Plan:     ·  fall; L elbow inj; skin infection [some chronic bursitis]      · rx meds; ; Wound care refer

## 2018-08-29 NOTE — LETTER
August 29, 2018         Patient: Joshua Nieves   YOB: 1933   Date of Visit: 8/29/2018           To Whom it May Concern:    Joshua Nieves was seen in my clinic on 8/29/2018 for skin infection of left elbow; recommend daily antibiotic ointment [like Neosporin or similar; or prescribed] with gauze change once or twice a day for 10 days.      If you have any questions or concerns, please don't hesitate to call.        Sincerely,           Reymundo Beasley P.A.-C.  Electronically Signed

## 2018-09-14 ENCOUNTER — APPOINTMENT (OUTPATIENT)
Dept: WOUND CARE | Facility: MEDICAL CENTER | Age: 83
End: 2018-09-14
Attending: PHYSICIAN ASSISTANT
Payer: MEDICARE

## 2018-09-21 ENCOUNTER — APPOINTMENT (OUTPATIENT)
Dept: WOUND CARE | Facility: MEDICAL CENTER | Age: 83
End: 2018-09-21
Attending: PHYSICIAN ASSISTANT
Payer: MEDICARE

## 2018-09-26 ENCOUNTER — APPOINTMENT (OUTPATIENT)
Dept: WOUND CARE | Facility: MEDICAL CENTER | Age: 83
End: 2018-09-26
Attending: PHYSICIAN ASSISTANT
Payer: MEDICARE

## 2018-10-03 DIAGNOSIS — I25.10 CORONARY ARTERY DISEASE INVOLVING NATIVE CORONARY ARTERY OF NATIVE HEART WITHOUT ANGINA PECTORIS: ICD-10-CM

## 2018-10-03 DIAGNOSIS — I10 ESSENTIAL HYPERTENSION: ICD-10-CM

## 2018-10-03 RX ORDER — DILTIAZEM HYDROCHLORIDE 120 MG/1
120 CAPSULE, COATED, EXTENDED RELEASE ORAL DAILY
Qty: 90 CAP | Refills: 2 | Status: SHIPPED | OUTPATIENT
Start: 2018-10-03 | End: 2019-10-26

## 2018-10-10 ENCOUNTER — HOSPITAL ENCOUNTER (EMERGENCY)
Facility: MEDICAL CENTER | Age: 83
End: 2018-10-10
Attending: EMERGENCY MEDICINE
Payer: MEDICARE

## 2018-10-10 ENCOUNTER — APPOINTMENT (OUTPATIENT)
Dept: RADIOLOGY | Facility: MEDICAL CENTER | Age: 83
End: 2018-10-10
Attending: EMERGENCY MEDICINE
Payer: MEDICARE

## 2018-10-10 VITALS
SYSTOLIC BLOOD PRESSURE: 150 MMHG | HEIGHT: 69 IN | DIASTOLIC BLOOD PRESSURE: 87 MMHG | OXYGEN SATURATION: 95 % | BODY MASS INDEX: 29.62 KG/M2 | WEIGHT: 200 LBS | TEMPERATURE: 97.8 F | HEART RATE: 75 BPM | RESPIRATION RATE: 17 BRPM

## 2018-10-10 DIAGNOSIS — R10.9 FLANK PAIN: ICD-10-CM

## 2018-10-10 DIAGNOSIS — R10.84 GENERALIZED ABDOMINAL PAIN: ICD-10-CM

## 2018-10-10 DIAGNOSIS — F03.91 DEMENTIA WITH BEHAVIORAL DISTURBANCE, UNSPECIFIED DEMENTIA TYPE: ICD-10-CM

## 2018-10-10 LAB
ALBUMIN SERPL BCP-MCNC: 4.4 G/DL (ref 3.2–4.9)
ALBUMIN/GLOB SERPL: 1.9 G/DL
ALP SERPL-CCNC: 68 U/L (ref 30–99)
ALT SERPL-CCNC: 10 U/L (ref 2–50)
ANION GAP SERPL CALC-SCNC: 8 MMOL/L (ref 0–11.9)
APPEARANCE UR: CLEAR
AST SERPL-CCNC: 16 U/L (ref 12–45)
BACTERIA #/AREA URNS HPF: NEGATIVE /HPF
BASOPHILS # BLD AUTO: 1.2 % (ref 0–1.8)
BASOPHILS # BLD: 0.08 K/UL (ref 0–0.12)
BILIRUB SERPL-MCNC: 0.4 MG/DL (ref 0.1–1.5)
BILIRUB UR QL STRIP.AUTO: NEGATIVE
BUN SERPL-MCNC: 19 MG/DL (ref 8–22)
CALCIUM SERPL-MCNC: 9.6 MG/DL (ref 8.5–10.5)
CHLORIDE SERPL-SCNC: 106 MMOL/L (ref 96–112)
CO2 SERPL-SCNC: 25 MMOL/L (ref 20–33)
COLOR UR: YELLOW
CREAT SERPL-MCNC: 1.6 MG/DL (ref 0.5–1.4)
EOSINOPHIL # BLD AUTO: 0.19 K/UL (ref 0–0.51)
EOSINOPHIL NFR BLD: 2.8 % (ref 0–6.9)
EPI CELLS #/AREA URNS HPF: NEGATIVE /HPF
ERYTHROCYTE [DISTWIDTH] IN BLOOD BY AUTOMATED COUNT: 50.4 FL (ref 35.9–50)
GLOBULIN SER CALC-MCNC: 2.3 G/DL (ref 1.9–3.5)
GLUCOSE SERPL-MCNC: 92 MG/DL (ref 65–99)
GLUCOSE UR STRIP.AUTO-MCNC: NEGATIVE MG/DL
HCT VFR BLD AUTO: 46.7 % (ref 42–52)
HGB BLD-MCNC: 15.1 G/DL (ref 14–18)
HYALINE CASTS #/AREA URNS LPF: ABNORMAL /LPF
IMM GRANULOCYTES # BLD AUTO: 0.02 K/UL (ref 0–0.11)
IMM GRANULOCYTES NFR BLD AUTO: 0.3 % (ref 0–0.9)
KETONES UR STRIP.AUTO-MCNC: NEGATIVE MG/DL
LEUKOCYTE ESTERASE UR QL STRIP.AUTO: ABNORMAL
LIPASE SERPL-CCNC: 15 U/L (ref 11–82)
LYMPHOCYTES # BLD AUTO: 1.18 K/UL (ref 1–4.8)
LYMPHOCYTES NFR BLD: 17.6 % (ref 22–41)
MCH RBC QN AUTO: 33.1 PG (ref 27–33)
MCHC RBC AUTO-ENTMCNC: 32.3 G/DL (ref 33.7–35.3)
MCV RBC AUTO: 102.4 FL (ref 81.4–97.8)
MICRO URNS: ABNORMAL
MONOCYTES # BLD AUTO: 0.79 K/UL (ref 0–0.85)
MONOCYTES NFR BLD AUTO: 11.8 % (ref 0–13.4)
NEUTROPHILS # BLD AUTO: 4.45 K/UL (ref 1.82–7.42)
NEUTROPHILS NFR BLD: 66.3 % (ref 44–72)
NITRITE UR QL STRIP.AUTO: NEGATIVE
NRBC # BLD AUTO: 0 K/UL
NRBC BLD-RTO: 0 /100 WBC
PH UR STRIP.AUTO: 5 [PH]
PLATELET # BLD AUTO: 220 K/UL (ref 164–446)
PMV BLD AUTO: 10 FL (ref 9–12.9)
POTASSIUM SERPL-SCNC: 4.5 MMOL/L (ref 3.6–5.5)
PROT SERPL-MCNC: 6.7 G/DL (ref 6–8.2)
PROT UR QL STRIP: NEGATIVE MG/DL
RBC # BLD AUTO: 4.56 M/UL (ref 4.7–6.1)
RBC # URNS HPF: ABNORMAL /HPF
RBC UR QL AUTO: NEGATIVE
SODIUM SERPL-SCNC: 139 MMOL/L (ref 135–145)
SP GR UR STRIP.AUTO: 1.02
UROBILINOGEN UR STRIP.AUTO-MCNC: 0.2 MG/DL
WBC # BLD AUTO: 6.7 K/UL (ref 4.8–10.8)
WBC #/AREA URNS HPF: ABNORMAL /HPF

## 2018-10-10 PROCEDURE — 80053 COMPREHEN METABOLIC PANEL: CPT

## 2018-10-10 PROCEDURE — 85025 COMPLETE CBC W/AUTO DIFF WBC: CPT

## 2018-10-10 PROCEDURE — 81001 URINALYSIS AUTO W/SCOPE: CPT

## 2018-10-10 PROCEDURE — 71045 X-RAY EXAM CHEST 1 VIEW: CPT

## 2018-10-10 PROCEDURE — 99284 EMERGENCY DEPT VISIT MOD MDM: CPT | Mod: 25

## 2018-10-10 PROCEDURE — 83690 ASSAY OF LIPASE: CPT

## 2018-10-10 PROCEDURE — 74176 CT ABD & PELVIS W/O CONTRAST: CPT

## 2018-10-10 RX ORDER — ONDANSETRON 2 MG/ML
4 INJECTION INTRAMUSCULAR; INTRAVENOUS ONCE
Status: DISCONTINUED | OUTPATIENT
Start: 2018-10-10 | End: 2018-10-11 | Stop reason: HOSPADM

## 2018-10-10 ASSESSMENT — PAIN DESCRIPTION - DESCRIPTORS: DESCRIPTORS: ACHING

## 2018-10-10 ASSESSMENT — PAIN SCALES - GENERAL: PAINLEVEL_OUTOF10: 3

## 2018-10-10 NOTE — ED TRIAGE NOTES
Chief Complaint   Patient presents with   • Flank Pain     left     Began this am.  Denies injury.  Denies difficult/painful urination.  Pt has a history of dementia and son with pt.  Triage process explained to patient.  Pt back to waiting room.  Pt instructed to inform RN if any changes or questions arise.

## 2018-10-11 ENCOUNTER — PATIENT OUTREACH (OUTPATIENT)
Dept: HEALTH INFORMATION MANAGEMENT | Facility: OTHER | Age: 83
End: 2018-10-11

## 2018-10-11 NOTE — ED NOTES
Pt Given discharge instructions/ home care instructions, Pt's son verbalized understanding of instructions given, pt wheeled to [facility].

## 2018-10-11 NOTE — ED NOTES
Received report from Remi Rogers. Pt care responsibilities assumed. Pt resting in bed, Monitors in place, VSS, Son at bedside. Will continue to monitor.

## 2018-10-15 ENCOUNTER — TELEPHONE (OUTPATIENT)
Dept: MEDICAL GROUP | Facility: LAB | Age: 83
End: 2018-10-15

## 2018-10-15 DIAGNOSIS — R31.9 URINARY TRACT INFECTION WITH HEMATURIA, SITE UNSPECIFIED: ICD-10-CM

## 2018-10-15 DIAGNOSIS — N39.0 URINARY TRACT INFECTION WITH HEMATURIA, SITE UNSPECIFIED: ICD-10-CM

## 2018-10-15 NOTE — TELEPHONE ENCOUNTER
1. Caller Name: Shikha Brody                                         Call Back Number: [phone]      Patient approves a detailed voicemail message: N\A    Ronn is requesting an order to collect urine to check for UTI.  He has confusion and agitation and this is very unlike him.  Fax order to [phone].

## 2018-10-15 NOTE — TELEPHONE ENCOUNTER
ESTABLISHED PATIENT PRE-VISIT PLANNING     Note: Patient will not be contacted if there is no indication to call.     1.  Reviewed notes from the last few office visits within the medical group: Yes    2.  If any orders were placed at last visit or intended to be done for this visit (i.e. 6 mos follow-up), do we have Results/Consult Notes?        •  Labs - Labs were not ordered at last office visit.       •  Imaging - Imaging was not ordered at last office visit.       •  Referrals - No referrals were ordered at last office visit.    3. Is this appointment scheduled as a Hospital Follow-Up? No    4.  Immunizations were updated in Epic using WebIZ?: Epic matches WebIZ       •  Web Iz Recommendations: FLU, TDAP and ZOSTAVAX (Shingles)    5.  Patient is due for the following Health Maintenance Topics:   Health Maintenance Due   Topic Date Due   • Annual Wellness Visit  04/30/1933   • IMM DTaP/Tdap/Td Vaccine (1 - Tdap) 04/30/1952   • COLONOSCOPY  04/30/1983   • IMM ZOSTER VACCINES (1 of 2) 04/30/1983   • IMM INFLUENZA (1) 09/01/2018       - Patient has completed PREVNAR (PCV13)  Immunization(s) per WebIZ. Chart has been updated.    6.  MDX printed for Provider? NO/MDX completed on 4/16/18    7.  Patient was NOT informed to arrive 15 min prior to their scheduled appointment and bring in their medication bottles.

## 2018-10-16 ENCOUNTER — OFFICE VISIT (OUTPATIENT)
Dept: MEDICAL GROUP | Facility: LAB | Age: 83
End: 2018-10-16
Payer: MEDICARE

## 2018-10-16 ENCOUNTER — HOSPITAL ENCOUNTER (OUTPATIENT)
Facility: MEDICAL CENTER | Age: 83
End: 2018-10-16
Attending: INTERNAL MEDICINE
Payer: MEDICARE

## 2018-10-16 VITALS
WEIGHT: 196 LBS | BODY MASS INDEX: 29.03 KG/M2 | HEART RATE: 59 BPM | TEMPERATURE: 97.6 F | DIASTOLIC BLOOD PRESSURE: 84 MMHG | RESPIRATION RATE: 18 BRPM | HEIGHT: 69 IN | OXYGEN SATURATION: 96 % | SYSTOLIC BLOOD PRESSURE: 108 MMHG

## 2018-10-16 DIAGNOSIS — R10.32 LLQ PAIN: ICD-10-CM

## 2018-10-16 DIAGNOSIS — N30.00 ACUTE CYSTITIS WITHOUT HEMATURIA: ICD-10-CM

## 2018-10-16 DIAGNOSIS — Z23 NEED FOR VACCINATION: ICD-10-CM

## 2018-10-16 DIAGNOSIS — R10.32 LEFT INGUINAL PAIN: ICD-10-CM

## 2018-10-16 LAB
APPEARANCE UR: CLEAR
BACTERIA #/AREA URNS HPF: NEGATIVE /HPF
BILIRUB UR QL STRIP.AUTO: NEGATIVE
COLOR UR: YELLOW
EPI CELLS #/AREA URNS HPF: NEGATIVE /HPF
GLUCOSE UR STRIP.AUTO-MCNC: NEGATIVE MG/DL
HYALINE CASTS #/AREA URNS LPF: ABNORMAL /LPF
KETONES UR STRIP.AUTO-MCNC: NEGATIVE MG/DL
LEUKOCYTE ESTERASE UR QL STRIP.AUTO: ABNORMAL
MICRO URNS: ABNORMAL
NITRITE UR QL STRIP.AUTO: NEGATIVE
PH UR STRIP.AUTO: 5 [PH]
PROT UR QL STRIP: NEGATIVE MG/DL
RBC # URNS HPF: ABNORMAL /HPF
RBC UR QL AUTO: NEGATIVE
SP GR UR STRIP.AUTO: 1.02
UROBILINOGEN UR STRIP.AUTO-MCNC: 0.2 MG/DL
WBC #/AREA URNS HPF: ABNORMAL /HPF

## 2018-10-16 PROCEDURE — 99214 OFFICE O/P EST MOD 30 MIN: CPT | Mod: 25 | Performed by: INTERNAL MEDICINE

## 2018-10-16 PROCEDURE — 90662 IIV NO PRSV INCREASED AG IM: CPT | Performed by: INTERNAL MEDICINE

## 2018-10-16 PROCEDURE — G0008 ADMIN INFLUENZA VIRUS VAC: HCPCS | Performed by: INTERNAL MEDICINE

## 2018-10-16 PROCEDURE — 81001 URINALYSIS AUTO W/SCOPE: CPT

## 2018-10-16 RX ORDER — AMOXICILLIN AND CLAVULANATE POTASSIUM 875; 125 MG/1; MG/1
1 TABLET, FILM COATED ORAL 2 TIMES DAILY
Qty: 20 TAB | Refills: 0 | Status: SHIPPED | OUTPATIENT
Start: 2018-10-16 | End: 2018-11-08

## 2018-10-16 NOTE — PROGRESS NOTES
CC: Joshua Nieves is a 85 y.o. male is suffering from   Chief Complaint   Patient presents with   • Dementia     6 month FV          SUBJECTIVE:  1. Need for vaccination  Joshua is here with his son Ced, is in need of a flu shot which was given.    2. Left inguinal pain  Patient was recently hospitalized because of concerns regarding abdominal pain, we have reviewed CT of the abdomen and is appropriate lab work.  I am concerned about possible inguinal hernia.    3. LLQ pain  Patient does have left lower quadrant pain to palpation consistent with possible diverticulitis    4. Acute cystitis without hematuria  Complete urinalysis ordered        Past social, family, history: , demented  Social History   Substance Use Topics   • Smoking status: Former Smoker     Quit date: 1/1/1987   • Smokeless tobacco: Never Used   • Alcohol use No         MEDICATIONS:    Current Outpatient Prescriptions:   •  amoxicillin-clavulanate (AUGMENTIN) 875-125 MG Tab, Take 1 Tab by mouth 2 times a day., Disp: 20 Tab, Rfl: 0  •  DILTIAZem CD (DILTIAZEM CD) 120 MG CAPSULE SR 24 HR, Take 1 Cap by mouth every day., Disp: 90 Cap, Rfl: 2  •  atorvastatin (LIPITOR) 40 MG Tab, Take 40 mg by mouth every evening., Disp: , Rfl:   •  quetiapine (SEROQUEL) 50 MG tablet, Take 1 Tab by mouth 2 times a day., Disp: 60 Tab, Rfl: 11  •  memantine (NAMENDA) 10 MG Tab, Take 1 Tab by mouth 2 times a day., Disp: 180 Tab, Rfl: 3  •  escitalopram (LEXAPRO) 20 MG tablet, Take 1 Tab by mouth every day., Disp: 90 Tab, Rfl: 3  •  trazodone (DESYREL) 100 MG Tab, TAKE 1 TABLET BY MOUTH AT BEDTIME, Disp: 30 Tab, Rfl: 11  •  ranolazine (RANEXA) 500 MG TABLET SR 12 HR, Take 1 Tab by mouth 2 times a day., Disp: 60 Tab, Rfl: 11  •  mupirocin (BACTROBAN) 2 % Ointment, Apply bid to wound, Disp: 30 g, Rfl: 1  •  acetaminophen (TYLENOL) 325 MG Tab, Take 2 Tabs by mouth every four hours as needed (fever/pain)., Disp: 30 Tab, Rfl: 3    PROBLEMS:  Patient Active  "Problem List    Diagnosis Date Noted   • Late onset Alzheimer's disease with behavioral disturbance 01/31/2018   • Angina at rest (HCC) 08/08/2017   • Coronary artery disease involving native coronary artery of native heart without angina pectoris 08/08/2017   • Pure hypercholesterolemia 08/08/2017   • Vitamin D deficiency disease 08/04/2017   • Abnormal CBC 08/04/2017       REVIEW OF SYSTEMS:  Gen.: None reported by son  Heart: No chest pain.  Lungs:  No shortness of Breath.  Psychological: Unable to assess     PHYSICAL EXAM   Constitutional: Alert, cooperative, not in acute distress.  Cardiovascular:  Rate Rhythm is regular without murmurs rubs clicks.     Thorax & Lungs: Clear to auscultation, no wheezing, rhonchi, or rales  HENT: Normocephalic, Atraumatic.  Eyes: PERRLA, EOMI, Conjunctiva normal.   Neck: Trachia is midline no swelling of the thyroid.   Lymphatic: No lymphadenopathy noted.   Abdomin: Soft tenderness to palpation left lower quadrant.   Neurologic: Alert & oriented x 3, cranial nerves II through XII are intact, Normal motor function, Normal sensory function, No focal deficits noted.   Psychiatric: Affect normal, Judgment normal, Mood normal.     VITAL SIGNS:/84 (BP Location: Left arm, Patient Position: Sitting)   Pulse (!) 59   Temp 36.4 °C (97.6 °F) (Temporal)   Resp 18   Ht 1.753 m (5' 9\")   Wt 88.9 kg (196 lb)   SpO2 96%   BMI 28.94 kg/m²     Labs: Reviewed    Assessment:                                                     Plan:    1. Need for vaccination  Vaccination given  - INFLUENZA VACCINE, HIGH DOSE (65+ ONLY)    2. Left inguinal pain  Ultrasound left inguinal ligament region  - US-ABDOMEN LTD (SOFT TISSUE); Future    3. LLQ pain  Start Augmentin presumptively because of possible diverticulitis  - amoxicillin-clavulanate (AUGMENTIN) 875-125 MG Tab; Take 1 Tab by mouth 2 times a day.  Dispense: 20 Tab; Refill: 0    4. Acute cystitis without hematuria  Urinalysis with culture " ordered  - URINALYSIS,CULTURE IF INDICATED; Future

## 2018-10-18 ENCOUNTER — HOSPITAL ENCOUNTER (OUTPATIENT)
Dept: RADIOLOGY | Facility: MEDICAL CENTER | Age: 83
End: 2018-10-18
Attending: INTERNAL MEDICINE
Payer: MEDICARE

## 2018-10-18 DIAGNOSIS — R10.32 LEFT INGUINAL PAIN: ICD-10-CM

## 2018-10-18 PROCEDURE — 76857 US EXAM PELVIC LIMITED: CPT

## 2018-10-24 DIAGNOSIS — F02.818 LATE ONSET ALZHEIMER'S DISEASE WITH BEHAVIORAL DISTURBANCE (HCC): ICD-10-CM

## 2018-10-24 DIAGNOSIS — G30.1 LATE ONSET ALZHEIMER'S DISEASE WITH BEHAVIORAL DISTURBANCE (HCC): ICD-10-CM

## 2018-10-24 RX ORDER — ESCITALOPRAM OXALATE 20 MG/1
TABLET ORAL
Qty: 90 TAB | Refills: 3 | Status: SHIPPED | OUTPATIENT
Start: 2018-10-24 | End: 2019-10-26

## 2018-10-24 RX ORDER — MEMANTINE HYDROCHLORIDE 10 MG/1
10 TABLET ORAL 2 TIMES DAILY
Qty: 60 TAB | Refills: 11 | Status: SHIPPED | OUTPATIENT
Start: 2018-10-24 | End: 2019-08-12 | Stop reason: SDUPTHER

## 2018-11-08 ENCOUNTER — OFFICE VISIT (OUTPATIENT)
Dept: NEUROLOGY | Facility: MEDICAL CENTER | Age: 83
End: 2018-11-08
Payer: MEDICARE

## 2018-11-08 VITALS
HEIGHT: 69 IN | BODY MASS INDEX: 29.03 KG/M2 | WEIGHT: 195.99 LBS | TEMPERATURE: 97.8 F | OXYGEN SATURATION: 98 % | RESPIRATION RATE: 16 BRPM | SYSTOLIC BLOOD PRESSURE: 120 MMHG | DIASTOLIC BLOOD PRESSURE: 72 MMHG | HEART RATE: 71 BPM

## 2018-11-08 DIAGNOSIS — G30.1 LATE ONSET ALZHEIMER'S DISEASE WITH BEHAVIORAL DISTURBANCE (HCC): ICD-10-CM

## 2018-11-08 DIAGNOSIS — F02.818 LATE ONSET ALZHEIMER'S DISEASE WITH BEHAVIORAL DISTURBANCE (HCC): ICD-10-CM

## 2018-11-08 PROCEDURE — 99214 OFFICE O/P EST MOD 30 MIN: CPT | Performed by: PSYCHIATRY & NEUROLOGY

## 2018-11-08 RX ORDER — QUETIAPINE FUMARATE 25 MG/1
75 TABLET, FILM COATED ORAL 2 TIMES DAILY
Qty: 180 TAB | Refills: 6 | Status: SHIPPED | OUTPATIENT
Start: 2018-11-08 | End: 2019-07-22 | Stop reason: SDUPTHER

## 2018-11-08 NOTE — PROGRESS NOTES
"Subjective:      Joshua Nieves is a 85 y.o. male who presents with his son Gerardo, who always is there to help with history, for follow-up, with history of moderate Alzheimer's disease.    HPI    Joshua feels that things are doing well.  He likes where he lives, he does not recognize or remember that people seem to bother him on occasion, he states that he does do most of his daily activities without issue.  He does not like to get out and exercise, he does not enjoy the activities that are provided at the care facility.  He denies daytime drowsiness.  He has been on Namenda and Seroquel 50 mg, twice daily for some time.    According to Gerardo, for the most part Joshua does well but there have been 3 instances over the last month where he had spontaneously become more agitated and upset with staff.  There was some threatening posture exhibited, the agitation could last upwards of one day, though he was still compliant enough to take medication, eat, etc.  These occurred spontaneously, did not seem to be triggered by certain circumstance or given individual.  Otherwise things seem to be quite copacetic.  He does not exercise as most of us would like, when pointed out by Gerardo, he simply states that he does not want to be controlled.  There have been no problems with hallucinations, he sleeps the night through, has not been wandering.    Medical, surgical and family histories are reviewed in the electronic health record, there are no new drug allergies.  He is on Namenda 10 mg, twice daily, Seroquel 50 mg, twice daily, trazodone 100 mg nightly, Lexapro 20 mg daily, diltiazem, Ranexa, and the rest as per the electronic health record, noncontributory from my standpoint.    Review of Systems   Unable to perform ROS: Dementia        Objective:     /72 (BP Location: Left arm, Patient Position: Sitting, BP Cuff Size: Adult)   Pulse 71   Temp 36.6 °C (97.8 °F) (Temporal)   Resp 16   Ht 1.753 m (5' 9.02\")   Wt " 88.9 kg (195 lb 15.8 oz)   SpO2 98%   BMI 28.93 kg/m²      Physical Exam    He appears in no acute distress.  Slightly disheveled in appearance only, he is cooperative.  His vital signs are stable.  There is no malar rash.  The neck is supple, range of motion is full.  Cardiac evaluation is unremarkable.    He knows he is in a doctor's office, he does not know why he is seeing me, he thinks he may have some memory problems.  He is hard of hearing, but he still answers questions appropriately.  He is a little perseverative.  He names and repeats without issue, there is no apraxia or inattention.  Frontal release signs are absent, there is no rostrocaudal extinction.    PERRLA/EOMI, visual fields are full, facial movements are symmetric, there is no bulbar dysfunction, sensory exam is intact to temperature bilaterally, the tongue and uvula are midline.  Musculoskeletal exam reveals no tremor or drift, tone is normal, strength is 5/5.  He does walk with a wheeled walker because of back pain.  There is no appendicular dystaxia with the upper extremities.  Sensory exam is intact to vibration and temperature in the upper extremities.     Assessment/Plan:     1. Late onset Alzheimer's disease with behavioral disturbance  Because the behavioral changes are somewhat unpredictable, we will probably need to increase the Seroquel a little bit more, I would recommend 75 mg, twice daily.  Hopefully there will be no daytime drowsiness, hopefully the response will be adequate enough that the agitation will be less severe and less frequent.  The other option would be to have Xanax available as necessary, the problem being would he take it when it is warranted.  I would like to be able to keep him in the assisted living facility and avoid getting him into the more restricted memory care wing of [facility].    Namenda will be continued unchanged as well the Lexapro, we can follow-up otherwise in 6 months.  Phone contact  with Gerardo moving forwards.    Time: 25 minutes spent face-to-face for exam, review, discussion, and education, of this over 60% of the time spent counseling and coordinating care with Gerardo.

## 2018-11-24 DIAGNOSIS — I20.89 ANGINA AT REST: ICD-10-CM

## 2018-11-24 DIAGNOSIS — R79.89 ABNORMAL CBC: ICD-10-CM

## 2018-11-24 DIAGNOSIS — E78.00 PURE HYPERCHOLESTEROLEMIA: ICD-10-CM

## 2018-11-24 DIAGNOSIS — I25.10 CORONARY ARTERY DISEASE INVOLVING NATIVE CORONARY ARTERY OF NATIVE HEART WITHOUT ANGINA PECTORIS: ICD-10-CM

## 2018-11-26 RX ORDER — RANOLAZINE 500 MG/1
TABLET, FILM COATED, EXTENDED RELEASE ORAL
Qty: 180 TAB | Refills: 0 | Status: SHIPPED | OUTPATIENT
Start: 2018-11-26 | End: 2019-02-22 | Stop reason: SDUPTHER

## 2018-11-27 ENCOUNTER — OFFICE VISIT (OUTPATIENT)
Dept: MEDICAL GROUP | Facility: LAB | Age: 83
End: 2018-11-27
Payer: MEDICARE

## 2018-11-27 VITALS
TEMPERATURE: 97.5 F | HEART RATE: 69 BPM | DIASTOLIC BLOOD PRESSURE: 78 MMHG | HEIGHT: 68 IN | BODY MASS INDEX: 29.86 KG/M2 | SYSTOLIC BLOOD PRESSURE: 140 MMHG | WEIGHT: 197 LBS | RESPIRATION RATE: 12 BRPM | OXYGEN SATURATION: 94 %

## 2018-11-27 DIAGNOSIS — F02.818 LATE ONSET ALZHEIMER'S DISEASE WITH BEHAVIORAL DISTURBANCE (HCC): ICD-10-CM

## 2018-11-27 DIAGNOSIS — G30.1 LATE ONSET ALZHEIMER'S DISEASE WITH BEHAVIORAL DISTURBANCE (HCC): ICD-10-CM

## 2018-11-27 PROCEDURE — 99213 OFFICE O/P EST LOW 20 MIN: CPT | Performed by: INTERNAL MEDICINE

## 2018-11-27 NOTE — PROGRESS NOTES
CC: Joshua Nieves is a 85 y.o. male is suffering from   Chief Complaint   Patient presents with   • Follow-Up     6 weeks         SUBJECTIVE:  1. Late onset Alzheimer's disease with behavioral disturbance  Joshua is here today with his son Gerardo, continues to do better.  Was found to have aggressive behavior at the nursing care facility.  Was placed on a higher dose of Seroquel by neurology.        Past social, family, history:   Social History   Substance Use Topics   • Smoking status: Former Smoker     Quit date: 1/1/1987   • Smokeless tobacco: Never Used   • Alcohol use No         MEDICATIONS:    Current Outpatient Prescriptions:   •  RANEXA 500 MG TABLET SR 12 HR, TAKE ONE TABLET BY MOUTH TWICE DAILY , Disp: 180 Tab, Rfl: 0  •  QUEtiapine (SEROQUEL) 25 MG Tab, Take 3 Tabs by mouth 2 times a day., Disp: 180 Tab, Rfl: 6  •  escitalopram (LEXAPRO) 20 MG tablet, TAKE 1 TAB BY MOUTH EVERYDAY., Disp: 90 Tab, Rfl: 3  •  memantine (NAMENDA) 10 MG Tab, TAKE 1 TAB BY MOUTH 2 TIMES A DAY., Disp: 60 Tab, Rfl: 11  •  DILTIAZem CD (DILTIAZEM CD) 120 MG CAPSULE SR 24 HR, Take 1 Cap by mouth every day., Disp: 90 Cap, Rfl: 2  •  trazodone (DESYREL) 100 MG Tab, TAKE 1 TABLET BY MOUTH AT BEDTIME, Disp: 30 Tab, Rfl: 11  •  acetaminophen (TYLENOL) 325 MG Tab, Take 2 Tabs by mouth every four hours as needed (fever/pain)., Disp: 30 Tab, Rfl: 3    PROBLEMS:  Patient Active Problem List    Diagnosis Date Noted   • Late onset Alzheimer's disease with behavioral disturbance 01/31/2018   • Angina at rest (HCC) 08/08/2017   • Coronary artery disease involving native coronary artery of native heart without angina pectoris 08/08/2017   • Pure hypercholesterolemia 08/08/2017   • Vitamin D deficiency disease 08/04/2017   • Abnormal CBC 08/04/2017       REVIEW OF SYSTEMS:  Gen.:  No Nausea, Vomiting, fever, Chills.  Heart: No chest pain.  Lungs:  No shortness of Breath.  Psychological: Anjel unusual Anxiety depression     PHYSICAL EXAM  "  Constitutional: Alert, cooperative, not in acute distress.  Cardiovascular:  Rate Rhythm is regular without murmurs rubs clicks.     Thorax & Lungs: Clear to auscultation, no wheezing, rhonchi, or rales  HENT: Normocephalic, Atraumatic.  Eyes: PERRLA, EOMI, Conjunctiva normal.   Neck: Trachia is midline no swelling of the thyroid.   Neurologic: Alert not oriented, cranial nerves II through XII are intact, Normal motor function, Normal sensory function, No focal deficits noted.   Psychiatric: Affect normal, Judgment normal, Mood normal.     VITAL SIGNS:/78   Pulse 69   Temp 36.4 °C (97.5 °F) (Temporal)   Resp 12   Ht 1.727 m (5' 8\")   Wt 89.4 kg (197 lb)   SpO2 94%   BMI 29.95 kg/m²     Labs: Reviewed    Assessment:                                                     Plan:    1. Late onset Alzheimer's disease with behavioral disturbance  Dementia, aggressive behavior, treated with higher dose of Seroquel        "

## 2018-12-20 DIAGNOSIS — G47.00 INSOMNIA, UNSPECIFIED TYPE: ICD-10-CM

## 2018-12-20 RX ORDER — TRAZODONE HYDROCHLORIDE 100 MG/1
TABLET ORAL
Qty: 30 TAB | Refills: 10 | Status: SHIPPED
Start: 2018-12-20 | End: 2019-10-26

## 2019-02-22 DIAGNOSIS — R79.89 ABNORMAL CBC: ICD-10-CM

## 2019-02-22 DIAGNOSIS — I20.89 ANGINA AT REST: ICD-10-CM

## 2019-02-22 DIAGNOSIS — I25.10 CORONARY ARTERY DISEASE INVOLVING NATIVE CORONARY ARTERY OF NATIVE HEART WITHOUT ANGINA PECTORIS: ICD-10-CM

## 2019-02-22 DIAGNOSIS — E78.00 PURE HYPERCHOLESTEROLEMIA: ICD-10-CM

## 2019-02-22 RX ORDER — RANOLAZINE 500 MG/1
TABLET, FILM COATED, EXTENDED RELEASE ORAL
Qty: 180 TAB | Refills: 3 | Status: SHIPPED | OUTPATIENT
Start: 2019-02-22 | End: 2019-10-26

## 2019-03-21 ENCOUNTER — TELEPHONE (OUTPATIENT)
Dept: MEDICAL GROUP | Facility: LAB | Age: 84
End: 2019-03-21

## 2019-03-21 ENCOUNTER — HOSPITAL ENCOUNTER (EMERGENCY)
Facility: MEDICAL CENTER | Age: 84
End: 2019-03-21
Attending: EMERGENCY MEDICINE
Payer: MEDICARE

## 2019-03-21 ENCOUNTER — APPOINTMENT (OUTPATIENT)
Dept: RADIOLOGY | Facility: MEDICAL CENTER | Age: 84
End: 2019-03-21
Attending: EMERGENCY MEDICINE
Payer: MEDICARE

## 2019-03-21 VITALS
RESPIRATION RATE: 18 BRPM | OXYGEN SATURATION: 98 % | WEIGHT: 197 LBS | BODY MASS INDEX: 29.95 KG/M2 | TEMPERATURE: 97.8 F | SYSTOLIC BLOOD PRESSURE: 139 MMHG | DIASTOLIC BLOOD PRESSURE: 97 MMHG | HEART RATE: 64 BPM

## 2019-03-21 DIAGNOSIS — S40.011A CONTUSION OF RIGHT SHOULDER, INITIAL ENCOUNTER: ICD-10-CM

## 2019-03-21 DIAGNOSIS — S70.01XA CONTUSION OF RIGHT HIP, INITIAL ENCOUNTER: ICD-10-CM
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
## 2019-03-21 PROCEDURE — 73501 X-RAY EXAM HIP UNI 1 VIEW: CPT | Mod: RT

## 2019-03-21 PROCEDURE — 99285 EMERGENCY DEPT VISIT HI MDM: CPT

## 2019-03-21 PROCEDURE — 73030 X-RAY EXAM OF SHOULDER: CPT | Mod: RT

## 2019-03-21 PROCEDURE — A9270 NON-COVERED ITEM OR SERVICE: HCPCS | Performed by: EMERGENCY MEDICINE

## 2019-03-21 PROCEDURE — 700102 HCHG RX REV CODE 250 W/ 637 OVERRIDE(OP): Performed by: EMERGENCY MEDICINE

## 2019-03-21 RX ORDER — HYDROCODONE BITARTRATE AND ACETAMINOPHEN 5; 325 MG/1; MG/1
1 TABLET ORAL EVERY 4 HOURS PRN
Qty: 12 TAB | Refills: 0 | Status: SHIPPED | OUTPATIENT
Start: 2019-03-21 | End: 2019-03-24

## 2019-03-21 RX ORDER — HYDROCODONE BITARTRATE AND ACETAMINOPHEN 5; 325 MG/1; MG/1
1 TABLET ORAL ONCE
Status: COMPLETED | OUTPATIENT
Start: 2019-03-21 | End: 2019-03-21

## 2019-03-21 RX ADMIN — HYDROCODONE BITARTRATE AND ACETAMINOPHEN 1 TABLET: 5; 325 TABLET ORAL at 17:59

## 2019-03-21 ASSESSMENT — LIFESTYLE VARIABLES: DO YOU DRINK ALCOHOL: NO

## 2019-03-21 NOTE — ED TRIAGE NOTES
Chief Complaint   Patient presents with   • Fall     GLF, tripped over feet while standing from chair.  Fell to ground.  Did not hit head, NO LOC.  Baseline dementia.     Pt has pain to right shoulder and right hip.  No obvious deformities noted.  No abrasions, discoloration, or obvious deformity.  Pt given 100 mcg IV fentanyl by EMS.

## 2019-03-21 NOTE — ED NOTES
Pt assisted with urinal.  Pt bent both knees, lifted both hips off the bed and pulled pants down using both arms.

## 2019-03-21 NOTE — ED PROVIDER NOTES
ED Provider Note    CHIEF COMPLAINT  Chief Complaint   Patient presents with   • Fall     GLF, tripped over feet while standing from chair.  Fell to ground.  Did not hit head, NO LOC.  Baseline dementia.       HPI  Joshua Nieves is a 85 y.o. male who presents for evaluation of right hip and right shoulder pain status post a ground-level fall.  Patient has dementia, he went to stand up out of a chair when he tripped and landed on his right side.  He denies a headache, there was no witness of him striking his head, no neck pain, denies back pain chest pain and abdominal pain.  He denies weakness numbness and tingling.  States his pain is isolated to the right hip and the right shoulder.  No blood thinners.  No other complaints    REVIEW OF SYSTEMS  Negative for headache, neck pain, focal weakness, focal numbness, focal tingling, chest pain, back pain, abdominal pain. All other systems are negative.     PAST MEDICAL HISTORY  Past Medical History:   Diagnosis Date   • Abnormal CBC 8/4/2017   • Late onset Alzheimer's disease with behavioral disturbance 1/31/2018   • Vitamin D deficiency disease 8/4/2017       FAMILY HISTORY  History reviewed. No pertinent family history.    SOCIAL HISTORY  Social History   Substance Use Topics   • Smoking status: Former Smoker     Quit date: 1/1/1987   • Smokeless tobacco: Never Used   • Alcohol use No       SURGICAL HISTORY  History reviewed. No pertinent surgical history.    CURRENT MEDICATIONS  I personally reviewed the medication list in the charting documentation.     ALLERGIES  Allergies   Allergen Reactions   • Spironolactone Diarrhea     Severe diarrhea       MEDICAL RECORD  I have reviewed patient's medical record and pertinent results are listed above.      PHYSICAL EXAM  VITAL SIGNS: /61   Pulse 63   Temp 36.6 °C (97.8 °F) (Temporal)   Resp 18   Wt 89.4 kg (197 lb)   SpO2 95%   BMI 29.95 kg/m²    Constitutional: Well-developed elderly male who is in no acute  distress  HENT: Mucus membranes moist.  Oropharynx is clear. Head is atraumatic.  Eyes: Pupils are equal and reactive to light. EOMI. Normal conjunctiva.    Neck: Nontender, comfortable full range of motion.  Thorax: Chest is nontender. No ecchymosis, abrasions or other traumatic injury noted.  Abdomen: Soft, with no tenderness, rebound nor guarding.  No mass or pulsatile mass appreciated. No ecchymosis, abrasions or other traumatic injury noted.  Skin: Warm, dry. No rash appreciated  Extremities/Musculoskeletal: Limited range of motion of the right shoulder and right hip secondary to pain.  He has no deformities of these regions, no ecchymosis, the skin is intact.  Tenderness involving these reasons generally even to light palpation.  Neurovascularly intact.  Neurologic: Alert. CN II-XII grossly intact. Normal and symmetric motor and sensory functions upper and lower extremities bilaterally. No focal deficits observed.     DIAGNOSTIC STUDIES / PROCEDURES    RADIOLOGY  DX-HIP-UNILATERAL-WITH PELVIS-1 VIEW RIGHT   Final Result         1.  No radiographic evidence of acute traumatic injury.      2.  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      DX-SHOULDER 2+ RIGHT   Final Result      No acute osseous abnormality.            COURSE & MEDICAL DECISION MAKING  I have reviewed any medical record information, laboratory studies and radiographic results as noted above.    Encounter Summary: This is a 85 y.o. male with right hip and right shoulder injury secondary to a ground-level fall, neurovascularly intact, denies a headache, no evidence for significant neck or back injury, notes complaints of chest pain or abdominal pain or traumatic findings there on exam, will obtain x-ray and reevaluate ------ negative x-rays.  The patient was able to stand up and bear weight and walk with minimal assistance.  I prescribed him some Norco, strict return instructions have been discussed and at this time he  is stable and appropriate for discharge    In prescribing controlled substances to this patient, I certify that I have obtained and reviewed the medical history of Joshua Nieves. I have also made a good emre effort to obtain applicable records from other providers who have treated the patient.    I have conducted a physical exam and documented it. I have reviewed Mr. Nieevs’s prescription history as maintained by the [place] Prescription Monitoring Program.     I have assessed the patient’s risk for abuse, dependency, and addiction using the validated Opioid Risk Tool    Given the above, I believe the benefits of controlled substance therapy outweigh the risks. The reasons for prescribing controlled substances include my professional opinion that controlled substances are a reasonable choice for this patient in the event other methods are ineffective inadequately controlling pain. Accordingly, I have discussed the risk and benefits, treatment plan, and alternative therapies with the patient.           DISPOSITION: Discharged home in stable condition      FINAL IMPRESSION  1. Fall, initial encounter    2. Contusion of right hip, initial encounter    3. Contusion of right shoulder, initial encounter           This dictation was created using voice recognition software. The accuracy of the dictation is limited to the abilities of the software. I expect there may be some errors of grammar and possibly content. The nursing notes were reviewed and certain aspects of this information were incorporated into this note.    Electronically signed by: Shad Avelar, 3/21/2019 4:37 PM

## 2019-03-22 NOTE — ED NOTES
Pt discharged to home.  IV removed and bandaged.  No noted swelling or redness.  Pt tolerated well.  Catheter intact.  Pt son given discharge paperwork and all applicable prescriptions written by provider.  Pt to follow with PCP, when indicated.  Pt son verbalizes understanding of discharge teaching and will return to ED if condition worsens.  Pt return verbalizes not to drink ETOH, drive, or take additional Tylenol/acetaminophen while on RX narcotic medications.

## 2019-03-28 ENCOUNTER — OFFICE VISIT (OUTPATIENT)
Dept: MEDICAL GROUP | Facility: LAB | Age: 84
End: 2019-03-28
Payer: MEDICARE

## 2019-03-28 ENCOUNTER — TELEPHONE (OUTPATIENT)
Dept: MEDICAL GROUP | Facility: LAB | Age: 84
End: 2019-03-28

## 2019-03-28 VITALS
DIASTOLIC BLOOD PRESSURE: 64 MMHG | TEMPERATURE: 97 F | OXYGEN SATURATION: 90 % | HEART RATE: 61 BPM | SYSTOLIC BLOOD PRESSURE: 110 MMHG

## 2019-03-28 DIAGNOSIS — M25.511 ACUTE PAIN OF RIGHT SHOULDER: ICD-10-CM

## 2019-03-28 DIAGNOSIS — G30.1 LATE ONSET ALZHEIMER'S DISEASE WITH BEHAVIORAL DISTURBANCE (HCC): ICD-10-CM

## 2019-03-28 DIAGNOSIS — R53.81 DEBILITY: ICD-10-CM

## 2019-03-28 DIAGNOSIS — F02.818 LATE ONSET ALZHEIMER'S DISEASE WITH BEHAVIORAL DISTURBANCE (HCC): ICD-10-CM

## 2019-03-28 DIAGNOSIS — S70.01XA CONTUSION OF RIGHT HIP, INITIAL ENCOUNTER: ICD-10-CM

## 2019-03-28 DIAGNOSIS — I20.89 ANGINA AT REST: ICD-10-CM

## 2019-03-28 PROCEDURE — 99214 OFFICE O/P EST MOD 30 MIN: CPT | Mod: 25 | Performed by: INTERNAL MEDICINE

## 2019-03-28 PROCEDURE — 8041 PR SCP AHA: Performed by: INTERNAL MEDICINE

## 2019-03-28 RX ORDER — ACETAMINOPHEN 500 MG
500 TABLET ORAL EVERY 6 HOURS PRN
Qty: 30 TAB | Refills: 6 | Status: SHIPPED | OUTPATIENT
Start: 2019-03-28 | End: 2019-04-08

## 2019-03-28 RX ORDER — ACETAMINOPHEN 500 MG
500 TABLET ORAL EVERY 6 HOURS PRN
Qty: 30 TAB | Refills: 0 | Status: SHIPPED | OUTPATIENT
Start: 2019-03-28 | End: 2019-03-28

## 2019-03-28 NOTE — TELEPHONE ENCOUNTER
Son called and is requesting a letter/note faxed to [facility] stating pt's medication acetaminophen (TYLENOL) has been changed from 325 mg to 500 mg   This is needed for records    [phone]

## 2019-03-28 NOTE — PROGRESS NOTES
Subjective:     Joshua Nieves is a 85 y.o. male here today for right shoulder right hip pain after fall and Annual Health Assessment.    Patient is having problems with pain at the right shoulder also right hip.  X-rays are reviewed patient is to have repeat x-rays in 2 weeks if not improving discussed with Ced that this may show an occult fracture    Health Maintenance Summary                Annual Wellness Visit Overdue 4/30/1933     IMM DTaP/Tdap/Td Vaccine Overdue 4/30/1952     COLONOSCOPY Overdue 4/30/1983     IMM ZOSTER VACCINES Overdue 4/30/1983     IMM PNEUMOCOCCAL 65+ (ADULT) LOW/MEDIUM RISK SERIES Overdue 11/3/2018      Done 11/3/2017 Imm Admin: Pneumococcal Conjugate Vaccine (Prevnar/PCV-13)            Annual Health Assessment Questions:      1.  Are you currently engaging in any exercise or physical activity? No    2.  How would you describe your mood or emotional well-being today? pt unable to answer    3.  Have you had any falls in the last year? Yes    4.  Have you noticed any problems with your balance or had difficulty walking? Yes    5.  In the last six months have you experienced any leakage of urine? Pt unable to answer    6. DPA/Advanced Directive: Patient has Advanced Directive on file.     Current medicines (including changes today)  Current Outpatient Prescriptions   Medication Sig Dispense Refill   • RANEXA 500 MG TABLET SR 12 HR TAKE 1 TABLET BY MOUTH TWICE DAILY 180 Tab 3   • traZODone (DESYREL) 100 MG Tab TAKE ONE TABLET BY MOUTH AT BEDTIME  30 Tab 10   • QUEtiapine (SEROQUEL) 25 MG Tab Take 3 Tabs by mouth 2 times a day. 180 Tab 6   • escitalopram (LEXAPRO) 20 MG tablet TAKE 1 TAB BY MOUTH EVERYDAY. 90 Tab 3   • memantine (NAMENDA) 10 MG Tab TAKE 1 TAB BY MOUTH 2 TIMES A DAY. 60 Tab 11   • DILTIAZem CD (DILTIAZEM CD) 120 MG CAPSULE SR 24 HR Take 1 Cap by mouth every day. 90 Cap 2   • acetaminophen (TYLENOL) 325 MG Tab Take 2 Tabs by mouth every four hours as needed  (fever/pain). 30 Tab 3     No current facility-administered medications for this visit.        He  has a past medical history of Abnormal CBC (8/4/2017); Late onset Alzheimer's disease with behavioral disturbance (1/31/2018); and Vitamin D deficiency disease (8/4/2017).    Spironolactone    He  reports that he quit smoking about 32 years ago. He has never used smokeless tobacco. He reports that he does not drink alcohol or use drugs.  Counseling given: Not Answered      ROS    No chest pain, no shortness of breath, no abdominal pain.     Objective:     Physical Exam:  Blood pressure 110/64, pulse 61, temperature 36.1 °C (97 °F), temperature source Temporal, SpO2 90 %. There is no height or weight on file to calculate BMI.    Constitutional: Alert, no distress.  Skin: Warm, dry, good turgor, no rashes in visible areas.  Eye: Equal, round and reactive, conjunctiva clear, lids normal.  ENMT: Lips without lesions, good dentition, oropharynx clear.  Psych: Alert not oriented..    Assessment and Plan:     1. Contusion of right hip, initial encounter  Contusion right hip patient is to use Tylenol  - DX-HIP-COMPLETE - UNILATERAL 2+ RIGHT; Future  - Misc. Devices Misc; Wheel chair use as needed.  Dispense: 1 Each; Refill: 0  - acetaminophen (TYLENOL) 500 MG Tab; Take 1 Tab by mouth every 6 hours as needed.  Dispense: 30 Tab; Refill: 6    2. Late onset Alzheimer's disease with behavioral disturbance  Likely huge list of dementia    3. Angina at rest (HCC)  Continue current medical therapy    4. Acute pain of right shoulder  X-ray right shoulder if continued pain  - DX-SHOULDER 2+ RIGHT; Future    5. Debility  Patient is in need of a wheelchair  - Misc. Devices Misc; Wheel chair use as needed.  Dispense: 1 Each; Refill: 0    Discussion today about general wellness and lifestyle habits:    · Engage in regular physical activity and social activities.  · Prevent falls and reduce trip hazards; using ambulatory aides, hearing and  vision testing if appropriate.  · Steps to improve urinary incontinence.  · Advanced care planning.    Follow-Up: No Follow-up on file.         PLEASE NOTE: This dictation was created using voice recognition software. I have made every reasonable attempt to correct obvious errors, but I expect that there are errors of grammar and possibly content that I did not discover before finalizing the note.    CC: Joshua Nieves is a 85 y.o. male is suffering from   Chief Complaint   Patient presents with   • Hospital Follow-up     recent fall, discuss need for wheel chair         SUBJECTIVE:  1. Contusion of right hip, initial encounter  Joshua is accompanied by his son.  Son is told me that father was seen in the emergency room, notes reviewed.  Patient apparently fell hitting his right hip and his right shoulder.  Patient continues to have problems with pain.  But with pain medication patient has problems with worsening dementia    2. 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 Angina at rest (HCC)  Ongoing    4. Acute pain of right shoulder  X-rays to be repeated at 2 weeks    5. Debility  Patient is in need of a wheelchair unable to walk        Past social, family, history:   Social History   Substance Use Topics   • Smoking status: Former Smoker     Quit date: 1/1/1987   • Smokeless tobacco: Never Used   • Alcohol use No         MEDICATIONS:    Current Outpatient Prescriptions:   •  Misc. Devices Misc, Wheel chair use as needed., Disp: 1 Each, Rfl: 0  •  acetaminophen (TYLENOL) 500 MG Tab, Take 1 Tab by mouth every 6 hours as needed., Disp: 30 Tab, Rfl: 6  •  RANEXA 500 MG TABLET SR 12 HR, TAKE 1 TABLET BY MOUTH TWICE DAILY, Disp: 180 Tab, Rfl: 3  •  traZODone (DESYREL) 100 MG Tab, TAKE ONE TABLET BY MOUTH AT BEDTIME , Disp: 30 Tab, Rfl: 10  •  QUEtiapine (SEROQUEL) 25 MG Tab, Take 3 Tabs by mouth 2 times a  day., Disp: 180 Tab, Rfl: 6  •  escitalopram (LEXAPRO) 20 MG tablet, TAKE 1 TAB BY MOUTH EVERYDAY., Disp: 90 Tab, Rfl: 3  •  memantine (NAMENDA) 10 MG Tab, TAKE 1 TAB BY MOUTH 2 TIMES A DAY., Disp: 60 Tab, Rfl: 11  •  DILTIAZem CD (DILTIAZEM CD) 120 MG CAPSULE SR 24 HR, Take 1 Cap by mouth every day., Disp: 90 Cap, Rfl: 2    PROBLEMS:  Patient Active Problem List    Diagnosis Date Noted   • Late onset Alzheimer's disease with behavioral disturbance 01/31/2018   • Angina at rest (HCC) 08/08/2017   • Coronary artery disease involving native coronary artery of native heart without angina pectoris 08/08/2017   • Pure hypercholesterolemia 08/08/2017   • Vitamin D deficiency disease 08/04/2017   • Abnormal CBC 08/04/2017       REVIEW OF SYSTEMS:  Gen.:  No Nausea, Vomiting, fever, Chills.  Heart: No chest pain.  Lungs:  No shortness of Breath.  Psychological: Anjel unusual Anxiety depression     PHYSICAL EXAM   Constitutional: Alert, cooperative, not in acute distress.  Cardiovascular:  Rate Rhythm is regular without murmurs rubs clicks.     Thorax & Lungs: Clear to auscultation, no wheezing, rhonchi, or rales  HENT: Normocephalic, Atraumatic.  Eyes: PERRLA, EOMI, Conjunctiva normal.   Neck: Trachia is midline no swelling of the thyroid.   Lymphatic: No lymphadenopathy noted.   Musculoskeletal: Pain with groaning associate with right shoulder unable to lift with the right leg  Neurologic: Alert but not oriented cranial nerves II through XII are intact, abnormal motor function right arm right leg, unable to assess sensory function.  Psychiatric: Affect normal, Judgment normal, Mood normal.     VITAL SIGNS:/64   Pulse 61   Temp 36.1 °C (97 °F) (Temporal)   SpO2 90%     Labs: Reviewed    Assessment:                                                     Plan:    1. Contusion of right hip, initial encounter  X-ray to be repeated after 2 weeks  - DX-HIP-COMPLETE - UNILATERAL 2+ RIGHT; Future  - Misc. Devices Misc; Wheel  chair use as needed.  Dispense: 1 Each; Refill: 0  - acetaminophen (TYLENOL) 500 MG Tab; Take 1 Tab by mouth every 6 hours as needed.  Dispense: 30 Tab; Refill: 6    2. Late onset Alzheimer's disease with behavioral disturbance  Likely dementia.  Pugilistica    3. Angina at rest (HCC)  Controlled, unable to assess    4. Acute pain of right shoulder  Ongoing repeat x-ray 2 weeks  - DX-SHOULDER 2+ RIGHT; Future    5. Debility  Significant debility with weakness  - Misc. Devices Misc; Wheel chair use as needed.  Dispense: 1 Each; Refill: 0

## 2019-03-29 ENCOUNTER — TELEPHONE (OUTPATIENT)
Dept: MEDICAL GROUP | Facility: LAB | Age: 84
End: 2019-03-29

## 2019-03-29 DIAGNOSIS — R10.9 ABDOMINAL PAIN, UNSPECIFIED ABDOMINAL LOCATION: ICD-10-CM

## 2019-03-29 DIAGNOSIS — S49.91XA INJURY OF RIGHT SHOULDER, INITIAL ENCOUNTER: ICD-10-CM

## 2019-03-29 DIAGNOSIS — M25.551 RIGHT HIP PAIN: ICD-10-CM

## 2019-03-29 RX ORDER — TRAMADOL HYDROCHLORIDE 50 MG/1
50 TABLET ORAL EVERY 8 HOURS PRN
Qty: 30 TAB | Refills: 0 | Status: SHIPPED
Start: 2019-03-29 | End: 2019-04-08

## 2019-03-29 RX ORDER — TRAMADOL HYDROCHLORIDE 50 MG/1
50 TABLET ORAL EVERY 8 HOURS PRN
Qty: 30 TAB | Refills: 0 | Status: SHIPPED | OUTPATIENT
Start: 2019-03-29 | End: 2019-03-29 | Stop reason: SDUPTHER

## 2019-03-29 NOTE — TELEPHONE ENCOUNTER
Ana:  Please call [facility] a prescription has been written for tramadol, fax it to their office!  Regards, Sourav Stevenson, DO

## 2019-03-30 NOTE — TELEPHONE ENCOUNTER
Marnie at [facility] notified.  She is asking though if they could check a urine on him.

## 2019-04-07 ENCOUNTER — HOSPITAL ENCOUNTER (OUTPATIENT)
Facility: MEDICAL CENTER | Age: 84
End: 2019-04-07
Attending: INTERNAL MEDICINE
Payer: MEDICARE

## 2019-04-07 PROCEDURE — 81001 URINALYSIS AUTO W/SCOPE: CPT

## 2019-04-08 ENCOUNTER — APPOINTMENT (OUTPATIENT)
Dept: RADIOLOGY | Facility: MEDICAL CENTER | Age: 84
DRG: 481 | End: 2019-04-08
Attending: EMERGENCY MEDICINE
Payer: MEDICARE

## 2019-04-08 ENCOUNTER — HOSPITAL ENCOUNTER (INPATIENT)
Facility: MEDICAL CENTER | Age: 84
LOS: 8 days | DRG: 481 | End: 2019-04-16
Attending: EMERGENCY MEDICINE | Admitting: INTERNAL MEDICINE
Payer: MEDICARE

## 2019-04-08 DIAGNOSIS — F03.91 DEMENTIA WITH BEHAVIORAL DISTURBANCE, UNSPECIFIED DEMENTIA TYPE: ICD-10-CM

## 2019-04-08 DIAGNOSIS — R10.9 ABDOMINAL PAIN, UNSPECIFIED ABDOMINAL LOCATION: ICD-10-CM

## 2019-04-08 DIAGNOSIS — S72.001A CLOSED FRACTURE OF NECK OF RIGHT FEMUR, INITIAL ENCOUNTER (HCC): ICD-10-CM

## 2019-04-08 LAB
ALBUMIN SERPL BCP-MCNC: 3.6 G/DL (ref 3.2–4.9)
ALBUMIN/GLOB SERPL: 1.1 G/DL
ALP SERPL-CCNC: 80 U/L (ref 30–99)
ALT SERPL-CCNC: 13 U/L (ref 2–50)
ANION GAP SERPL CALC-SCNC: 9 MMOL/L (ref 0–11.9)
APPEARANCE UR: ABNORMAL
APTT PPP: 27.2 SEC (ref 24.7–36)
AST SERPL-CCNC: 24 U/L (ref 12–45)
BACTERIA #/AREA URNS HPF: ABNORMAL /HPF
BASOPHILS # BLD AUTO: 0.8 % (ref 0–1.8)
BASOPHILS # BLD: 0.08 K/UL (ref 0–0.12)
BILIRUB SERPL-MCNC: 0.6 MG/DL (ref 0.1–1.5)
BILIRUB UR QL STRIP.AUTO: ABNORMAL
BUN SERPL-MCNC: 13 MG/DL (ref 8–22)
CALCIUM SERPL-MCNC: 8.8 MG/DL (ref 8.5–10.5)
CAOX CRY #/AREA URNS HPF: ABNORMAL /HPF
CHLORIDE SERPL-SCNC: 103 MMOL/L (ref 96–112)
CO2 SERPL-SCNC: 23 MMOL/L (ref 20–33)
COLOR UR: ABNORMAL
CREAT SERPL-MCNC: 1.36 MG/DL (ref 0.5–1.4)
EKG IMPRESSION: NORMAL
EOSINOPHIL # BLD AUTO: 0.25 K/UL (ref 0–0.51)
EOSINOPHIL NFR BLD: 2.4 % (ref 0–6.9)
EPI CELLS #/AREA URNS HPF: ABNORMAL /HPF
ERYTHROCYTE [DISTWIDTH] IN BLOOD BY AUTOMATED COUNT: 47.3 FL (ref 35.9–50)
GLOBULIN SER CALC-MCNC: 3.2 G/DL (ref 1.9–3.5)
GLUCOSE SERPL-MCNC: 92 MG/DL (ref 65–99)
GLUCOSE UR STRIP.AUTO-MCNC: NEGATIVE MG/DL
HCT VFR BLD AUTO: 42.1 % (ref 42–52)
HGB BLD-MCNC: 14.6 G/DL (ref 14–18)
IMM GRANULOCYTES # BLD AUTO: 0.04 K/UL (ref 0–0.11)
IMM GRANULOCYTES NFR BLD AUTO: 0.4 % (ref 0–0.9)
INR PPP: 1.15 (ref 0.87–1.13)
KETONES UR STRIP.AUTO-MCNC: 15 MG/DL
LEUKOCYTE ESTERASE UR QL STRIP.AUTO: NEGATIVE
LYMPHOCYTES # BLD AUTO: 1.34 K/UL (ref 1–4.8)
LYMPHOCYTES NFR BLD: 13.1 % (ref 22–41)
MCH RBC QN AUTO: 33.4 PG (ref 27–33)
MCHC RBC AUTO-ENTMCNC: 34.7 G/DL (ref 33.7–35.3)
MCV RBC AUTO: 96.3 FL (ref 81.4–97.8)
MICRO URNS: ABNORMAL
MONOCYTES # BLD AUTO: 0.98 K/UL (ref 0–0.85)
MONOCYTES NFR BLD AUTO: 9.6 % (ref 0–13.4)
NEUTROPHILS # BLD AUTO: 7.56 K/UL (ref 1.82–7.42)
NEUTROPHILS NFR BLD: 73.7 % (ref 44–72)
NITRITE UR QL STRIP.AUTO: NEGATIVE
NRBC # BLD AUTO: 0 K/UL
NRBC BLD-RTO: 0 /100 WBC
PH UR STRIP.AUTO: 6 [PH]
PLATELET # BLD AUTO: 355 K/UL (ref 164–446)
PMV BLD AUTO: 8.9 FL (ref 9–12.9)
POTASSIUM SERPL-SCNC: 3.9 MMOL/L (ref 3.6–5.5)
PROT SERPL-MCNC: 6.8 G/DL (ref 6–8.2)
PROT UR QL STRIP: NEGATIVE MG/DL
PROTHROMBIN TIME: 14.8 SEC (ref 12–14.6)
RBC # BLD AUTO: 4.37 M/UL (ref 4.7–6.1)
RBC UR QL AUTO: NEGATIVE
SODIUM SERPL-SCNC: 135 MMOL/L (ref 135–145)
SP GR UR STRIP.AUTO: 1.03
TROPONIN I SERPL-MCNC: 0.02 NG/ML (ref 0–0.04)
UROBILINOGEN UR STRIP.AUTO-MCNC: 2 MG/DL
WBC # BLD AUTO: 10.3 K/UL (ref 4.8–10.8)
WBC #/AREA URNS HPF: ABNORMAL /HPF

## 2019-04-08 PROCEDURE — 99285 EMERGENCY DEPT VISIT HI MDM: CPT

## 2019-04-08 PROCEDURE — 85025 COMPLETE CBC W/AUTO DIFF WBC: CPT

## 2019-04-08 PROCEDURE — 85610 PROTHROMBIN TIME: CPT

## 2019-04-08 PROCEDURE — 84484 ASSAY OF TROPONIN QUANT: CPT

## 2019-04-08 PROCEDURE — 73700 CT LOWER EXTREMITY W/O DYE: CPT | Mod: RT

## 2019-04-08 PROCEDURE — 93005 ELECTROCARDIOGRAM TRACING: CPT | Performed by: INTERNAL MEDICINE

## 2019-04-08 PROCEDURE — 99223 1ST HOSP IP/OBS HIGH 75: CPT | Performed by: INTERNAL MEDICINE

## 2019-04-08 PROCEDURE — 770001 HCHG ROOM/CARE - MED/SURG/GYN PRIV*

## 2019-04-08 PROCEDURE — 73552 X-RAY EXAM OF FEMUR 2/>: CPT | Mod: RT

## 2019-04-08 PROCEDURE — 82306 VITAMIN D 25 HYDROXY: CPT

## 2019-04-08 PROCEDURE — 85730 THROMBOPLASTIN TIME PARTIAL: CPT

## 2019-04-08 PROCEDURE — 71045 X-RAY EXAM CHEST 1 VIEW: CPT

## 2019-04-08 PROCEDURE — 73030 X-RAY EXAM OF SHOULDER: CPT | Mod: RT

## 2019-04-08 PROCEDURE — 80053 COMPREHEN METABOLIC PANEL: CPT

## 2019-04-08 RX ORDER — ONDANSETRON 4 MG/1
4 TABLET, ORALLY DISINTEGRATING ORAL EVERY 4 HOURS PRN
Status: DISCONTINUED | OUTPATIENT
Start: 2019-04-08 | End: 2019-04-16 | Stop reason: HOSPADM

## 2019-04-08 RX ORDER — RANOLAZINE 500 MG/1
500 TABLET, EXTENDED RELEASE ORAL 2 TIMES DAILY
Status: DISCONTINUED | OUTPATIENT
Start: 2019-04-08 | End: 2019-04-16 | Stop reason: HOSPADM

## 2019-04-08 RX ORDER — ACETAMINOPHEN 325 MG/1
650 TABLET ORAL EVERY 6 HOURS PRN
Status: DISCONTINUED | OUTPATIENT
Start: 2019-04-08 | End: 2019-04-16 | Stop reason: HOSPADM

## 2019-04-08 RX ORDER — QUETIAPINE FUMARATE 25 MG/1
75 TABLET, FILM COATED ORAL 2 TIMES DAILY
Status: DISCONTINUED | OUTPATIENT
Start: 2019-04-08 | End: 2019-04-16 | Stop reason: HOSPADM

## 2019-04-08 RX ORDER — DILTIAZEM HYDROCHLORIDE 120 MG/1
120 CAPSULE, COATED, EXTENDED RELEASE ORAL DAILY
Status: DISCONTINUED | OUTPATIENT
Start: 2019-04-09 | End: 2019-04-16 | Stop reason: HOSPADM

## 2019-04-08 RX ORDER — OXYCODONE HYDROCHLORIDE 10 MG/1
10 TABLET ORAL
Status: DISCONTINUED | OUTPATIENT
Start: 2019-04-08 | End: 2019-04-16 | Stop reason: HOSPADM

## 2019-04-08 RX ORDER — ONDANSETRON 2 MG/ML
4 INJECTION INTRAMUSCULAR; INTRAVENOUS EVERY 4 HOURS PRN
Status: DISCONTINUED | OUTPATIENT
Start: 2019-04-08 | End: 2019-04-16 | Stop reason: HOSPADM

## 2019-04-08 RX ORDER — TRAZODONE HYDROCHLORIDE 100 MG/1
100 TABLET ORAL
Status: DISCONTINUED | OUTPATIENT
Start: 2019-04-08 | End: 2019-04-16 | Stop reason: HOSPADM

## 2019-04-08 RX ORDER — ESCITALOPRAM OXALATE 10 MG/1
20 TABLET ORAL DAILY
Status: DISCONTINUED | OUTPATIENT
Start: 2019-04-09 | End: 2019-04-16 | Stop reason: HOSPADM

## 2019-04-08 RX ORDER — SODIUM CHLORIDE 9 MG/ML
INJECTION, SOLUTION INTRAVENOUS CONTINUOUS
Status: DISCONTINUED | OUTPATIENT
Start: 2019-04-08 | End: 2019-04-12

## 2019-04-08 RX ORDER — BISACODYL 10 MG
10 SUPPOSITORY, RECTAL RECTAL
Status: DISCONTINUED | OUTPATIENT
Start: 2019-04-08 | End: 2019-04-16 | Stop reason: HOSPADM

## 2019-04-08 RX ORDER — MEMANTINE HYDROCHLORIDE 10 MG/1
10 TABLET ORAL 2 TIMES DAILY
Status: DISCONTINUED | OUTPATIENT
Start: 2019-04-08 | End: 2019-04-16 | Stop reason: HOSPADM

## 2019-04-08 RX ORDER — OXYCODONE HYDROCHLORIDE 5 MG/1
5 TABLET ORAL
Status: DISCONTINUED | OUTPATIENT
Start: 2019-04-08 | End: 2019-04-16 | Stop reason: HOSPADM

## 2019-04-08 RX ORDER — AMOXICILLIN 250 MG
2 CAPSULE ORAL 2 TIMES DAILY
Status: DISCONTINUED | OUTPATIENT
Start: 2019-04-08 | End: 2019-04-16 | Stop reason: HOSPADM

## 2019-04-08 RX ORDER — HYDRALAZINE HYDROCHLORIDE 20 MG/ML
10 INJECTION INTRAMUSCULAR; INTRAVENOUS EVERY 4 HOURS PRN
Status: DISCONTINUED | OUTPATIENT
Start: 2019-04-08 | End: 2019-04-16 | Stop reason: HOSPADM

## 2019-04-08 RX ORDER — POLYETHYLENE GLYCOL 3350 17 G/17G
1 POWDER, FOR SOLUTION ORAL
Status: DISCONTINUED | OUTPATIENT
Start: 2019-04-08 | End: 2019-04-16 | Stop reason: HOSPADM

## 2019-04-08 RX ORDER — MORPHINE SULFATE 4 MG/ML
4 INJECTION, SOLUTION INTRAMUSCULAR; INTRAVENOUS
Status: DISCONTINUED | OUTPATIENT
Start: 2019-04-08 | End: 2019-04-16 | Stop reason: HOSPADM

## 2019-04-09 ENCOUNTER — APPOINTMENT (OUTPATIENT)
Dept: RADIOLOGY | Facility: MEDICAL CENTER | Age: 84
DRG: 481 | End: 2019-04-09
Attending: ORTHOPAEDIC SURGERY
Payer: MEDICARE

## 2019-04-09 ENCOUNTER — ANESTHESIA EVENT (OUTPATIENT)
Dept: SURGERY | Facility: MEDICAL CENTER | Age: 84
DRG: 481 | End: 2019-04-09
Payer: MEDICARE

## 2019-04-09 ENCOUNTER — ANESTHESIA (OUTPATIENT)
Dept: SURGERY | Facility: MEDICAL CENTER | Age: 84
DRG: 481 | End: 2019-04-09
Payer: MEDICARE

## 2019-04-09 PROBLEM — N30.00 ACUTE CYSTITIS WITHOUT HEMATURIA: Status: ACTIVE | Noted: 2019-04-09

## 2019-04-09 PROBLEM — S72.001A CLOSED FRACTURE OF NECK OF RIGHT FEMUR (HCC): Status: ACTIVE | Noted: 2019-04-09

## 2019-04-09 LAB — 25(OH)D3 SERPL-MCNC: 8 NG/ML (ref 30–100)

## 2019-04-09 PROCEDURE — 160009 HCHG ANES TIME/MIN: Performed by: ORTHOPAEDIC SURGERY

## 2019-04-09 PROCEDURE — 700111 HCHG RX REV CODE 636 W/ 250 OVERRIDE (IP)

## 2019-04-09 PROCEDURE — 700105 HCHG RX REV CODE 258: Performed by: INTERNAL MEDICINE

## 2019-04-09 PROCEDURE — 700101 HCHG RX REV CODE 250: Performed by: ANESTHESIOLOGY

## 2019-04-09 PROCEDURE — 160029 HCHG SURGERY MINUTES - 1ST 30 MINS LEVEL 4: Performed by: ORTHOPAEDIC SURGERY

## 2019-04-09 PROCEDURE — 700111 HCHG RX REV CODE 636 W/ 250 OVERRIDE (IP): Performed by: ANESTHESIOLOGY

## 2019-04-09 PROCEDURE — 160048 HCHG OR STATISTICAL LEVEL 1-5: Performed by: ORTHOPAEDIC SURGERY

## 2019-04-09 PROCEDURE — 500891 HCHG PACK, ORTHO MAJOR: Performed by: ORTHOPAEDIC SURGERY

## 2019-04-09 PROCEDURE — 770001 HCHG ROOM/CARE - MED/SURG/GYN PRIV*

## 2019-04-09 PROCEDURE — 700101 HCHG RX REV CODE 250

## 2019-04-09 PROCEDURE — C1713 ANCHOR/SCREW BN/BN,TIS/BN: HCPCS | Performed by: ORTHOPAEDIC SURGERY

## 2019-04-09 PROCEDURE — 160036 HCHG PACU - EA ADDL 30 MINS PHASE I: Performed by: ORTHOPAEDIC SURGERY

## 2019-04-09 PROCEDURE — 160035 HCHG PACU - 1ST 60 MINS PHASE I: Performed by: ORTHOPAEDIC SURGERY

## 2019-04-09 PROCEDURE — 94760 N-INVAS EAR/PLS OXIMETRY 1: CPT

## 2019-04-09 PROCEDURE — 160002 HCHG RECOVERY MINUTES (STAT): Performed by: ORTHOPAEDIC SURGERY

## 2019-04-09 PROCEDURE — 700111 HCHG RX REV CODE 636 W/ 250 OVERRIDE (IP): Performed by: INTERNAL MEDICINE

## 2019-04-09 PROCEDURE — 160041 HCHG SURGERY MINUTES - EA ADDL 1 MIN LEVEL 4: Performed by: ORTHOPAEDIC SURGERY

## 2019-04-09 PROCEDURE — 700102 HCHG RX REV CODE 250 W/ 637 OVERRIDE(OP): Performed by: INTERNAL MEDICINE

## 2019-04-09 PROCEDURE — 73501 X-RAY EXAM HIP UNI 1 VIEW: CPT | Mod: RT

## 2019-04-09 PROCEDURE — A9270 NON-COVERED ITEM OR SERVICE: HCPCS | Performed by: INTERNAL MEDICINE

## 2019-04-09 PROCEDURE — 700105 HCHG RX REV CODE 258: Performed by: ANESTHESIOLOGY

## 2019-04-09 PROCEDURE — 0QS634Z REPOSITION RIGHT UPPER FEMUR WITH INTERNAL FIXATION DEVICE, PERCUTANEOUS APPROACH: ICD-10-PCS | Performed by: ORTHOPAEDIC SURGERY

## 2019-04-09 PROCEDURE — 99233 SBSQ HOSP IP/OBS HIGH 50: CPT | Performed by: INTERNAL MEDICINE

## 2019-04-09 PROCEDURE — 700105 HCHG RX REV CODE 258

## 2019-04-09 PROCEDURE — 501838 HCHG SUTURE GENERAL: Performed by: ORTHOPAEDIC SURGERY

## 2019-04-09 PROCEDURE — A6402 STERILE GAUZE <= 16 SQ IN: HCPCS | Performed by: ORTHOPAEDIC SURGERY

## 2019-04-09 DEVICE — SCREW CANNULATED 32MM THREAD 7.3MM X 90MM (3TX3=9): Type: IMPLANTABLE DEVICE | Site: HIP | Status: FUNCTIONAL

## 2019-04-09 DEVICE — SCREW CANNULATED 32MM THREAD 7.3MM X 105MM (3TX3=9): Type: IMPLANTABLE DEVICE | Site: HIP | Status: FUNCTIONAL

## 2019-04-09 DEVICE — SCREW CANNULATED 32MM THREAD 7.3MM X 95MM (3TX3=9): Type: IMPLANTABLE DEVICE | Site: HIP | Status: FUNCTIONAL

## 2019-04-09 RX ORDER — DEXAMETHASONE SODIUM PHOSPHATE 4 MG/ML
INJECTION, SOLUTION INTRA-ARTICULAR; INTRALESIONAL; INTRAMUSCULAR; INTRAVENOUS; SOFT TISSUE PRN
Status: DISCONTINUED | OUTPATIENT
Start: 2019-04-09 | End: 2019-04-09 | Stop reason: SURG

## 2019-04-09 RX ORDER — CALCIUM CHLORIDE 100 MG/ML
INJECTION INTRAVENOUS; INTRAVENTRICULAR PRN
Status: DISCONTINUED | OUTPATIENT
Start: 2019-04-09 | End: 2019-04-09 | Stop reason: SURG

## 2019-04-09 RX ORDER — PHENYLEPHRINE HYDROCHLORIDE 10 MG/ML
INJECTION, SOLUTION INTRAMUSCULAR; INTRAVENOUS; SUBCUTANEOUS PRN
Status: DISCONTINUED | OUTPATIENT
Start: 2019-04-09 | End: 2019-04-09 | Stop reason: SURG

## 2019-04-09 RX ORDER — CEFAZOLIN SODIUM 1 G/3ML
INJECTION, POWDER, FOR SOLUTION INTRAMUSCULAR; INTRAVENOUS PRN
Status: DISCONTINUED | OUTPATIENT
Start: 2019-04-09 | End: 2019-04-09 | Stop reason: SURG

## 2019-04-09 RX ORDER — SODIUM CHLORIDE, SODIUM GLUCONATE, SODIUM ACETATE, POTASSIUM CHLORIDE AND MAGNESIUM CHLORIDE 526; 502; 368; 37; 30 MG/100ML; MG/100ML; MG/100ML; MG/100ML; MG/100ML
INJECTION, SOLUTION INTRAVENOUS
Status: DISCONTINUED | OUTPATIENT
Start: 2019-04-09 | End: 2019-04-09 | Stop reason: SURG

## 2019-04-09 RX ADMIN — CEFTRIAXONE SODIUM 2 G: 2 INJECTION, POWDER, FOR SOLUTION INTRAMUSCULAR; INTRAVENOUS at 05:46

## 2019-04-09 RX ADMIN — QUETIAPINE FUMARATE 75 MG: 25 TABLET ORAL at 08:36

## 2019-04-09 RX ADMIN — PROPOFOL 140 MG: 10 INJECTION, EMULSION INTRAVENOUS at 21:16

## 2019-04-09 RX ADMIN — CALCIUM CHLORIDE 1 G: 100 INJECTION, SOLUTION INTRAVENOUS at 21:21

## 2019-04-09 RX ADMIN — SODIUM CHLORIDE: 9 INJECTION, SOLUTION INTRAVENOUS at 01:04

## 2019-04-09 RX ADMIN — RANOLAZINE 500 MG: 500 TABLET, FILM COATED, EXTENDED RELEASE ORAL at 00:49

## 2019-04-09 RX ADMIN — SODIUM CHLORIDE: 9 INJECTION, SOLUTION INTRAVENOUS at 11:44

## 2019-04-09 RX ADMIN — LIDOCAINE HYDROCHLORIDE 40 MG: 20 INJECTION, SOLUTION INFILTRATION; PERINEURAL at 21:16

## 2019-04-09 RX ADMIN — ACETAMINOPHEN 650 MG: 325 TABLET, FILM COATED ORAL at 00:49

## 2019-04-09 RX ADMIN — MEMANTINE HYDROCHLORIDE 10 MG: 10 TABLET ORAL at 00:49

## 2019-04-09 RX ADMIN — TRAZODONE HYDROCHLORIDE 100 MG: 100 TABLET ORAL at 00:50

## 2019-04-09 RX ADMIN — MORPHINE SULFATE 4 MG: 4 INJECTION INTRAVENOUS at 11:15

## 2019-04-09 RX ADMIN — ESCITALOPRAM OXALATE 20 MG: 10 TABLET ORAL at 08:43

## 2019-04-09 RX ADMIN — SODIUM CHLORIDE, SODIUM GLUCONATE, SODIUM ACETATE, POTASSIUM CHLORIDE AND MAGNESIUM CHLORIDE: 526; 502; 368; 37; 30 INJECTION, SOLUTION INTRAVENOUS at 21:08

## 2019-04-09 RX ADMIN — DEXAMETHASONE SODIUM PHOSPHATE 8 MG: 4 INJECTION, SOLUTION INTRA-ARTICULAR; INTRALESIONAL; INTRAMUSCULAR; INTRAVENOUS; SOFT TISSUE at 21:24

## 2019-04-09 RX ADMIN — ROCURONIUM BROMIDE 40 MG: 10 INJECTION, SOLUTION INTRAVENOUS at 21:25

## 2019-04-09 RX ADMIN — QUETIAPINE FUMARATE 75 MG: 25 TABLET ORAL at 00:49

## 2019-04-09 RX ADMIN — PHENYLEPHRINE HYDROCHLORIDE 100 MCG: 10 INJECTION INTRAVENOUS at 21:21

## 2019-04-09 RX ADMIN — SUCCINYLCHOLINE CHLORIDE 100 MG: 20 INJECTION, SOLUTION INTRAMUSCULAR; INTRAVENOUS at 21:16

## 2019-04-09 RX ADMIN — CEFAZOLIN 2 G: 330 INJECTION, POWDER, FOR SOLUTION INTRAMUSCULAR; INTRAVENOUS at 21:08

## 2019-04-09 ASSESSMENT — PAIN SCALES - GENERAL: PAIN_LEVEL: 0

## 2019-04-09 ASSESSMENT — COGNITIVE AND FUNCTIONAL STATUS - GENERAL
TOILETING: A LOT
EATING MEALS: A LITTLE
MOVING FROM LYING ON BACK TO SITTING ON SIDE OF FLAT BED: UNABLE
WALKING IN HOSPITAL ROOM: TOTAL
SUGGESTED CMS G CODE MODIFIER MOBILITY: CL
TURNING FROM BACK TO SIDE WHILE IN FLAT BAD: A LITTLE
DAILY ACTIVITIY SCORE: 13
DRESSING REGULAR UPPER BODY CLOTHING: A LOT
DRESSING REGULAR LOWER BODY CLOTHING: A LOT
STANDING UP FROM CHAIR USING ARMS: A LOT
HELP NEEDED FOR BATHING: A LOT
MOBILITY SCORE: 10
PERSONAL GROOMING: A LOT
MOVING TO AND FROM BED TO CHAIR: A LOT
SUGGESTED CMS G CODE MODIFIER DAILY ACTIVITY: CL
CLIMB 3 TO 5 STEPS WITH RAILING: TOTAL

## 2019-04-09 ASSESSMENT — COPD QUESTIONNAIRES
DURING THE PAST 4 WEEKS HOW MUCH DID YOU FEEL SHORT OF BREATH: NONE/LITTLE OF THE TIME
DO YOU EVER COUGH UP ANY MUCUS OR PHLEGM?: NO/ONLY WITH OCCASIONAL COLDS OR INFECTIONS
COPD SCREENING SCORE: 4
HAVE YOU SMOKED AT LEAST 100 CIGARETTES IN YOUR ENTIRE LIFE: YES

## 2019-04-09 ASSESSMENT — PATIENT HEALTH QUESTIONNAIRE - PHQ9
1. LITTLE INTEREST OR PLEASURE IN DOING THINGS: NOT AT ALL
SUM OF ALL RESPONSES TO PHQ9 QUESTIONS 1 AND 2: 0
2. FEELING DOWN, DEPRESSED, IRRITABLE, OR HOPELESS: NOT AT ALL

## 2019-04-09 ASSESSMENT — LIFESTYLE VARIABLES: EVER_SMOKED: YES

## 2019-04-09 NOTE — PROGRESS NOTES
Hospital Medicine Daily Progress Note    Date of Service  4/9/2019    Chief Complaint  GLF    Hospital Course    85 y.o. male with Alzheimer's dementia with behavioral disturbance, hypertension, admitted 4/8/2019 with right hip pain after he fell.  Initial x-ray were negative, but hip CT revealed an acute comminuted and impacted fracture of the right femoral neck, with thickened urinary bladder. Chest x-ray showed nothing acute.  He was also found to have acute cystitis.  Patient started on pain medications, along with antibiotics for UTI.  Orthopedics was consulted, who is contemplating on surgical intervention.        Interval Problem Update  4/9/2019 - I reviewed the patient's chart. There were no significant overnight events. Remains hemodynamically stable and afebrile. Stable on RA.  Labs are unimpressive.  He is scheduled for surgical fixation of the hip this afternoon.    > I have personally seen and examined the patient today.  He is only oriented to self.  He states he is hurting on the right hip, and the small of his back.  He denies any nausea or vomiting.  He is not short of breath.      Consultants/Specialty  orthopedics    Code Status  Full    Disposition  Monitor on telemetry.    Review of Systems  ROS     Pertinent positives/negatives as mentioned above.     A complete review of systems was personally done by me, limited by dementia. All other systems were negative.       Physical Exam  Temp:  [36.2 °C (97.1 °F)-37 °C (98.6 °F)] 36.7 °C (98.1 °F)  Pulse:  [67-82] 67  Resp:  [16-20] 16  BP: (112-148)/(66-75) 148/73  SpO2:  [92 %-100 %] 93 %    Physical Exam   Constitutional: He appears well-developed and well-nourished. No distress.   HENT:   Head: Normocephalic and atraumatic.   Mouth/Throat: Oropharynx is clear and moist. No oropharyngeal exudate.   Eyes: Pupils are equal, round, and reactive to light. EOM are normal. Right eye exhibits no discharge. Left eye exhibits no discharge. No scleral  icterus.   Neck: Normal range of motion. Neck supple.   Cardiovascular: Normal rate and regular rhythm.  Exam reveals no gallop and no friction rub.    No murmur heard.  Pulmonary/Chest: Effort normal and breath sounds normal. He has no wheezes. He has no rales. He exhibits no tenderness.   Abdominal: Soft. Bowel sounds are normal. There is no tenderness. There is no rebound and no guarding.   Musculoskeletal: He exhibits no edema.   Right hip externally rotated, limited ROM due to pain.  Tenderness on palpation on the hip.   Lymphadenopathy:     He has no cervical adenopathy.   Neurological: He is alert.   Only oriented to self.  Answers questions, and but refuses to follow commands.   Skin: Skin is warm and dry. No rash noted. He is not diaphoretic. No erythema.   Psychiatric: His behavior is normal.   +dementia  Flat affect   Vitals reviewed.         Fluids    Intake/Output Summary (Last 24 hours) at 04/09/19 0901  Last data filed at 04/09/19 0104   Gross per 24 hour   Intake               30 ml   Output                0 ml   Net               30 ml       Laboratory  Recent Labs      04/08/19   2304   WBC  10.3   RBC  4.37*   HEMOGLOBIN  14.6   HEMATOCRIT  42.1   MCV  96.3   MCH  33.4*   MCHC  34.7   RDW  47.3   PLATELETCT  355   MPV  8.9*     Recent Labs      04/08/19   2304   SODIUM  135   POTASSIUM  3.9   CHLORIDE  103   CO2  23   GLUCOSE  92   BUN  13   CREATININE  1.36   CALCIUM  8.8     Recent Labs      04/08/19 2303   APTT  27.2   INR  1.15*               Imaging  DX-FEMUR-2+ RIGHT   Final Result            Redemonstration of impacted fracture of the right femoral neck.      Postsurgical change from right arthroplasty. No definite distal femur fracture.      DX-CHEST-PORTABLE (1 VIEW)   Final Result         1. No acute cardiopulmonary abnormalities are identified.      CT-HIP W/O PLUS RECONS RIGHT   Final Result         Acute comminuted and impacted fracture of the right femoral neck.      Diffuse wall  thickening of the urinary bladder.      DX-SHOULDER 2+ RIGHT   Final Result      There is no evidence of acute fracture.   Mild osteoarthritis.           Assessment/Plan  * Closed fracture of neck of right femur (HCC)- (present on admission)   Assessment & Plan    -Orthopedics on board, plan for surgical fixation this afternoon.  -start calcium + vit D. Check Vit D level. Will need outpatient bone scan to evaluate for osteoporosis and need for bisphosphonates.   - continue pain control with IV morphine, oral oxycodone.  - Continue pharmacologic DVT prophylaxis while in the hospital. Patient will have a high-risk above the knee procedure. Will need on-going DVT prophylaxis on discharge.  - PT/OT eval postoperatively.         Acute cystitis without hematuria- (present on admission)   Assessment & Plan    -Continue IV ceftriaxone.  Follow urine cultures.     Late onset Alzheimer's disease with behavioral disturbance- (present on admission)   Assessment & Plan    - Continue Seroquel, trazodone, Namenda and Lexapro.  -High risk for delirium, especially postoperatively/post anesthesia. Frequent re-orientation, reestablish circadian rhythm, encourage familiar faces/family in room, avoid or minimize narcotics/sedatives.        Coronary artery disease involving native coronary artery of native heart without angina pectoris- (present on admission)   Assessment & Plan    -Stable.  No chest pain.  No EKG changes.  -Continue medical management.          VTE prophylaxis: lovenox SQ starting tomorrow post-op.

## 2019-04-09 NOTE — ASSESSMENT & PLAN NOTE
-s/p surgical fixation.  -Continue calcium + vit D. Will need outpatient bone scan to evaluate for osteoporosis and need for bisphosphonates.   - continue pain control with IV morphine, oral oxycodone.  - Continue pharmacologic DVT prophylaxis while in the hospital. Patient had a high-risk above the knee procedure. Will need on-going DVT prophylaxis on discharge until fully upright and mobile.  - WBAT per ortho.  -Continue PT/OT evaluation while in-house.  CM/SW working on SNF placement.

## 2019-04-09 NOTE — PROGRESS NOTES
2 RN skin check with Ele  Right knee closed scab  Right shin closed scab  Sacrum red and blanchable  Ramírez in place with ramírez care performed  Heels pick and blanchable     Waffle mattress, Q2 turns, barrier cream

## 2019-04-09 NOTE — ASSESSMENT & PLAN NOTE
- Continue Seroquel, trazodone, Namenda and Lexapro.  - Frequent re-orientation, reestablish circadian rhythm, encourage familiar faces/family in room, avoid or minimize narcotics/sedatives.

## 2019-04-09 NOTE — PROGRESS NOTES
· 2 RN skin check complete with JASON Lucero.  · Devices in place n/a.  · Skin assessed under devices n/a.  · Confirmed pressure ulcers found on n/a.  · New potential pressure ulcers noted on n/a.   · Patient has abrasion x2 s/p fall on right knee, abrasion/bruise on left hip, b/l heels blanchable, redness, boggy and calloused, and sacrum red but blanching barrier cream applied.  · The following interventions in place waffle mattress placed, patient turned q2h, incontinence care provided as needed.

## 2019-04-09 NOTE — PROGRESS NOTES
Patient arrived to  [id number] @0100. Patient disorientated yelling out incomprehensible speech, global aphasia. Patient's chart and MAR reviewed. Pt showing signs of nonverbal pain, tylenol administered with +effect.  Patient was updated on plan of care for the day. Questions answered and concerns addressed.  Pt denies any additional needs at this time. White board updated. Call light, phone and personal belongings within reach.

## 2019-04-09 NOTE — PROGRESS NOTES
Received bedside report. Patient alert to self, disoriented to time, place and situation. Patient becomes very agitated when asked to take medication, VS, assessment, and other nursing care. Explained to patient the importance of each task and patient, will report to hospitalist. Call light within reach, bed alarm on, patient refusing non slip socks.

## 2019-04-09 NOTE — H&P
Hospital Medicine History & Physical Note    Date of Service  4/8/2019    Primary Care Physician  Sourav Stevenson D.O.    Consultants  None    Code Status  Full code    Chief Complaint  Ground-level fall    History of Presenting Illness  85 y.o. male with a past medical history of Alzheimer's disease with behavioral disturbance, angina, hypertension who presented 4/8/2019 with right hip pain.  History is limited due to the patient's dementia.  Apparently the patient had a ground-level fall after which she has been bedbound.  Initial x-rays were negative however CT hip done in the ER reveals an acute comminuted and impacted fracture of the right femoral neck.  Diffuse wall thickening of the urinary bladder.    EKG interpreted by me reveals sinus rhythm with first-degree AV block and poor R wave progression in anterior leads.  No ST elevation or ST depression noted  Chest x-ray interpreted by me reveals no acute cardiopulmonary process    Review of Systems  Review of Systems   Unable to perform ROS: Dementia       Past Medical History   has a past medical history of Abnormal CBC (8/4/2017); Late onset Alzheimer's disease with behavioral disturbance (1/31/2018); and Vitamin D deficiency disease (8/4/2017).    Surgical History  No pertinent surgical history    Family History  No pertinent family history    Social History   reports that he quit smoking about 32 years ago. He has never used smokeless tobacco. He reports that he does not drink alcohol or use drugs.    Allergies  Allergies   Allergen Reactions   • Spironolactone Diarrhea     Severe diarrhea       Medications  Prior to Admission Medications   Prescriptions Last Dose Informant Patient Reported? Taking?   DILTIAZem CD (DILTIAZEM CD) 120 MG CAPSULE SR 24 HR 4/8/2019 at 0800 Family Member No No   Sig: Take 1 Cap by mouth every day.   QUEtiapine (SEROQUEL) 25 MG Tab 4/8/2019 at 0800 Family Member No No   Sig: Take 3 Tabs by mouth 2 times a day.   RANEXA 500  MG TABLET SR 12 HR 4/8/2019 at 0800 Family Member No No   Sig: TAKE 1 TABLET BY MOUTH TWICE DAILY   escitalopram (LEXAPRO) 20 MG tablet 4/8/2019 at 0800 Family Member No No   Sig: TAKE 1 TAB BY MOUTH EVERYDAY.   memantine (NAMENDA) 10 MG Tab 4/8/2019 at 0800 Family Member No No   Sig: TAKE 1 TAB BY MOUTH 2 TIMES A DAY.   traZODone (DESYREL) 100 MG Tab 4/7/2019 at 2000 Family Member No No   Sig: TAKE ONE TABLET BY MOUTH AT BEDTIME       Facility-Administered Medications: None       Physical Exam  Temp:  [36.8 °C (98.2 °F)] 36.8 °C (98.2 °F)  Pulse:  [70-77] 70  Resp:  [18] 18  BP: (126)/(75) 126/75  SpO2:  [100 %] 100 %    Physical Exam   Constitutional: He appears well-developed and well-nourished. He appears distressed.   HENT:   Head: Normocephalic and atraumatic.   Mouth/Throat: Oropharynx is clear and moist.   Eyes: Pupils are equal, round, and reactive to light. Conjunctivae are normal. No scleral icterus.   Neck: Normal range of motion. Neck supple.   Cardiovascular: Normal rate, regular rhythm and normal heart sounds.    Pulmonary/Chest: Effort normal and breath sounds normal. No respiratory distress. He has no wheezes. He has no rales.   Abdominal: Soft. Bowel sounds are normal. He exhibits no distension. There is no tenderness. There is no rebound.   Musculoskeletal: Normal range of motion. He exhibits no edema or tenderness.   Lymphadenopathy:     He has no cervical adenopathy.   Neurological: He is alert. No cranial nerve deficit. Coordination normal.   Speaking gibberish  Moves all 4 extremities purposefully   Skin: Skin is warm. No rash noted.   Psychiatric: His mood appears anxious. He is agitated.   Nursing note and vitals reviewed.      Laboratory:          No results for input(s): ALTSGPT, ASTSGOT, ALKPHOSPHAT, TBILIRUBIN, DBILIRUBIN, GAMMAGT, AMYLASE, LIPASE, ALB, PREALBUMIN, GLUCOSE in the last 72 hours.              No results for input(s): TROPONINI in the last 72 hours.    Urinalysis:    No  results found     Imaging:  DX-FEMUR-2+ RIGHT   Final Result            Redemonstration of impacted fracture of the right femoral neck.      Postsurgical change from right arthroplasty. No definite distal femur fracture.      DX-CHEST-PORTABLE (1 VIEW)   Final Result         1. No acute cardiopulmonary abnormalities are identified.      CT-HIP W/O PLUS RECONS RIGHT   Final Result         Acute comminuted and impacted fracture of the right femoral neck.      Diffuse wall thickening of the urinary bladder.      DX-SHOULDER 2+ RIGHT   Final Result      There is no evidence of acute fracture.   Mild osteoarthritis.            Assessment/Plan:  I anticipate this patient will require at least two midnights for appropriate medical management, necessitating inpatient admission.    Closed fracture of neck of right femur (HCC)- (present on admission)   Assessment & Plan    Orthopedics has been consulted, plan for surgery tomorrow morning  N.p.o. at midnight  IV fluid hydration with normal saline  Patient is low-moderate risk for surgery, no further testing necessary prior to surgery  Pain control with oxycodone and IV morphine, monitor respiratory status closely  PTOT       Acute cystitis without hematuria- (present on admission)   Assessment & Plan    Started on IV ceftriaxone  Follow urine cultures     Late onset Alzheimer's disease with behavioral disturbance- (present on admission)   Assessment & Plan    Continue Seroquel, trazodone, Namenda and Lexapro  frequent reorientation         Coronary artery disease involving native coronary artery of native heart without angina pectoris- (present on admission)   Assessment & Plan    No reported chest pain  No acute ischemic changes noted on EKG         VTE prophylaxis: scd

## 2019-04-09 NOTE — DISCHARGE PLANNING
Spoke with son on phone, Gerardo, regarding dad and possible surgery at 5pm. Son still has not spoke with Ortho at this time. Hospitalist still rounding and will discuss with hospitalist about pt and call son back if needed.

## 2019-04-09 NOTE — CONSULTS
DATE OF SERVICE:  04/09/2019    ORTHOPEDIC CONSULTATION    CHIEF COMPLAINT:  Fall.    HISTORY OF PRESENT ILLNESS:  Patient is an 85-year-old male with Alzheimer   disease, who fell on 04/08/2019, he had some right hip pain, actually what I   understand it was actually a couple of weeks ago.  I could not get a great   history from him.  Initial x-rays were done and a CT scan was done that   revealed fracture of the right femoral neck, it is impacted.    PAST MEDICAL HISTORY:  Significant for Alzheimer's disease and vitamin D   deficiency.    PAST SURGICAL HISTORY:  No surgical pertinent history.    FAMILY HISTORY:  No pertinent family history.    SOCIAL HISTORY:  Patient reports he quit smoking 30 years ago.  He does not   drink or use drugs.    ALLERGIES:  SPIRONOLACTONE.    MEDICATIONS:  Per the med reconciliation.    REVIEW OF SYSTEMS:  As per the H and P, otherwise all systems reviewed and   were negative.    PHYSICAL EXAMINATION:  GENERAL:  He is sleeping comfortable and arousable.  Patient does not really   have a lot right hip pain to examination.   SKIN:  Normal.  MUSCULOSKELETAL:  Leg is neurovascularly intact.  RESPIRATIONS:  Unlabored.  HEENT:  Normal.    DIAGNOSTIC STUDIES:  X-rays of his right femur demonstrate some arthritis with   heterotopic ossification.  CT scan demonstrates an impacted right femoral   neck fracture in pretty good alignment.    IMPRESSION:  1.  Right impacted femoral neck fracture.  2.  Right hip arthritis.  3.  Alzheimer's disease.    PLAN:  At this point, I have not talked to his son, who is a guardian yet.  I   would likely pass the case off to Dr. Parrish to evaluate whether surgical   intervention is appropriate or not.  In the meantime, he can be kept   comfortable at bed rest.       ____________________________________     MD TERESA LIMA / KOFI    DD:  04/09/2019 06:34:19  DT:  04/09/2019 08:01:13    D#:  [id number]  Job#:  [id number]

## 2019-04-09 NOTE — PROGRESS NOTES
Assuming care for Dr. Barrera.  Contacted patient's son Gerardo, to discuss risks/benefits/alternatives of surgery.  Family agrees with the plan, and wishes to proceed.    Plan:  - To OR today for treatment of his right femoral neck fracture  - NPO

## 2019-04-09 NOTE — THERAPY
OT referral received. Pt on bedrest, awaiting surgical fixation. Please re-order OT when cleared for OOB activity.

## 2019-04-09 NOTE — ED PROVIDER NOTES
ED Provider Note    CHIEF COMPLAINT  Chief Complaint   Patient presents with   • ALOC     started today       HPI  Joshua Nieves is a 85 y.o. male who presents from [facility] in [facility].  It is unclear as to why the patient was transported here to the emergency department.  The patient has a history of dementia with behavioral disturbance for which he is being treated with Seroquel under the guidance of neurology.  Has recently had an increase in his dose due to behavioral disturbances in the past.  The patient appeared to be rather agitated at the outside of facility apparently.  I cannot confirm this with anyone at the facility after several calls and no answer.  Tried several numbers and extensions at the facility and had the phone ring for several minutes at a time with no answer.    In speaking with the patient's son at bedside, the patient appears to be at his baseline status over the past month.  Son at bedside notes that over the past month the patient has had more frequent use of gibberish however the patient still is able to respond to some questions appropriately for example and answering his name.    No recent fevers or illness.  Fell about 3 weeks ago for which the patient had right shoulder and hip pain.  The patient has been mostly bedbound since then.  X-rays were negative at the time and the patient was scheduled for repeat x-rays in 2 or 3 days to ensure no obvious underlying fractures.    REVIEW OF SYSTEMS  See HPI for further details. All other systems are negative.     PAST MEDICAL HISTORY   has a past medical history of Abnormal CBC (8/4/2017); Late onset Alzheimer's disease with behavioral disturbance (1/31/2018); and Vitamin D deficiency disease (8/4/2017).    SOCIAL HISTORY  Social History     Social History Main Topics   • Smoking status: Former Smoker     Quit date: 1/1/1987   • Smokeless tobacco: Never Used   • Alcohol use No   • Drug use: No   • Sexual  "activity: No       SURGICAL HISTORY  patient denies any surgical history    CURRENT MEDICATIONS  Home Medications     Reviewed by Ariana Johnson R.N. (Registered Nurse) on 04/08/19 at 1803  Med List Status: <None>   Medication Last Dose Status   acetaminophen (TYLENOL) 500 MG Tab  Active   DILTIAZem CD (DILTIAZEM CD) 120 MG CAPSULE SR 24 HR  Active   escitalopram (LEXAPRO) 20 MG tablet  Active   memantine (NAMENDA) 10 MG Tab  Active   Misc. Devices Misc  Active   QUEtiapine (SEROQUEL) 25 MG Tab  Active   RANEXA 500 MG TABLET SR 12 HR  Active   tramadol (ULTRAM) 50 MG Tab  Active   traZODone (DESYREL) 100 MG Tab  Active                ALLERGIES  Allergies   Allergen Reactions   • Spironolactone Diarrhea     Severe diarrhea       PHYSICAL EXAM  VITAL SIGNS: /75   Pulse 72   Temp 36.8 °C (98.2 °F) (Temporal)   Resp 18   Ht 1.727 m (5' 8\")   Wt 83.5 kg (184 lb)   BMI 27.98 kg/m²   Pulse ox interpretation: I interpret this pulse ox as normal.  Constitutional: Alert in no apparent distress.  HENT: No signs of trauma, Bilateral external ears normal, Nose normal.   Eyes: Pupils are equal and reactive, Conjunctiva normal, Non-icteric.   Neck: Normal range of motion, No tenderness, Supple, No stridor.   Cardiovascular: Regular rate and rhythm.   Thorax & Lungs: Normal breath sounds, No respiratory distress, No wheezing, No chest tenderness.   Abdomen: Bowel sounds normal, Soft, No tenderness, No masses, No pulsatile masses. No peritoneal signs.  Skin: Warm, Dry, No erythema, No rash.   Back: No bony tenderness, No CVA tenderness.   Extremities: Intact distal pulses, No edema,No cyanosis  Musculoskeletal: Pain with range of motion of the right hip and right shoulder. No major deformities noted.   Neurologic: Alert, oriented to self, moving all extremities, Normal sensory function      DIAGNOSTIC STUDIES / PROCEDURES      RADIOLOGY  DX-FEMUR-2+ RIGHT   Final Result            Redemonstration of impacted fracture " of the right femoral neck.      Postsurgical change from right arthroplasty. No definite distal femur fracture.      DX-CHEST-PORTABLE (1 VIEW)   Final Result         1. No acute cardiopulmonary abnormalities are identified.      CT-HIP W/O PLUS RECONS RIGHT   Final Result         Acute comminuted and impacted fracture of the right femoral neck.      Diffuse wall thickening of the urinary bladder.      DX-SHOULDER 2+ RIGHT   Final Result      There is no evidence of acute fracture.   Mild osteoarthritis.          COURSE & MEDICAL DECISION MAKING    Medications   senna-docusate (PERICOLACE or SENOKOT S) 8.6-50 MG per tablet 2 Tab (0 Tabs Oral Held 4/8/19 2245)     And   polyethylene glycol/lytes (MIRALAX) PACKET 1 Packet (not administered)     And   magnesium hydroxide (MILK OF MAGNESIA) suspension 30 mL (not administered)     And   bisacodyl (DULCOLAX) suppository 10 mg (not administered)   Respiratory Care per Protocol (not administered)   NS infusion ( Intravenous New Bag 4/9/19 0104)   acetaminophen (TYLENOL) tablet 650 mg (650 mg Oral Given 4/9/19 0049)   ondansetron (ZOFRAN) syringe/vial injection 4 mg (not administered)   ondansetron (ZOFRAN ODT) dispertab 4 mg (not administered)   Pharmacy Consult Request ...Pain Management Review 1 Each (not administered)     And   oxyCODONE immediate-release (ROXICODONE) tablet 5 mg (not administered)     And   oxyCODONE immediate-release (ROXICODONE) tablet 10 mg (not administered)     And   morphine (pf) 4 mg/ml injection 4 mg (not administered)   hydrALAZINE (APRESOLINE) injection 10 mg (not administered)   escitalopram (LEXAPRO) tablet 20 mg (not administered)   memantine (NAMENDA) tablet 10 mg (10 mg Oral Given 4/9/19 0049)   QUEtiapine (SEROQUEL) tablet 75 mg (75 mg Oral Given 4/9/19 0049)   ranolazine (RANEXA) 500 MG SR tablet TB12 500 mg (500 mg Oral Given 4/9/19 0049)   traZODone (DESYREL) tablet 100 mg (100 mg Oral Given 4/9/19 0050)   DILTIAZem CD (CARDIZEM CD)  "capsule 120 mg (not administered)       Pertinent Labs & Imaging studies reviewed. (See chart for details)  85 y.o. male presenting with abnormal behavior from a nursing facility.  On bedside evaluation in the presence of the patient's son however, he states that this is the patient's baseline over the past about 1 month.  He does note however that the patient fell about 3 weeks ago and has been bedbound since then.  He was scheduled for repeat x-rays later this week.  Due to the patient's continued bedbound state and physical exam showing some pain with range of motion of the right hip, CT evaluation was performed to evaluate for possibility of occult fracture.  I did review the patient's prior x-rays that were unremarkable for any acute fracture.    CT ultimately did show impacted femoral neck fracture.  The patient was admitted to the hospital for further management regarding this fracture.  I was able to speak with Dr. Barrera from [place] [facility] regarding this patient.  The patient will be seen tomorrow morning regarding possible surgical intervention.  The patient was ambulatory with a walker prior to his fall 3 weeks ago.    Right shoulder x-ray is unremarkable.  Spoke with the patient's son regarding the results and plan of care.  He is agreeable with plan of care.    Spoke with the hospitalist, Dr. Coffey regarding admission.  Agreeable with the admission.    /66   Pulse 68   Temp 37 °C (98.6 °F) (Temporal)   Resp 18   Ht 1.727 m (5' 8\")   Wt 89.3 kg (196 lb 13.9 oz)   SpO2 95%   BMI 29.93 kg/m²       FINAL IMPRESSION  1. Dementia with behavioral disturbance, unspecified dementia type    2.      Right hip fracture, closed      Electronically signed by: Santi Mortensen, 4/8/2019 6:06 PM    "

## 2019-04-09 NOTE — ED TRIAGE NOTES
"Chief Complaint   Patient presents with   • ALOC     started today       BIB by EMS from [facility] for above complaint. Per EMS report patient is baseline A+ox2. Per staff at [facility] care patient is talking \"jibberous\". Patient singing in bed. Family at bedside.   "

## 2019-04-10 PROCEDURE — 51798 US URINE CAPACITY MEASURE: CPT

## 2019-04-10 PROCEDURE — 97163 PT EVAL HIGH COMPLEX 45 MIN: CPT

## 2019-04-10 PROCEDURE — 700102 HCHG RX REV CODE 250 W/ 637 OVERRIDE(OP): Performed by: INTERNAL MEDICINE

## 2019-04-10 PROCEDURE — 99233 SBSQ HOSP IP/OBS HIGH 50: CPT | Performed by: INTERNAL MEDICINE

## 2019-04-10 PROCEDURE — A9270 NON-COVERED ITEM OR SERVICE: HCPCS | Performed by: INTERNAL MEDICINE

## 2019-04-10 PROCEDURE — 770001 HCHG ROOM/CARE - MED/SURG/GYN PRIV*

## 2019-04-10 PROCEDURE — 700105 HCHG RX REV CODE 258: Performed by: INTERNAL MEDICINE

## 2019-04-10 PROCEDURE — 97166 OT EVAL MOD COMPLEX 45 MIN: CPT

## 2019-04-10 PROCEDURE — 700111 HCHG RX REV CODE 636 W/ 250 OVERRIDE (IP): Performed by: INTERNAL MEDICINE

## 2019-04-10 RX ADMIN — DILTIAZEM HYDROCHLORIDE 120 MG: 120 CAPSULE, COATED, EXTENDED RELEASE ORAL at 04:55

## 2019-04-10 RX ADMIN — SODIUM CHLORIDE: 9 INJECTION, SOLUTION INTRAVENOUS at 23:05

## 2019-04-10 RX ADMIN — ACETAMINOPHEN 650 MG: 325 TABLET, FILM COATED ORAL at 04:51

## 2019-04-10 RX ADMIN — OXYCODONE HYDROCHLORIDE 10 MG: 10 TABLET ORAL at 14:34

## 2019-04-10 RX ADMIN — QUETIAPINE FUMARATE 75 MG: 25 TABLET ORAL at 19:40

## 2019-04-10 RX ADMIN — CALCIUM CARBONATE-CHOLECALCIFEROL TAB 250 MG-125 UNIT 1 TABLET: 250-125 TAB at 04:52

## 2019-04-10 RX ADMIN — OXYCODONE HYDROCHLORIDE 10 MG: 10 TABLET ORAL at 19:52

## 2019-04-10 RX ADMIN — SENNOSIDES,DOCUSATE SODIUM 2 TABLET: 8.6; 5 TABLET, FILM COATED ORAL at 19:42

## 2019-04-10 RX ADMIN — ENOXAPARIN SODIUM 40 MG: 100 INJECTION SUBCUTANEOUS at 04:55

## 2019-04-10 RX ADMIN — RANOLAZINE 500 MG: 500 TABLET, FILM COATED, EXTENDED RELEASE ORAL at 05:02

## 2019-04-10 RX ADMIN — TRAZODONE HYDROCHLORIDE 100 MG: 100 TABLET ORAL at 19:39

## 2019-04-10 RX ADMIN — SENNOSIDES,DOCUSATE SODIUM 2 TABLET: 8.6; 5 TABLET, FILM COATED ORAL at 04:51

## 2019-04-10 RX ADMIN — RANOLAZINE 500 MG: 500 TABLET, FILM COATED, EXTENDED RELEASE ORAL at 19:40

## 2019-04-10 RX ADMIN — VITAMIN D, TAB 1000IU (100/BT) 5000 UNITS: 25 TAB at 08:22

## 2019-04-10 RX ADMIN — ESCITALOPRAM OXALATE 20 MG: 10 TABLET ORAL at 04:51

## 2019-04-10 RX ADMIN — CEFTRIAXONE SODIUM 2 G: 2 INJECTION, POWDER, FOR SOLUTION INTRAMUSCULAR; INTRAVENOUS at 04:58

## 2019-04-10 RX ADMIN — MORPHINE SULFATE 4 MG: 4 INJECTION INTRAVENOUS at 08:22

## 2019-04-10 RX ADMIN — MEMANTINE HYDROCHLORIDE 10 MG: 10 TABLET ORAL at 04:51

## 2019-04-10 RX ADMIN — QUETIAPINE FUMARATE 75 MG: 25 TABLET ORAL at 04:51

## 2019-04-10 RX ADMIN — MEMANTINE HYDROCHLORIDE 10 MG: 10 TABLET ORAL at 19:44

## 2019-04-10 ASSESSMENT — COGNITIVE AND FUNCTIONAL STATUS - GENERAL
TURNING FROM BACK TO SIDE WHILE IN FLAT BAD: UNABLE
SUGGESTED CMS G CODE MODIFIER MOBILITY: CM
TOILETING: A LOT
WALKING IN HOSPITAL ROOM: TOTAL
CLIMB 3 TO 5 STEPS WITH RAILING: TOTAL
MOBILITY SCORE: 7
PERSONAL GROOMING: A LITTLE
DAILY ACTIVITIY SCORE: 14
STANDING UP FROM CHAIR USING ARMS: A LOT
DRESSING REGULAR UPPER BODY CLOTHING: A LOT
MOVING TO AND FROM BED TO CHAIR: UNABLE
DRESSING REGULAR LOWER BODY CLOTHING: A LOT
SUGGESTED CMS G CODE MODIFIER DAILY ACTIVITY: CK
MOVING FROM LYING ON BACK TO SITTING ON SIDE OF FLAT BED: UNABLE
EATING MEALS: A LITTLE
HELP NEEDED FOR BATHING: A LOT

## 2019-04-10 ASSESSMENT — GAIT ASSESSMENTS
DISTANCE (FEET): 2
ASSISTIVE DEVICE: HAND HELD ASSIST
GAIT LEVEL OF ASSIST: MAXIMAL ASSIST

## 2019-04-10 ASSESSMENT — ACTIVITIES OF DAILY LIVING (ADL): TOILETING: REQUIRES ASSIST

## 2019-04-10 NOTE — ANESTHESIA TIME REPORT
Anesthesia Start and Stop Event Times     Date Time Event    4/9/2019 2108 Anesthesia Start     2228 Anesthesia Stop        Responsible Staff  04/09/19    Name Role Begin End    Fabrizio Sommers III, M.D. Anesth 2108 2228        Preop Diagnosis (Free Text):  Pre-op Diagnosis     right femoral neck fracture        Preop Diagnosis (Codes):  Diagnosis Information     Diagnosis Code(s):         Post op Diagnosis  Fracture of femoral neck, right (HCC)      Premium Reason  A. 3PM - 7AM    Comments: Post op intrathecal injection for postop pain control done in OR under GA per surgeon request

## 2019-04-10 NOTE — ANESTHESIA PREPROCEDURE EVALUATION
Relevant Problems   (+) Angina at rest (HCC)   (+) Coronary artery disease involving native coronary artery of native heart without angina pectoris       Physical Exam    Airway   Mallampati: II  TM distance: >3 FB  Neck ROM: full       Cardiovascular - normal exam  Rhythm: regular  Rate: normal  (-) murmur     Dental - normal exam         Pulmonary - normal exam  Breath sounds clear to auscultation     Abdominal    Neurological - normal exam                 Anesthesia Plan    ASA 3 - emergent   ASA physical status 3 criteria: other (comment)    Plan - general       (AMS, Acute fracture with intractable pain)      Induction: intravenous    Postoperative Plan: Postoperative administration of opioids is intended.    Pertinent diagnostic labs and testing reviewed    Informed Consent:    Anesthetic plan and risks discussed with patient.    Use of blood products discussed with: patient whom consented to blood products.

## 2019-04-10 NOTE — PROGRESS NOTES
Received bedside report. Patient is disoriented to time, events, and location. Patient has global aphasia. Walked into room and patient is tearing off gown and blankets. Explained to patient he had surgery, no learning evident. Fall risk band placed on patient, yellow non slip socks applied onto patient, Bed alarm in place. Call light within reach

## 2019-04-10 NOTE — THERAPY
"Occupational Therapy Evaluation completed.   Functional Status: Supine > < EOB max A, sit > stands mod-max A  Plan of Care: Will benefit from Occupational Therapy 3 times per week  Discharge Recommendations:  Equipment: Will Continue to Assess for Equipment Needs. Post-acute therapy: Discharge to a transitional care facility for continued skilled therapy services.    See \"Rehab Therapy-Acute\" Patient Summary Report for complete documentation.    85 y.o. male with h/o dementia, HTN, s/p GLF with resultant R femur fx. Pt underwent ORIF, now WBAT. Seen for OT eval. Pt presents with forceeful, gibberish speech almost continually, intermittent random counting aloud. With clear, concise direction, pt can engage briefly and follow 1-step commands. Then quickly reverts to limited attention and babble. Oriented only to self. With max A pt mobilized to EOB, performed sit to stands. Was able to demo weight bearing on RLE and take a few lateral steps to L, but appeared painful. On subsequent standing attempts, pt physically resisted. Assisted pt back to supine. Pt was able to use washcloth to wipe down face and chest. Nursing reports pt was total A for feeding. Pt is currently limited by: pain, cognitive deficits, activity intolerance. Spoke to staff at [place] who report that PTA pt was: ambulatory with FWW, required assist with bathing, toileting & dressing, would babble and sing non-stop, but could follow directions with extra cues. Pt is below baseline function, primarily for mobility. Acute OT to follow to maximize functional independence.   "

## 2019-04-10 NOTE — ANESTHESIA PROCEDURE NOTES
Spinal Block  Performed by: RADHA STANLEY  Authorized by: RADHA STANLEY     Patient Location:  OR  Start Time:  4/9/2019 10:04 PM  End Time:  4/9/2019 10:08 PM  Reason for Block: post-op pain management    patient identified, IV checked, site marked, risks and benefits discussed, surgical consent, monitors and equipment checked, pre-op evaluation and timeout performed    Patient Position:  Sitting  Prep: ChloraPrep, patient draped and sterile technique    Monitoring:  Blood pressure, continuous pulse oximetry and heart rate  Approach:  Midline  Location:  L3-4  Injection Technique:  Single-shot  Skin infiltration:  Lidocaine  Strength:  1%  Dose:  3ml  Needle Type:  Quincke  Needle Gauge:  22 G  CSF flowing pre/post injection:  Yes  Sensory Level:  T10   Post op intrathecal injection of marcaine and Duramorph for post op pain control.  Done under GA at end of surgery in OR suite.

## 2019-04-10 NOTE — PROGRESS NOTES
Patient arrived from PACU at 0030. Patient lethargic oriented to self, VSS, LS diminished on 10L oxymask, S1S2 auscultated. Patient R hip dressing C/D/I, ice pack applied. Son has been updated. White board updated. Call light, phone and personal belongings within reach. Bed alarm on.

## 2019-04-10 NOTE — ANESTHESIA PROCEDURE NOTES
Airway  Date/Time: 4/9/2019 9:17 PM  Performed by: RADHA STANLEY  Authorized by: RADHA STANLEY     Location:  OR  Urgency:  Elective  Difficult Airway: No    Indications for Airway Management:  Anesthesia  Spontaneous Ventilation: absent    Sedation Level:  Deep  Preoxygenated: Yes    Patient Position:  Sniffing  Final Airway Type:  Endotracheal airway  Final Endotracheal Airway:  ETT  Cuffed: Yes    Technique Used for Successful ETT Placement:  Direct laryngoscopy  Insertion Site:  Oral  Blade Type:  Dejesus  Laryngoscope Blade/Videolaryngoscope Blade Size:  3  ETT Size (mm):  8.0  Measured from:  Gums  ETT to Gums (cm):  21  Placement Verified by: auscultation and capnometry    Cormack-Lehane Classification:  Grade IIb - view of arytenoids or posterior of glottis only  Number of Attempts at Approach:  1

## 2019-04-10 NOTE — OR NURSING
Patient unable to sign consent spoke with Ced Nieves and did 2 RN consent per proxy. Unable to assess patient due to hx of alzheimer.

## 2019-04-10 NOTE — ANESTHESIA PROCEDURE NOTES
Peripheral IV  Date/Time: 4/9/2019 9:40 PM  Performed by: RADHA STANLEY  Authorized by: RADHA STANLEY     Size:  18 G  Laterality:  Left  Site Prep:  Chlorhexidine  Technique:  Ultrasound guided  Attempts:  3   2 direct attempts left FA then US guided L FA successful

## 2019-04-10 NOTE — PROGRESS NOTES
Hospital Medicine Daily Progress Note    Date of Service  4/10/2019    Chief Complaint  GLF    Hospital Course    85 y.o. male with Alzheimer's dementia with behavioral disturbance, hypertension, admitted 4/8/2019 with right hip pain after he fell.  Initial x-ray were negative, but hip CT revealed an acute comminuted and impacted fracture of the right femoral neck, with thickened urinary bladder. Chest x-ray showed nothing acute.  He was also found to have acute cystitis.  Patient started on pain medications, along with antibiotics for UTI.  She is also started on calcium and vitamin D supplements. Orthopedics was consulted, who is recommended surgical intervention.         Interval Problem Update  4/10/2019 - I reviewed the patient's chart today. Uneventful night. VSS. Afebrile. Saturating well on RA.  Patient underwent percutaneous screw fixation of the right femoral neck fracture, with an complicated perioperative course.  Vitamin D is significantly low at 8, started on replacement.    > I have personally seen and examined the patient today. (+) echolalia, speaks to himself.  Redirectable, answers questions appropriately.  Denies any pain.  States he does not need anything.  Only oriented to self.      Consultants/Specialty  orthopedics    Code Status  Full    Disposition  Monitor on telemetry. PT/OT eval.    Review of Systems  ROS     Pertinent positives/negatives as mentioned above.     A complete review of systems was personally done by me, limited by dementia. All other systems were negative.       Physical Exam  Temp:  [36.6 °C (97.8 °F)-37.5 °C (99.5 °F)] 36.6 °C (97.8 °F)  Pulse:  [66-80] 69  Resp:  [17-19] 18  BP: (116-154)/(54-90) 121/88  SpO2:  [93 %-100 %] 95 %    Physical Exam   Constitutional: He appears well-developed and well-nourished. No distress.   HENT:   Head: Normocephalic and atraumatic.   Mouth/Throat: Oropharynx is clear and moist. No oropharyngeal exudate.   Eyes: Pupils are equal, round,  and reactive to light. EOM are normal. Right eye exhibits no discharge. Left eye exhibits no discharge. No scleral icterus.   Neck: Normal range of motion. Neck supple.   Cardiovascular: Normal rate and regular rhythm.  Exam reveals no gallop and no friction rub.    No murmur heard.  Pulmonary/Chest: Effort normal and breath sounds normal. He has no wheezes. He has no rales. He exhibits no tenderness.   Abdominal: Soft. Bowel sounds are normal. There is no tenderness. There is no rebound and no guarding.   Musculoskeletal: He exhibits no edema.   Right hip surgical site intact, slight tenderness on palpation but no significant swelling.   Lymphadenopathy:     He has no cervical adenopathy.   Neurological: He is alert.   (+) echolalia, speaking to himself.  Easily directable, but only oriented to self.  Answers questions.  (+) dementia   Skin: Skin is warm and dry. No rash noted. He is not diaphoretic. No erythema.   Psychiatric:   +dementia  Flat affect  Unable to fully assess   Vitals reviewed.             Fluids    Intake/Output Summary (Last 24 hours) at 04/10/19 1235  Last data filed at 04/10/19 0600   Gross per 24 hour   Intake             2785 ml   Output             2760 ml   Net               25 ml       Laboratory  Recent Labs      04/08/19   2304   WBC  10.3   RBC  4.37*   HEMOGLOBIN  14.6   HEMATOCRIT  42.1   MCV  96.3   MCH  33.4*   MCHC  34.7   RDW  47.3   PLATELETCT  355   MPV  8.9*     Recent Labs      04/08/19   2304   SODIUM  135   POTASSIUM  3.9   CHLORIDE  103   CO2  23   GLUCOSE  92   BUN  13   CREATININE  1.36   CALCIUM  8.8     Recent Labs      04/08/19   2303   APTT  27.2   INR  1.15*               Imaging  DX-PORTABLE FLUOROSCOPY < 1 HOUR   Final Result         Portable fluoroscopy utilized for 54 seconds.         DX-HIP-UNILATERAL-WITHOUT PELVIS-1 VIEW RIGHT   Final Result      Digitized intraoperative radiograph is submitted for review.  This examination is not for diagnostic purpose but  for guidance during a surgical procedure.      DX-FEMUR-2+ RIGHT   Final Result            Redemonstration of impacted fracture of the right femoral neck.      Postsurgical change from right arthroplasty. No definite distal femur fracture.      DX-CHEST-PORTABLE (1 VIEW)   Final Result         1. No acute cardiopulmonary abnormalities are identified.      CT-HIP W/O PLUS RECONS RIGHT   Final Result         Acute comminuted and impacted fracture of the right femoral neck.      Diffuse wall thickening of the urinary bladder.      DX-SHOULDER 2+ RIGHT   Final Result      There is no evidence of acute fracture.   Mild osteoarthritis.           Assessment/Plan  * Closed fracture of neck of right femur (HCC)- (present on admission)   Assessment & Plan    -s/p surgical fixation.  -Continue calcium + vit D. Will need outpatient bone scan to evaluate for osteoporosis and need for bisphosphonates.   - continue pain control with IV morphine, oral oxycodone.  - Continue pharmacologic DVT prophylaxis while in the hospital. Patient had a high-risk above the knee procedure. Will need on-going DVT prophylaxis on discharge.  - PT/OT eval once cleared for mobilization.         Acute cystitis without hematuria- (present on admission)   Assessment & Plan    -Thickened bladder on CT, but urinalysis not too impressive.  -Continue IV ceftriaxone, but complete 3 days course.  Follow urine cultures.     Vitamin D deficiency disease- (present on admission)   Assessment & Plan    -Start cholecalciferol 5000 units daily.     Late onset Alzheimer's disease with behavioral disturbance- (present on admission)   Assessment & Plan    - Continue Seroquel, trazodone, Namenda and Lexapro.  -High risk for delirium, especially postoperatively/post anesthesia. Frequent re-orientation, reestablish circadian rhythm, encourage familiar faces/family in room, avoid or minimize narcotics/sedatives.        Coronary artery disease involving native coronary  artery of native heart without angina pectoris- (present on admission)   Assessment & Plan    -Stable.  No chest pain.  No EKG changes.  -Continue medical management.          VTE prophylaxis: lovenox SQ

## 2019-04-10 NOTE — PROGRESS NOTES
· 2 RN skin check complete with JASON Lucero.  · Devices in place n/a.  · Skin assessed under devices n/a.  · Confirmed pressure ulcers found on n/a.  · New potential pressure ulcers noted on n/a.   · Patient has abrasion x2 s/p fall on right knee, abrasion/bruise on left hip, b/l heels blanchable, redness, boggy and calloused, and sacrum red but blanching barrier cream applied.  · R hip surgical dressing C/D/I.   · The following interventions in place waffle mattress placed, patient turned q2h, incontinence care provided as needed.

## 2019-04-10 NOTE — THERAPY
"Physical Therapy Evaluation completed.   Bed Mobility: Maximal Assist from supine to sit; Moderate Assist for sit to supine.     Transfers: Unable to Participate    Gait: Moderate Assist with HHA (2-3 lateral steps to his L)       Plan of Care: Will benefit from Physical Therapy 3 times per week  Discharge Recommendations: Equipment: Will Continue to Assess for Equipment Needs. Post-acute therapy Discharge to a transitional care facility for continued skilled therapy services.    Pt admitted following a fall and surgical fixation of his R femur. Pt with hx of dementia with mostly garbled speech and random counting, but pt can be re-directed to follow 1-step commands. On occasion, pt answered questions appropriately and was able to tell PT that he is Bengali and is at [employer]. Question if pt is speaking Bengali at times? He required max A to achieve upright sitting at EOB, but once there, could sustain at close SBA. During sit to stand, pt required mod A for lift off and stability once upright. He took lateral steps of his own volition, but appeared painful, and attempted to sit back down. Upon return to supine, pt requested assist for his B LE onto bed. While here, PT will follow for further functional training. Recommend placement.     See \"Rehab Therapy-Acute\" Patient Summary Report for complete documentation.     "

## 2019-04-10 NOTE — ANESTHESIA QCDR
2019 [facility] Clinical Data Registry (for Quality Improvement)     Postoperative nausea/vomiting risk protocol (Adult = 18 yrs and Pediatric 3-17 yrs)- (430 and 463)  General inhalation anesthetic (NOT TIVA) with PONV risk factors: Yes  Provision of anti-emetic therapy with at least 2 different classes of agents: Yes   Patient DID NOT receive anti-emetic therapy and reason is documented in Medical Record:  N/A    Multimodal Pain Management- (AQI59)  Patient undergoing Elective Surgery (i.e. Outpatient, or ASC, or Prescheduled Surgery prior to Hospital Admission): Yes  Use of Multimodal Pain Management, two or more drugs and/or interventions, NOT including systemic opioids: Yes   Exception: Documented allergy to multiple classes of analgesics:  N/A    PACU assessment of acute postoperative pain prior to Anesthesia Care End- Applies to Patients Age = 18- (ABG7)  Initial PACU pain score is which of the following: < 7/10  Patient unable to report pain score: N/A    Post-anesthetic transfer of care checklist/protocol to PACU/ICU- (426 and 427)  Upon conclusion of case, patient transferred to which of the following locations: PACU/Non-ICU  Use of transfer checklist/protocol: Yes  Exclusion: Service Performed in Patient Hospital Room (and thus did not require transfer): N/A    PACU Reintubation- (AQI31)  General anesthesia requiring endotracheal intubation (ETT) along with subsequent extubation in OR or PACU: No  Required reintubation in the PACU: N/A  Extubation was a planned trial documented in the medical record prior to removal of the original airway device: N/A    Unplanned admission to ICU related to anesthesia service up through end of PACU care- (MD51)  Unplanned admission to ICU (not initially anticipated at anesthesia start time): No

## 2019-04-10 NOTE — OP REPORT
DATE OF OPERATION: 4/9/2019     PREOPERATIVE DIAGNOSIS: right valgus impacted femoral neck fracture    POSTOPERATIVE DIAGNOSIS: Same    PROCEDURE PERFORMED: Percutaneous screw fixation of right femoral neck fracture    SURGEON: Rufus Parrish M.D.     ASSISTANT: Jhony Nesbitt DO    ANESTHESIOLOGIST: Dr. Matthieu MD    ANESTHESIA: General    ASA CLASSIFICATION: III    INDICATIONS: The patient is a 85 y.o. male with a right valgus impacted femoral neck fracture resulting from a ground level fall.  The patient denies antecedent pain, and was found to have a normal neurovascular exam and skin envelope.  Radiographs demonstrated the valgus impacted femoral neck fracture.  Given these findings, the patient is an appropriately indicated candidate for surgical treatment of the femoral neck fracture with percutaneous screw fixation.  I discussed the risks and benefits of the procedure, including the risks of infection, wound healing complication, neurovascular injury, nonunion, malunion, and need for revision surgery including hip arthroplasty or hemiarthroplasty, and the medical risks of anesthesia including MI, stroke, and death.  We also discussed that should the fracture displace, we may need to convert to hemiarthroplasty, and she understands the risks.  Benefits include early mobilization, improved chance of union, and reduction in the medical risks of hip fractures.  Alternatives to surgery were also discussed, including non-operative management, which I did not recommend.  The patient was in agreement with the plan to proceed, and the informed consent was signed and documented.  I met with the patient pre-operatively and marked the operative extremity with their agreement.  We proceeded to the operating room.     WOUND CLASSIFICATION: Class I    SPECIMEN: None    ESTIMATED BLOOD LOSS: Minimal    PROCEDURE:  The patient underwent anesthesia, and was positioned in the supine position on the fracture table with all  bony prominences well padded.  Sequential compression devices were employed.  The correct operative site was prepped and draped into a sterile field, and the surgical team scrubbed in.  A procedural pause was conducted to verify correct patient, correct extremity, presence of the surgeons initials on the operative extremity, and administration of IV antibiotics, in this case, Ancef.    After generalized agreement, a small lateral hip incision was localized, and a guide pin for the OIC 7.3mm cannulated screw system was advanced down to a start point for a inferior central screw along the lateral proximal femur.  This was advanced into the femoral neck and head in an inferior and central position, confirmed by AP and lateral fluoroscopy.  Guide pins were placed in a similar manner in a posterior central and anterior superior position, confirmed by fluoroscopy.  When that was complete, we measured our screws, a placed the OIC 7.3 mm three partially threaded cannulated screws over the wires, final tightening all screws by hand.  Final fluoroscopic imaging demonstrated appropriate reduction of the fracture and appropriate length of all screws.  The wound was irrigated and closed with 2-0 vicryl sutures and staples.  Sterile dressings were applied and the patient was transferred to the recovery room, sustaining no complications.      Post-Operative Plan:    1.  The patient should bear weight as tolerated on their operative extremity.  Gait aids (crutch or crutches, cane, walker) may be used as needed, and may be discontinued when no longer required.  2.  IV antibiotics - Ancef may be continued for 24 hours, but not required.  3.  DVT prophylaxis - SCD's and Lovenox 40 mg SQ daily while inpatient.  The patient may transition to Aspirin 325 mg PO BID as an outpatient  4.  Discharge planning  ____________________________________   Rufus Parrish M.D.   DD: 4/9/2019  9:50 PM

## 2019-04-10 NOTE — ANESTHESIA POSTPROCEDURE EVALUATION
Patient: Joshua Nieves    Procedure Summary     Date:  04/09/19 Room / Location:  [place] [address] / SURGERY [facility]    Anesthesia Start:  2108 Anesthesia Stop:  2228    Procedure:  FIXATION, HIP, USING CANNULATED SCREW (Right Hip) Diagnosis:  (right femoral neck fracture)    Surgeon:  Rufus Parrish M.D. Responsible Provider:  Fabrizio Sommers III, M.D.    Anesthesia Type:  general ASA Status:  3 - Emergent          Final Anesthesia Type: general  Last vitals  BP   Blood Pressure : 112/65, NIBP: 107/88    Temp   37 °C (98.6 °F)    Pulse   Pulse: 65, Heart Rate (Monitored): 81   Resp   17    SpO2   91 %      Anesthesia Post Evaluation    Patient location during evaluation: PACU  Patient participation: complete - patient participated  Level of consciousness: awake and alert  Pain score: 0    Airway patency: patent  Anesthetic complications: no  Cardiovascular status: hemodynamically stable  Respiratory status: acceptable  Hydration status: euvolemic    PONV: none           Nurse Pain Score: 2 (NPRS)

## 2019-04-11 ENCOUNTER — APPOINTMENT (OUTPATIENT)
Dept: RADIOLOGY | Facility: MEDICAL CENTER | Age: 84
End: 2019-04-11
Attending: INTERNAL MEDICINE
Payer: MEDICARE

## 2019-04-11 PROBLEM — D62 ACUTE BLOOD LOSS AS CAUSE OF POSTOPERATIVE ANEMIA: Status: ACTIVE | Noted: 2019-04-11

## 2019-04-11 PROBLEM — R41.0 DELIRIUM WITH DEMENTIA: Status: ACTIVE | Noted: 2019-04-11

## 2019-04-11 LAB
ANION GAP SERPL CALC-SCNC: 8 MMOL/L (ref 0–11.9)
BASOPHILS # BLD AUTO: 0.7 % (ref 0–1.8)
BASOPHILS # BLD: 0.05 K/UL (ref 0–0.12)
BUN SERPL-MCNC: 17 MG/DL (ref 8–22)
CALCIUM SERPL-MCNC: 8.1 MG/DL (ref 8.5–10.5)
CHLORIDE SERPL-SCNC: 110 MMOL/L (ref 96–112)
CO2 SERPL-SCNC: 22 MMOL/L (ref 20–33)
CREAT SERPL-MCNC: 1.29 MG/DL (ref 0.5–1.4)
EOSINOPHIL # BLD AUTO: 0.07 K/UL (ref 0–0.51)
EOSINOPHIL NFR BLD: 1 % (ref 0–6.9)
ERYTHROCYTE [DISTWIDTH] IN BLOOD BY AUTOMATED COUNT: 50.4 FL (ref 35.9–50)
GLUCOSE SERPL-MCNC: 83 MG/DL (ref 65–99)
HCT VFR BLD AUTO: 37.8 % (ref 42–52)
HGB BLD-MCNC: 11.9 G/DL (ref 14–18)
IMM GRANULOCYTES # BLD AUTO: 0.03 K/UL (ref 0–0.11)
IMM GRANULOCYTES NFR BLD AUTO: 0.4 % (ref 0–0.9)
LYMPHOCYTES # BLD AUTO: 1.09 K/UL (ref 1–4.8)
LYMPHOCYTES NFR BLD: 14.8 % (ref 22–41)
MCH RBC QN AUTO: 32.2 PG (ref 27–33)
MCHC RBC AUTO-ENTMCNC: 31.5 G/DL (ref 33.7–35.3)
MCV RBC AUTO: 102.4 FL (ref 81.4–97.8)
MONOCYTES # BLD AUTO: 0.79 K/UL (ref 0–0.85)
MONOCYTES NFR BLD AUTO: 10.7 % (ref 0–13.4)
NEUTROPHILS # BLD AUTO: 5.32 K/UL (ref 1.82–7.42)
NEUTROPHILS NFR BLD: 72.4 % (ref 44–72)
NRBC # BLD AUTO: 0 K/UL
NRBC BLD-RTO: 0 /100 WBC
PLATELET # BLD AUTO: 262 K/UL (ref 164–446)
PMV BLD AUTO: 9.1 FL (ref 9–12.9)
POTASSIUM SERPL-SCNC: 4 MMOL/L (ref 3.6–5.5)
RBC # BLD AUTO: 3.69 M/UL (ref 4.7–6.1)
SODIUM SERPL-SCNC: 140 MMOL/L (ref 135–145)
WBC # BLD AUTO: 7.4 K/UL (ref 4.8–10.8)

## 2019-04-11 PROCEDURE — A9270 NON-COVERED ITEM OR SERVICE: HCPCS | Performed by: INTERNAL MEDICINE

## 2019-04-11 PROCEDURE — 85025 COMPLETE CBC W/AUTO DIFF WBC: CPT

## 2019-04-11 PROCEDURE — 80048 BASIC METABOLIC PNL TOTAL CA: CPT

## 2019-04-11 PROCEDURE — 700102 HCHG RX REV CODE 250 W/ 637 OVERRIDE(OP): Performed by: INTERNAL MEDICINE

## 2019-04-11 PROCEDURE — 700111 HCHG RX REV CODE 636 W/ 250 OVERRIDE (IP): Performed by: INTERNAL MEDICINE

## 2019-04-11 PROCEDURE — 36415 COLL VENOUS BLD VENIPUNCTURE: CPT

## 2019-04-11 PROCEDURE — 700105 HCHG RX REV CODE 258: Performed by: INTERNAL MEDICINE

## 2019-04-11 PROCEDURE — 770001 HCHG ROOM/CARE - MED/SURG/GYN PRIV*

## 2019-04-11 PROCEDURE — 99233 SBSQ HOSP IP/OBS HIGH 50: CPT | Performed by: INTERNAL MEDICINE

## 2019-04-11 RX ADMIN — DILTIAZEM HYDROCHLORIDE 120 MG: 120 CAPSULE, COATED, EXTENDED RELEASE ORAL at 05:54

## 2019-04-11 RX ADMIN — QUETIAPINE FUMARATE 75 MG: 25 TABLET ORAL at 18:36

## 2019-04-11 RX ADMIN — CALCIUM CARBONATE-CHOLECALCIFEROL TAB 250 MG-125 UNIT 1 TABLET: 250-125 TAB at 05:54

## 2019-04-11 RX ADMIN — MEMANTINE HYDROCHLORIDE 10 MG: 10 TABLET ORAL at 05:54

## 2019-04-11 RX ADMIN — RANOLAZINE 500 MG: 500 TABLET, FILM COATED, EXTENDED RELEASE ORAL at 18:35

## 2019-04-11 RX ADMIN — CEFTRIAXONE SODIUM 2 G: 2 INJECTION, POWDER, FOR SOLUTION INTRAMUSCULAR; INTRAVENOUS at 05:52

## 2019-04-11 RX ADMIN — OXYCODONE HYDROCHLORIDE 5 MG: 5 TABLET ORAL at 18:37

## 2019-04-11 RX ADMIN — RANOLAZINE 500 MG: 500 TABLET, FILM COATED, EXTENDED RELEASE ORAL at 05:53

## 2019-04-11 RX ADMIN — VITAMIN D, TAB 1000IU (100/BT) 5000 UNITS: 25 TAB at 05:53

## 2019-04-11 RX ADMIN — SODIUM CHLORIDE: 9 INJECTION, SOLUTION INTRAVENOUS at 09:11

## 2019-04-11 RX ADMIN — QUETIAPINE FUMARATE 75 MG: 25 TABLET ORAL at 05:54

## 2019-04-11 RX ADMIN — OXYCODONE HYDROCHLORIDE 5 MG: 5 TABLET ORAL at 21:26

## 2019-04-11 RX ADMIN — ENOXAPARIN SODIUM 40 MG: 100 INJECTION SUBCUTANEOUS at 06:16

## 2019-04-11 RX ADMIN — OXYCODONE HYDROCHLORIDE 10 MG: 10 TABLET ORAL at 06:12

## 2019-04-11 RX ADMIN — TRAZODONE HYDROCHLORIDE 100 MG: 100 TABLET ORAL at 20:59

## 2019-04-11 RX ADMIN — SENNOSIDES,DOCUSATE SODIUM 2 TABLET: 8.6; 5 TABLET, FILM COATED ORAL at 05:54

## 2019-04-11 RX ADMIN — SODIUM CHLORIDE: 9 INJECTION, SOLUTION INTRAVENOUS at 20:59

## 2019-04-11 RX ADMIN — ESCITALOPRAM OXALATE 20 MG: 10 TABLET ORAL at 05:54

## 2019-04-11 RX ADMIN — MEMANTINE HYDROCHLORIDE 10 MG: 10 TABLET ORAL at 18:36

## 2019-04-11 RX ADMIN — SENNOSIDES,DOCUSATE SODIUM 2 TABLET: 8.6; 5 TABLET, FILM COATED ORAL at 18:35

## 2019-04-11 ASSESSMENT — PATIENT HEALTH QUESTIONNAIRE - PHQ9
1. LITTLE INTEREST OR PLEASURE IN DOING THINGS: NOT AT ALL
2. FEELING DOWN, DEPRESSED, IRRITABLE, OR HOPELESS: NOT AT ALL
SUM OF ALL RESPONSES TO PHQ9 QUESTIONS 1 AND 2: 0

## 2019-04-11 NOTE — DISCHARGE PLANNING
In the case of an emergency, pt's legal NOK son  Gerardo Nieves H:[phone]                         C:[phone]    JASON MUNOZ met with pt and son, Gerardo at bedside and obtained the information used in this assessment. Pt verified accuracy of facesheet. Pt lives at [facility]'s [phone].  Prior to current hospitalization, pt was dependent in ADLS/IADLS. Pt does not drive and is able to attend necessary MD appointments.  Pt has no financial concerns. Pt has a good support system. Pt denies any hx of substance use and denies any dx of mh.          Care Transition Team Assessment    Information Source  Orientation : Disoriented to Event, Disoriented to Place, Disoriented to Time  Information Given By: Relative  Informant's Name: Gerardo Nieves   Who is responsible for making decisions for patient? : POA  Name(s) of Primary Decision Maker: Gerardo Nieves          Elopement Risk  Legal Hold: No  Ambulatory or Self Mobile in Wheelchair: No-Not an Elopement Risk  Elopement Risk: Not at Risk for Elopement    Interdisciplinary Discharge Planning  Lives with - Patient's Self Care Capacity: Attendant / Paid Care Giver  Housing / Facility: Assisted Living Residence  Prior Services: Continuous (24 Hour) Care Giving Per Service    Discharge Preparedness  What is your plan after discharge?: Skilled nursing facility  What are your discharge supports?: Child  Prior Functional Level: Needs Assist with Activities of Daily Living, Needs Assist with Medication Management, Uses Walker, Uses Wheelchair  Difficulity with ADLs: Bathing, Toileting, Walking  Difficulty with ADLs Comment: Lives     Functional Assesment  Prior Functional Level: Needs Assist with Activities of Daily Living, Needs Assist with Medication Management, Uses Walker, Uses Wheelchair                   Advance Directive  Advance Directive?: DPOA for Health Care, POLST  Durable Power of  Name and Contact : Gerardo Nieves     Domestic Abuse  Have you ever been  the victim of abuse or violence?: Not Sure         Discharge Risks or Barriers  Discharge risks or barriers?: Complex medical needs  Patient risk factors: Complex medical needs    Anticipated Discharge Information  Anticipated discharge disposition: SNF

## 2019-04-11 NOTE — DISCHARGE PLANNING
RN CM spoke with Pt son, Gerardo Nieves [phone]) or [phone]) about SNF choice.    Gerardo is looking over the list and going to research the options before making choice.    CM to f/u with Gerardo later today for SNF choice.

## 2019-04-11 NOTE — PROGRESS NOTES
"   Orthopaedic Progress Note    Interval changes:  Right hip dressing CDI  Pain controlled   Cleared for DC     ROS - Patient demented but reports only minor pain    /88   Pulse 69   Temp 36.6 °C (97.8 °F) (Temporal)   Resp 18   Ht 1.727 m (5' 8\")   Wt 89.3 kg (196 lb 13.9 oz)   SpO2 95%       Patient seen and examined  No acute distress  Breathing non labored  RRR  Right hip Surgical dressings are clean, dry, and intact. Patient clearly fires tibialis anterior, EHL, and gastrocnemius/soleus. Sensation is intact to light touch throughout superficial peroneal, deep peroneal, tibial, saphenous, and sural nerve distributions. Strong and palpable 2+ dorsalis pedis and posterior tibial pulses with capillary refill less than 2 seconds. No lower leg tenderness or discomfort.        Recent Labs      04/08/19   2304   WBC  10.3   RBC  4.37*   HEMOGLOBIN  14.6   HEMATOCRIT  42.1   MCV  96.3   MCH  33.4*   MCHC  34.7   RDW  47.3   PLATELETCT  355   MPV  8.9*       Active Hospital Problems    Diagnosis   • Closed fracture of neck of right femur (HCC) [S72.001A]     Priority: High   • Acute cystitis without hematuria [N30.00]     Priority: Medium   • Vitamin D deficiency disease [E55.9]     Priority: Medium   • Late onset Alzheimer's disease with behavioral disturbance [G30.1, F02.81]     Priority: Low   • Coronary artery disease involving native coronary artery of native heart without angina pectoris [I25.10]     Priority: Low       Assessment/Plan:  Cleared for DC by ortho pending medicine clearance  POD#1 S/P Percutaneous screw fixation of right femoral neck fracture  Wt bearing status - WBAT  Wound care/Drains - dressing change every other day by nursing starting tomorrow  Future Procedures - none planned   Lovenox: Start 4/10, Duration-until ambulatory > 150'  Sutures/Staples out- 10-14 days post operatively  PT/OT-initiated  Antibiotics: rocephin 2g IV QD  DVT Prophylaxis- TEDS/SCDs/Foot pumps  Blum-none  Case " Coordination for Discharge Planning - Disposition SNF

## 2019-04-11 NOTE — DISCHARGE PLANNING
RN CM met with pt son, Gerardo for SNF choice.    [facility]  [facility]    CM faxed choice to Ellie OTOOLE

## 2019-04-11 NOTE — PROGRESS NOTES
"Hospital Medicine Daily Progress Note    Date of Service  4/11/2019    Chief Complaint  GLF    Hospital Course    85 y.o. male with Alzheimer's dementia with behavioral disturbance, hypertension, admitted 4/8/2019 with right hip pain after he fell.  Initial x-ray were negative, but hip CT revealed an acute comminuted and impacted fracture of the right femoral neck, with thickened urinary bladder. Chest x-ray showed nothing acute.  He was also found to have acute cystitis.  Patient started on pain medications, along with antibiotics for UTI.  She is also started on calcium and vitamin D supplements. Orthopedics was consulted, who recommended surgical intervention. Patient underwent percutaneous screw fixation of the right femoral neck fracture, with an complicated perioperative course.  Vitamin D is significantly low at 8, started on replacement.             Interval Problem Update  4/11/2019 - I reviewed the patient's chart. There were no significant overnight events. Remains hemodynamically stable and afebrile. Stable on RA.  No leukocytosis.  Hemoglobin 11.9, which is a drop from 14.6 yesterday.  No bandemia.  BMP unimpressive.  Creatinine stable.  Orthopedics cleared for weightbearing as tolerated.  PT/OT recommending SNF placement.    > I have personally seen and examined the patient today.  He is more confused today.  States \"stop pinching\" when right hip is palpated.  He refuses to answer further questions.           Consultants/Specialty  orthopedics    Code Status  Full    Disposition  Monitor on telemetry. SNF placement.    Review of Systems  ROS     Unable to obtain due to delirium      Physical Exam  Temp:  [36.7 °C (98.1 °F)-36.8 °C (98.3 °F)] 36.8 °C (98.3 °F)  Pulse:  [60-65] 60  Resp:  [16-18] 18  BP: (113-146)/(55-76) 146/76  SpO2:  [91 %-96 %] 96 %    Physical Exam   Constitutional: He appears well-developed and well-nourished. No distress.   HENT:   Head: Normocephalic and atraumatic. "   Mouth/Throat: Oropharynx is clear and moist. No oropharyngeal exudate.   Eyes: Pupils are equal, round, and reactive to light. EOM are normal. Right eye exhibits no discharge. Left eye exhibits no discharge. No scleral icterus.   Neck: Normal range of motion. Neck supple.   Cardiovascular: Normal rate and regular rhythm.  Exam reveals no gallop and no friction rub.    No murmur heard.  Pulmonary/Chest: Effort normal and breath sounds normal. He has no wheezes. He has no rales. He exhibits no tenderness.   Abdominal: Soft. Bowel sounds are normal. There is no tenderness. There is no rebound and no guarding.   Musculoskeletal: He exhibits no edema.   Right hip surgical site intact, minimal tenderness on palpation. No significant swelling.   Lymphadenopathy:     He has no cervical adenopathy.   Neurological: He is alert.   (+) confused. Oriented only to self. Unable to answer questions coherently.    Skin: Skin is warm and dry. No rash noted. He is not diaphoretic. No erythema.   Psychiatric:   Unable to assess psych exam   Vitals reviewed.             Fluids    Intake/Output Summary (Last 24 hours) at 04/11/19 0831  Last data filed at 04/11/19 0621   Gross per 24 hour   Intake             1780 ml   Output                0 ml   Net             1780 ml       Laboratory  Recent Labs      04/08/19 2304 04/11/19 0740   WBC  10.3  7.4   RBC  4.37*  3.69*   HEMOGLOBIN  14.6  11.9*   HEMATOCRIT  42.1  37.8*   MCV  96.3  102.4*   MCH  33.4*  32.2   MCHC  34.7  31.5*   RDW  47.3  50.4*   PLATELETCT  355  262   MPV  8.9*  9.1     Recent Labs      04/08/19 2304 04/11/19   0740   SODIUM  135  140   POTASSIUM  3.9  4.0   CHLORIDE  103  110   CO2  23  22   GLUCOSE  92  83   BUN  13  17   CREATININE  1.36  1.29   CALCIUM  8.8  8.1*     Recent Labs      04/08/19 2303   APTT  27.2   INR  1.15*               Imaging  DX-PORTABLE FLUOROSCOPY < 1 HOUR   Final Result         Portable fluoroscopy utilized for 54 seconds.          DX-HIP-UNILATERAL-WITHOUT PELVIS-1 VIEW RIGHT   Final Result      Digitized intraoperative radiograph is submitted for review.  This examination is not for diagnostic purpose but for guidance during a surgical procedure.      DX-FEMUR-2+ RIGHT   Final Result            Redemonstration of impacted fracture of the right femoral neck.      Postsurgical change from right arthroplasty. No definite distal femur fracture.      DX-CHEST-PORTABLE (1 VIEW)   Final Result         1. No acute cardiopulmonary abnormalities are identified.      CT-HIP W/O PLUS RECONS RIGHT   Final Result         Acute comminuted and impacted fracture of the right femoral neck.      Diffuse wall thickening of the urinary bladder.      DX-SHOULDER 2+ RIGHT   Final Result      There is no evidence of acute fracture.   Mild osteoarthritis.           Assessment/Plan  * Closed fracture of neck of right femur (HCC)- (present on admission)   Assessment & Plan    -s/p surgical fixation.  -Continue calcium + vit D. Will need outpatient bone scan to evaluate for osteoporosis and need for bisphosphonates.   - continue pain control with IV morphine, oral oxycodone.  - Continue pharmacologic DVT prophylaxis while in the hospital. Patient had a high-risk above the knee procedure. Will need on-going DVT prophylaxis on discharge until fully upright and mobile.  - WBAT per ortho.  -Continue PT/OT evaluation while in-house.  Will need continued skilled therapies, pursue SNF placement.         Acute blood loss as cause of postoperative anemia   Assessment & Plan    -We will monitor his hemoglobin closely, transfuse if drops below 7.     Delirium with dementia   Assessment & Plan    -Continue to monitor for worsening delirium. Frequent re-orientation, reestablish circadian rhythm, encourage familiar faces/family in room, avoid or minimize narcotics/sedatives.  If continues to worsen, will consider starting him on low-dose Risperdal or Seroquel.     Acute cystitis  without hematuria- (present on admission)   Assessment & Plan    -Completed 3 days course of IV ceftriaxone.     Vitamin D deficiency disease- (present on admission)   Assessment & Plan    -Continue cholecalciferol 5000 units daily.     Late onset Alzheimer's disease with behavioral disturbance- (present on admission)   Assessment & Plan    - Continue Seroquel, trazodone, Namenda and Lexapro.  - Frequent re-orientation, reestablish circadian rhythm, encourage familiar faces/family in room, avoid or minimize narcotics/sedatives.        Coronary artery disease involving native coronary artery of native heart without angina pectoris- (present on admission)   Assessment & Plan    -Stable.  No chest pain.  No EKG changes.  -Continue medical management.          VTE prophylaxis: lovenox SQ

## 2019-04-11 NOTE — ASSESSMENT & PLAN NOTE
-Hemoglobin trended down, but remains above transfusion threshold.  Patient refused further blood draws.

## 2019-04-11 NOTE — PROGRESS NOTES
Assumed care at 1900, bedside report received from day shift RN. Initial assessment completed, orders reviewed, call light within reach, bed alarm in use, and hourly rounding in place. POC addressed with patient, no additional questions at this time.

## 2019-04-11 NOTE — ASSESSMENT & PLAN NOTE
-Continue to monitor for worsening delirium. Continue Frequent re-orientation, reestablish circadian rhythm, encourage familiar faces/family in room, avoid or minimize narcotics/sedatives.  Continue seroquel.  Start PRN Haldol IM for extreme agitation.

## 2019-04-11 NOTE — DISCHARGE PLANNING
Received Choice form at 1530  Agency/Facility Name: [facility]  Referral sent per Choice form at 1542

## 2019-04-12 LAB
BASOPHILS # BLD AUTO: 0.9 % (ref 0–1.8)
BASOPHILS # BLD: 0.05 K/UL (ref 0–0.12)
EOSINOPHIL # BLD AUTO: 0.21 K/UL (ref 0–0.51)
EOSINOPHIL NFR BLD: 3.7 % (ref 0–6.9)
ERYTHROCYTE [DISTWIDTH] IN BLOOD BY AUTOMATED COUNT: 49.4 FL (ref 35.9–50)
HCT VFR BLD AUTO: 34.4 % (ref 42–52)
HGB BLD-MCNC: 11.2 G/DL (ref 14–18)
IMM GRANULOCYTES # BLD AUTO: 0.02 K/UL (ref 0–0.11)
IMM GRANULOCYTES NFR BLD AUTO: 0.4 % (ref 0–0.9)
LYMPHOCYTES # BLD AUTO: 0.96 K/UL (ref 1–4.8)
LYMPHOCYTES NFR BLD: 16.9 % (ref 22–41)
MCH RBC QN AUTO: 32.7 PG (ref 27–33)
MCHC RBC AUTO-ENTMCNC: 32.6 G/DL (ref 33.7–35.3)
MCV RBC AUTO: 100.6 FL (ref 81.4–97.8)
MONOCYTES # BLD AUTO: 0.71 K/UL (ref 0–0.85)
MONOCYTES NFR BLD AUTO: 12.5 % (ref 0–13.4)
NEUTROPHILS # BLD AUTO: 3.72 K/UL (ref 1.82–7.42)
NEUTROPHILS NFR BLD: 65.6 % (ref 44–72)
NRBC # BLD AUTO: 0 K/UL
NRBC BLD-RTO: 0 /100 WBC
PLATELET # BLD AUTO: 220 K/UL (ref 164–446)
PMV BLD AUTO: 8.8 FL (ref 9–12.9)
RBC # BLD AUTO: 3.42 M/UL (ref 4.7–6.1)
WBC # BLD AUTO: 5.7 K/UL (ref 4.8–10.8)

## 2019-04-12 PROCEDURE — 700102 HCHG RX REV CODE 250 W/ 637 OVERRIDE(OP): Performed by: INTERNAL MEDICINE

## 2019-04-12 PROCEDURE — 85025 COMPLETE CBC W/AUTO DIFF WBC: CPT

## 2019-04-12 PROCEDURE — 99232 SBSQ HOSP IP/OBS MODERATE 35: CPT | Performed by: INTERNAL MEDICINE

## 2019-04-12 PROCEDURE — A9270 NON-COVERED ITEM OR SERVICE: HCPCS | Performed by: INTERNAL MEDICINE

## 2019-04-12 PROCEDURE — 700105 HCHG RX REV CODE 258: Performed by: INTERNAL MEDICINE

## 2019-04-12 PROCEDURE — 700111 HCHG RX REV CODE 636 W/ 250 OVERRIDE (IP): Performed by: INTERNAL MEDICINE

## 2019-04-12 PROCEDURE — 770001 HCHG ROOM/CARE - MED/SURG/GYN PRIV*

## 2019-04-12 PROCEDURE — 36415 COLL VENOUS BLD VENIPUNCTURE: CPT

## 2019-04-12 RX ADMIN — TRAZODONE HYDROCHLORIDE 100 MG: 100 TABLET ORAL at 19:51

## 2019-04-12 RX ADMIN — MEMANTINE HYDROCHLORIDE 10 MG: 10 TABLET ORAL at 05:05

## 2019-04-12 RX ADMIN — VITAMIN D, TAB 1000IU (100/BT) 5000 UNITS: 25 TAB at 05:05

## 2019-04-12 RX ADMIN — MEMANTINE HYDROCHLORIDE 10 MG: 10 TABLET ORAL at 17:26

## 2019-04-12 RX ADMIN — QUETIAPINE FUMARATE 75 MG: 25 TABLET ORAL at 17:26

## 2019-04-12 RX ADMIN — ENOXAPARIN SODIUM 40 MG: 100 INJECTION SUBCUTANEOUS at 05:05

## 2019-04-12 RX ADMIN — OXYCODONE HYDROCHLORIDE 5 MG: 5 TABLET ORAL at 00:18

## 2019-04-12 RX ADMIN — SENNOSIDES,DOCUSATE SODIUM 2 TABLET: 8.6; 5 TABLET, FILM COATED ORAL at 17:26

## 2019-04-12 RX ADMIN — OXYCODONE HYDROCHLORIDE 5 MG: 5 TABLET ORAL at 05:05

## 2019-04-12 RX ADMIN — OXYCODONE HYDROCHLORIDE 5 MG: 5 TABLET ORAL at 19:51

## 2019-04-12 RX ADMIN — RANOLAZINE 500 MG: 500 TABLET, FILM COATED, EXTENDED RELEASE ORAL at 05:05

## 2019-04-12 RX ADMIN — DILTIAZEM HYDROCHLORIDE 120 MG: 120 CAPSULE, COATED, EXTENDED RELEASE ORAL at 05:05

## 2019-04-12 RX ADMIN — ACETAMINOPHEN 650 MG: 325 TABLET, FILM COATED ORAL at 22:58

## 2019-04-12 RX ADMIN — CALCIUM CARBONATE-CHOLECALCIFEROL TAB 250 MG-125 UNIT 1 TABLET: 250-125 TAB at 05:05

## 2019-04-12 RX ADMIN — SODIUM CHLORIDE: 9 INJECTION, SOLUTION INTRAVENOUS at 17:33

## 2019-04-12 RX ADMIN — SENNOSIDES,DOCUSATE SODIUM 2 TABLET: 8.6; 5 TABLET, FILM COATED ORAL at 05:05

## 2019-04-12 RX ADMIN — SODIUM CHLORIDE: 9 INJECTION, SOLUTION INTRAVENOUS at 05:43

## 2019-04-12 RX ADMIN — OXYCODONE HYDROCHLORIDE 5 MG: 5 TABLET ORAL at 15:47

## 2019-04-12 RX ADMIN — RANOLAZINE 500 MG: 500 TABLET, FILM COATED, EXTENDED RELEASE ORAL at 17:26

## 2019-04-12 RX ADMIN — ESCITALOPRAM OXALATE 20 MG: 10 TABLET ORAL at 05:05

## 2019-04-12 RX ADMIN — QUETIAPINE FUMARATE 75 MG: 25 TABLET ORAL at 05:05

## 2019-04-12 NOTE — PROGRESS NOTES
Hospital Medicine Daily Progress Note    Date of Service  4/12/2019    Chief Complaint  GLF    Hospital Course    85 y.o. male with Alzheimer's dementia with behavioral disturbance, hypertension, admitted 4/8/2019 with right hip pain after he fell.  Initial x-ray were negative, but hip CT revealed an acute comminuted and impacted fracture of the right femoral neck, with thickened urinary bladder. Chest x-ray showed nothing acute.  He was also found to have acute cystitis.  Patient started on pain medications, along with antibiotics for UTI.  She is also started on calcium and vitamin D supplements. Orthopedics was consulted, who recommended surgical intervention. Patient underwent percutaneous screw fixation of the right femoral neck fracture, with an complicated perioperative course.  Vitamin D is significantly low at 8, started on replacement.  Also had postoperative acute blood loss anemia, but remains above transfusion threshold. Orthopedics cleared for weightbearing as tolerated.  He had issues with delirium in setting of his dementia while in the hospital. PT/OT recommending SNF placement.           Interval Problem Update  4/12/2019 - I reviewed the patient's chart today. Uneventful night. VSS. Afebrile. Saturating well on RA.  Hemoglobin stable at 11.2.  Remains without leukocytosis.    > I have personally seen and examined the patient today.  He is not very forthcoming, but shakes his head when asked if he has pain.  But after, he points to his groin.  He seems to be comfortable, and was sleeping prior to my visit.  When she was in pain when I touched his right hip.      Consultants/Specialty  orthopedics    Code Status  Full    Disposition  Monitor on telemetry. SNF placement.    Review of Systems  ROS     Unable to obtain due to delirium/dementia      Physical Exam  Temp:  [36.2 °C (97.2 °F)-37.3 °C (99.1 °F)] 36.2 °C (97.2 °F)  Pulse:  [56-63] 56  Resp:  [16-20] 18  BP: (119-147)/(64-87) 147/87  SpO2:   [91 %-96 %] 91 %    Physical Exam   Constitutional: He appears well-developed and well-nourished. No distress.   HENT:   Head: Normocephalic and atraumatic.   Mouth/Throat: Oropharynx is clear and moist. No oropharyngeal exudate.   Eyes: Pupils are equal, round, and reactive to light. EOM are normal. Right eye exhibits no discharge. Left eye exhibits no discharge. No scleral icterus.   Neck: Normal range of motion. Neck supple.   Cardiovascular: Normal rate and regular rhythm.  Exam reveals no gallop and no friction rub.    No murmur heard.  Pulmonary/Chest: Effort normal and breath sounds normal. He has no wheezes. He has no rales. He exhibits no tenderness.   Abdominal: Soft. Bowel sounds are normal. There is no tenderness. There is no rebound and no guarding.   Musculoskeletal: He exhibits no edema.   Tenderness on the right hip.  No swelling.  Surgical site intact, dressing CDI.   Lymphadenopathy:     He has no cervical adenopathy.   Neurological: He is alert.   Refuses to answers questions verbally, but shakes head when asked.  Confused.   Skin: Skin is warm and dry. No rash noted. He is not diaphoretic. No erythema.   Psychiatric:   Unable to assess psych exam   Vitals reviewed.           Fluids    Intake/Output Summary (Last 24 hours) at 04/12/19 1106  Last data filed at 04/12/19 0600   Gross per 24 hour   Intake             1200 ml   Output                1 ml   Net             1199 ml       Laboratory  Recent Labs      04/11/19   0740  04/12/19   0240   WBC  7.4  5.7   RBC  3.69*  3.42*   HEMOGLOBIN  11.9*  11.2*   HEMATOCRIT  37.8*  34.4*   MCV  102.4*  100.6*   MCH  32.2  32.7   MCHC  31.5*  32.6*   RDW  50.4*  49.4   PLATELETCT  262  220   MPV  9.1  8.8*     Recent Labs      04/11/19   0740   SODIUM  140   POTASSIUM  4.0   CHLORIDE  110   CO2  22   GLUCOSE  83   BUN  17   CREATININE  1.29   CALCIUM  8.1*                   Imaging  DX-PORTABLE FLUOROSCOPY < 1 HOUR   Final Result         Portable  fluoroscopy utilized for 54 seconds.         DX-HIP-UNILATERAL-WITHOUT PELVIS-1 VIEW RIGHT   Final Result      Digitized intraoperative radiograph is submitted for review.  This examination is not for diagnostic purpose but for guidance during a surgical procedure.      DX-FEMUR-2+ RIGHT   Final Result            Redemonstration of impacted fracture of the right femoral neck.      Postsurgical change from right arthroplasty. No definite distal femur fracture.      DX-CHEST-PORTABLE (1 VIEW)   Final Result         1. No acute cardiopulmonary abnormalities are identified.      CT-HIP W/O PLUS RECONS RIGHT   Final Result         Acute comminuted and impacted fracture of the right femoral neck.      Diffuse wall thickening of the urinary bladder.      DX-SHOULDER 2+ RIGHT   Final Result      There is no evidence of acute fracture.   Mild osteoarthritis.           Assessment/Plan  * Closed fracture of neck of right femur (HCC)- (present on admission)   Assessment & Plan    -s/p surgical fixation.  -Continue calcium + vit D. Will need outpatient bone scan to evaluate for osteoporosis and need for bisphosphonates.   - continue pain control with IV morphine, oral oxycodone.  - Continue pharmacologic DVT prophylaxis while in the hospital. Patient had a high-risk above the knee procedure. Will need on-going DVT prophylaxis on discharge until fully upright and mobile.  - WBAT per ortho.  -Continue PT/OT evaluation while in-house.  Await SNF placement.         Acute blood loss as cause of postoperative anemia   Assessment & Plan    -Hemoglobin slowly trending down, continue to monitor closely. transfuse if drops below 7.     Delirium with dementia   Assessment & Plan    -Continue to monitor for worsening delirium. Frequent re-orientation, reestablish circadian rhythm, encourage familiar faces/family in room, avoid or minimize narcotics/sedatives.  Nonpharmacologic interventions for now, but if he worsens, will start him on  low-dose Risperdal or Seroquel.     Acute cystitis without hematuria- (present on admission)   Assessment & Plan    -Completed 3 days course of IV ceftriaxone.     Vitamin D deficiency disease- (present on admission)   Assessment & Plan    -Continue cholecalciferol 5000 units daily.     Late onset Alzheimer's disease with behavioral disturbance- (present on admission)   Assessment & Plan    - Continue Seroquel, trazodone, Namenda and Lexapro.  - Frequent re-orientation, reestablish circadian rhythm, encourage familiar faces/family in room, avoid or minimize narcotics/sedatives.        Coronary artery disease involving native coronary artery of native heart without angina pectoris- (present on admission)   Assessment & Plan    -Stable.  No chest pain.  No EKG changes.  -Continue medical management.          VTE prophylaxis: lovenox SQ

## 2019-04-12 NOTE — PROGRESS NOTES
"S:  Seen and examined.  POD #2 s/p R hip pinning.  Doing OK this PM, remains confused.    O: /64   Pulse 62   Temp 37.3 °C (99.1 °F) (Temporal)   Resp 16   Ht 1.727 m (5' 8\")   Wt 87.6 kg (193 lb 2 oz)   SpO2 94% .    Intake/Output Summary (Last 24 hours) at 04/11/19 2235  Last data filed at 04/11/19 2200   Gross per 24 hour   Intake             1700 ml   Output                0 ml   Net             1700 ml   .    Operative/injured extremity examined.  Wound c/d/i    Recent Labs      04/08/19   2304  04/11/19   0740   WBC  10.3  7.4   RBC  4.37*  3.69*   HEMOGLOBIN  14.6  11.9*   HEMATOCRIT  42.1  37.8*   MCV  96.3  102.4*   MCH  33.4*  32.2   MCHC  34.7  31.5*   RDW  47.3  50.4*   PLATELETCT  355  262   MPV  8.9*  9.1       A/P:    POD #2 s/p R hip pinning    Antibiotics: None required  Activity: WBAT operative extremity.  PT today.  Diet: General  DVT: Mechanical (SCDs) + Pharmacologic (Lovenox or per hospitalist)  Dispo: D/C planning    "

## 2019-04-12 NOTE — DISCHARGE PLANNING
Agency/Facility Name: [facility]  Spoke To: Kirti  Outcome: Re-sent referral to [phone] per request from Kirti.    Agency/Facility Name: [facility]  Spoke To: Mariya  Outcome: Patient declined. Behaviors.

## 2019-04-12 NOTE — CARE PLAN
Problem: Respiratory:  Goal: Respiratory status will improve  Outcome: MET Date Met: 04/12/19  Patient satting 95% on room air

## 2019-04-12 NOTE — PROGRESS NOTES
· 2 RN skin check complete with JASON Mar.  · Devices in place PIV.  · New potential pressure ulcers noted on Left Ischium. Wound consult placed and wound reported.  · The following interventions in place Waffle Pad, Q2H turns, adequate hydration, minimizing skin moisture, mepliex placed on suspected pressure injury.

## 2019-04-12 NOTE — PROGRESS NOTES
"ORTHOPAEDIC SURGERY PROGRESS NOTE     /73   Pulse (!) 57   Temp 37 °C (98.6 °F) (Temporal)   Resp 16   Ht 1.727 m (5' 8\")   Wt 87.6 kg (193 lb 2 oz)   SpO2 96%     Laboratory Values  Recent Labs      04/11/19   0740  04/12/19   0240   WBC  7.4  5.7   RBC  3.69*  3.42*   HEMOGLOBIN  11.9*  11.2*   HEMATOCRIT  37.8*  34.4*   MCV  102.4*  100.6*   MCH  32.2  32.7   MCHC  31.5*  32.6*   RDW  50.4*  49.4   PLATELETCT  262  220   MPV  9.1  8.8*     Recent Labs      04/11/19   0740   SODIUM  140   POTASSIUM  4.0   CHLORIDE  110   CO2  22   GLUCOSE  83   BUN  17             Physical Exam  Gen: no acute distress, confused baseline  Extremities:  RLE:   -dressings clean and dry, no strikethrough   -SILT DP/SP/saph/sural nerves   -+TA/EHL/GCS/Peroneals   -all compartments soft and compressible, no pain with PROM    -skin warm and well-perfused, brisk capillary refill      Assessment  85M status post CRPP right valgus imp FNF 4/9    Plan  Admission: inpatient  Antibiotics: periop  Analgesia: multimodal   Activity/WB: WBAT, PT/OT  Anticoagulation: SCDs, pharm lovenox per primary   Diet: advance as tolerated  Additional surgery: none  Disposition: dc planning       Jhony Grossman DO, MSc    "

## 2019-04-12 NOTE — PROGRESS NOTES
"   Orthopaedic Progress Note    Interval changes:  Right hip dressing changed incisions without issue  Pain controlled   Cleared for DC back to memory care vs SNF    ROS - Patient demented but reports only minor pain    /73   Pulse (!) 57   Temp 37 °C (98.6 °F) (Temporal)   Resp 16   Ht 1.727 m (5' 8\")   Wt 87.6 kg (193 lb 2 oz)   SpO2 96%       Patient seen and examined  No acute distress  Breathing non labored  RRR  Right hip urgical dressings changed, surgical incisions are well approximated and are dry and clean.  There is no erythema, induration, or signs of infection at any of the incision sites. Patient clearly fires tibialis anterior, EHL, and gastrocnemius/soleus. Sensation is intact to light touch throughout superficial peroneal, deep peroneal, tibial, saphenous, and sural nerve distributions. Strong and palpable 2+ dorsalis pedis and posterior tibial pulses with capillary refill less than 2 seconds. No lower leg tenderness or discomfort.        Recent Labs      04/11/19   0740  04/12/19   0240   WBC  7.4  5.7   RBC  3.69*  3.42*   HEMOGLOBIN  11.9*  11.2*   HEMATOCRIT  37.8*  34.4*   MCV  102.4*  100.6*   MCH  32.2  32.7   MCHC  31.5*  32.6*   RDW  50.4*  49.4   PLATELETCT  262  220   MPV  9.1  8.8*       Active Hospital Problems    Diagnosis   • Closed fracture of neck of right femur (HCC) [S72.001A]     Priority: High   • Delirium with dementia [R41.0]     Priority: Medium   • Acute blood loss as cause of postoperative anemia [D62]     Priority: Medium   • Acute cystitis without hematuria [N30.00]     Priority: Medium   • Vitamin D deficiency disease [E55.9]     Priority: Medium   • Late onset Alzheimer's disease with behavioral disturbance [G30.1, F02.81]     Priority: Low   • Coronary artery disease involving native coronary artery of native heart without angina pectoris [I25.10]     Priority: Low       Assessment/Plan:  Cleared for DC by ortho pending medicine clearance  POD#2 S/P " Percutaneous screw fixation of right femoral neck fracture  Wt bearing status - WBAT  Wound care/Drains - dressing change every other day by nursing    Future Procedures - none planned   Lovenox: Start 4/10, Duration-until ambulatory > 150'  Sutures/Staples out-  14 days post operatively  PT/OT-initiated  Antibiotics: rocephin 2g IV QD  DVT Prophylaxis- TEDS/SCDs/Foot pumps  Blum-none  Case Coordination for Discharge Planning - Disposition SNF

## 2019-04-12 NOTE — DISCHARGE PLANNING
Agency/Facility Name: [facility]  Outcome: Referral sent to [facility] per choice form from 4/11.    Agency/Facility Name: [facility]  Outcome: Left voicemail requesting bed availability.

## 2019-04-12 NOTE — THERAPY
"Attempted therapy follow up session. Pt sleeping and lethargic upon arrival. Was able to open eyes and acknowledge therapist. Pt deferred EOB activity due to being \"too tired\" and during conversation pt would close eyes and fall back asleep. Rn notified and aware. Will continue to follow up as appropriate.   "

## 2019-04-12 NOTE — PROGRESS NOTES
Patient alert to self only, mumbles often with garbled speech, does have some clear sentences occasionally. On room air, medical status. Incontinent of urine. IV fluids infusing. Patient is a feed, had CNA assistance with breakfast. Call bell in hand but patient unable to understand concept, calls out for nursing staff when confused. Bed locked and in lowest position, whiteboard updated, frequent rounding for toileting and skin care.

## 2019-04-12 NOTE — CARE PLAN
Problem: Safety  Goal: Will remain free from injury  Outcome: PROGRESSING AS EXPECTED  Patient instructed to call for assistance with getting out of bed and going to the bathroom.    Problem: Mobility  Goal: Risk for activity intolerance will decrease  Outcome: PROGRESSING SLOWER THAN EXPECTED  New pressure injury noted on left hip, mepilex applied, waffle pad already in use, q2h turns with nursing staff completed, skin kept clean of urine.

## 2019-04-12 NOTE — DISCHARGE PLANNING
Agency/Facility Name: [facility]  Outcome: Left voicemail to follow up on referral status.    Agency[facility] Name: [facility]  Spoke To: Mariya  Outcome: Resent referral per Mariya's request.

## 2019-04-13 PROCEDURE — 700102 HCHG RX REV CODE 250 W/ 637 OVERRIDE(OP): Performed by: INTERNAL MEDICINE

## 2019-04-13 PROCEDURE — A9270 NON-COVERED ITEM OR SERVICE: HCPCS | Performed by: INTERNAL MEDICINE

## 2019-04-13 PROCEDURE — 700111 HCHG RX REV CODE 636 W/ 250 OVERRIDE (IP): Performed by: INTERNAL MEDICINE

## 2019-04-13 PROCEDURE — 99232 SBSQ HOSP IP/OBS MODERATE 35: CPT | Performed by: INTERNAL MEDICINE

## 2019-04-13 PROCEDURE — 770001 HCHG ROOM/CARE - MED/SURG/GYN PRIV*

## 2019-04-13 RX ORDER — CLONIDINE HYDROCHLORIDE 0.1 MG/1
0.2 TABLET ORAL EVERY 4 HOURS PRN
Status: DISCONTINUED | OUTPATIENT
Start: 2019-04-13 | End: 2019-04-16 | Stop reason: HOSPADM

## 2019-04-13 RX ADMIN — SENNOSIDES,DOCUSATE SODIUM 2 TABLET: 8.6; 5 TABLET, FILM COATED ORAL at 06:28

## 2019-04-13 RX ADMIN — QUETIAPINE FUMARATE 75 MG: 25 TABLET ORAL at 06:28

## 2019-04-13 RX ADMIN — CLONIDINE HYDROCHLORIDE 0.2 MG: 0.1 TABLET ORAL at 18:28

## 2019-04-13 RX ADMIN — TRAZODONE HYDROCHLORIDE 100 MG: 100 TABLET ORAL at 22:04

## 2019-04-13 RX ADMIN — QUETIAPINE FUMARATE 75 MG: 25 TABLET ORAL at 17:28

## 2019-04-13 RX ADMIN — RANOLAZINE 500 MG: 500 TABLET, FILM COATED, EXTENDED RELEASE ORAL at 06:28

## 2019-04-13 RX ADMIN — RANOLAZINE 500 MG: 500 TABLET, FILM COATED, EXTENDED RELEASE ORAL at 17:28

## 2019-04-13 RX ADMIN — MEMANTINE HYDROCHLORIDE 10 MG: 10 TABLET ORAL at 17:28

## 2019-04-13 RX ADMIN — MEMANTINE HYDROCHLORIDE 10 MG: 10 TABLET ORAL at 06:28

## 2019-04-13 RX ADMIN — CALCIUM CARBONATE-CHOLECALCIFEROL TAB 250 MG-125 UNIT 1 TABLET: 250-125 TAB at 06:29

## 2019-04-13 RX ADMIN — VITAMIN D, TAB 1000IU (100/BT) 5000 UNITS: 25 TAB at 06:29

## 2019-04-13 RX ADMIN — ESCITALOPRAM OXALATE 20 MG: 10 TABLET ORAL at 06:28

## 2019-04-13 RX ADMIN — DILTIAZEM HYDROCHLORIDE 120 MG: 120 CAPSULE, COATED, EXTENDED RELEASE ORAL at 06:28

## 2019-04-13 NOTE — CARE PLAN
Problem: Safety  Goal: Will remain free from injury  Pt confused and oriented only to self. Bed alarm in place, side rails upx3, bed locked and in lowest position, hourly rounding in place. Pt close to nursing station

## 2019-04-13 NOTE — PROGRESS NOTES
During change of shift, patient began vomiting. Cleaned up, sitting at edge of bed. Pulled his IV out accidentally, IV fluids stopped.
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
## 2019-04-13 NOTE — PROGRESS NOTES
[facility] Medicine Daily Progress Note    Date of Service  4/13/2019    Chief Complaint  GLF    Hospital Course    85 y.o. male with Alzheimer's dementia with behavioral disturbance, hypertension, admitted 4/8/2019 with right hip pain after he fell.  Initial x-ray were negative, but hip CT revealed an acute comminuted and impacted fracture of the right femoral neck, with thickened urinary bladder. Chest x-ray showed nothing acute.  He was also found to have acute cystitis.  Patient started on pain medications, along with antibiotics for UTI.  She is also started on calcium and vitamin D supplements. Orthopedics was consulted, who recommended surgical intervention. Patient underwent percutaneous screw fixation of the right femoral neck fracture, with an complicated perioperative course.  Vitamin D is significantly low at 8, started on replacement.  Also had postoperative acute blood loss anemia, but remains above transfusion threshold. Orthopedics cleared for weightbearing as tolerated.  He had issues with delirium in setting of his dementia while in the hospital. PT/OT recommending SNF placement.         Interval Problem Update  4/13/2019 - I reviewed the patient's chart. There were no significant overnight events. Remains hemodynamically stable and afebrile. Stable on RA.  Patient refused labs today.  SNF placement being pursued.    > I have personally seen and examined the patient today.  He is not very cooperative today.  Does not want to answer questions.  He appears comfortable, and not in pain.      Consultants/Specialty  orthopedics    Code Status  Full    Disposition  Monitor on telemetry. Await SNF placement.    Review of Systems  ROS     Unable to obtain due to dementia/delirium.        Physical Exam  Temp:  [36.4 °C (97.6 °F)-37.4 °C (99.4 °F)] 36.4 °C (97.6 °F)  Pulse:  [64-74] 67  Resp:  [17-20] 18  BP: (138-156)/(64-87) 144/67  SpO2:  [89 %-96 %] 96 %    Physical Exam   Constitutional: He appears  well-developed and well-nourished. No distress.   HENT:   Head: Normocephalic and atraumatic.   Mouth/Throat: Oropharynx is clear and moist. No oropharyngeal exudate.   Eyes: Pupils are equal, round, and reactive to light. EOM are normal. Right eye exhibits no discharge. Left eye exhibits no discharge. No scleral icterus.   Neck: Normal range of motion. Neck supple.   Cardiovascular: Normal rate and regular rhythm.  Exam reveals no gallop and no friction rub.    No murmur heard.  Pulmonary/Chest: Effort normal and breath sounds normal. He has no wheezes. He has no rales. He exhibits no tenderness.   Abdominal: Soft. Bowel sounds are normal. There is no tenderness. There is no rebound and no guarding.   Musculoskeletal: He exhibits no edema.   Minimal tenderness in the right hip.  No obvious swelling.  Dressing CDI.   Lymphadenopathy:     He has no cervical adenopathy.   Neurological: He is alert.   Not cooperative today. (+) Dementia, confused   Skin: Skin is warm and dry. No rash noted. He is not diaphoretic. No erythema.   Psychiatric:   Unable to assess psych exam   Vitals reviewed.         Fluids  No intake or output data in the 24 hours ending 04/13/19 1107    Laboratory  Recent Labs      04/11/19   0740  04/12/19   0240   WBC  7.4  5.7   RBC  3.69*  3.42*   HEMOGLOBIN  11.9*  11.2*   HEMATOCRIT  37.8*  34.4*   MCV  102.4*  100.6*   MCH  32.2  32.7   MCHC  31.5*  32.6*   RDW  50.4*  49.4   PLATELETCT  262  220   MPV  9.1  8.8*     Recent Labs      04/11/19   0740   SODIUM  140   POTASSIUM  4.0   CHLORIDE  110   CO2  22   GLUCOSE  83   BUN  17   CREATININE  1.29   CALCIUM  8.1*                   Imaging  DX-PORTABLE FLUOROSCOPY < 1 HOUR   Final Result         Portable fluoroscopy utilized for 54 seconds.         DX-HIP-UNILATERAL-WITHOUT PELVIS-1 VIEW RIGHT   Final Result      Digitized intraoperative radiograph is submitted for review.  This examination is not for diagnostic purpose but for guidance during a  surgical procedure.      DX-FEMUR-2+ RIGHT   Final Result            Redemonstration of impacted fracture of the right femoral neck.      Postsurgical change from right arthroplasty. No definite distal femur fracture.      DX-CHEST-PORTABLE (1 VIEW)   Final Result         1. No acute cardiopulmonary abnormalities are identified.      CT-HIP W/O PLUS RECONS RIGHT   Final Result         Acute comminuted and impacted fracture of the right femoral neck.      Diffuse wall thickening of the urinary bladder.      DX-SHOULDER 2+ RIGHT   Final Result      There is no evidence of acute fracture.   Mild osteoarthritis.           Assessment/Plan  * Closed fracture of neck of right femur (HCC)- (present on admission)   Assessment & Plan    -s/p surgical fixation.  -Continue calcium + vit D. Will need outpatient bone scan to evaluate for osteoporosis and need for bisphosphonates.   - continue pain control with IV morphine, oral oxycodone.  - Continue pharmacologic DVT prophylaxis while in the hospital. Patient had a high-risk above the knee procedure. Will need on-going DVT prophylaxis on discharge until fully upright and mobile.  - WBAT per ortho.  -Continue PT/OT evaluation while in-house.  CM/SW working on SNF placement.         Acute blood loss as cause of postoperative anemia   Assessment & Plan    -Hemoglobin slowly trending down, continue to monitor closely. transfuse if drops below 7. Hgb in AM.     Delirium with dementia   Assessment & Plan    -Continue to monitor for worsening delirium. Continue Frequent re-orientation, reestablish circadian rhythm, encourage familiar faces/family in room, avoid or minimize narcotics/sedatives.  Continue nonpharmacologic interventions for now, but if he worsens, will start him on low-dose Risperdal or Seroquel.     Acute cystitis without hematuria- (present on admission)   Assessment & Plan    -Completed 3 days course of IV ceftriaxone.     Vitamin D deficiency disease- (present on  admission)   Assessment & Plan    -Continue cholecalciferol 5000 units daily.     Late onset Alzheimer's disease with behavioral disturbance- (present on admission)   Assessment & Plan    - Continue Seroquel, trazodone, Namenda and Lexapro.  - Frequent re-orientation, reestablish circadian rhythm, encourage familiar faces/family in room, avoid or minimize narcotics/sedatives.        Coronary artery disease involving native coronary artery of native heart without angina pectoris- (present on admission)   Assessment & Plan    -Stable.  No chest pain.  No EKG changes.  -Continue medical management.          VTE prophylaxis: lovenox SQ

## 2019-04-13 NOTE — PROGRESS NOTES
CNA asked pt to please let him clean him up that he has been refusing to be cleans and is laying in his urine pt agreed as CNA started the process he ones again started being verbally abusive. CNA stepped out of the room to let pt clam down. Pt also refused lunch at 1300
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
## 2019-04-14 PROCEDURE — A9270 NON-COVERED ITEM OR SERVICE: HCPCS | Performed by: INTERNAL MEDICINE

## 2019-04-14 PROCEDURE — 770001 HCHG ROOM/CARE - MED/SURG/GYN PRIV*

## 2019-04-14 PROCEDURE — 51798 US URINE CAPACITY MEASURE: CPT

## 2019-04-14 PROCEDURE — 700102 HCHG RX REV CODE 250 W/ 637 OVERRIDE(OP): Performed by: INTERNAL MEDICINE

## 2019-04-14 PROCEDURE — 700111 HCHG RX REV CODE 636 W/ 250 OVERRIDE (IP): Performed by: INTERNAL MEDICINE

## 2019-04-14 PROCEDURE — 97530 THERAPEUTIC ACTIVITIES: CPT

## 2019-04-14 PROCEDURE — 99232 SBSQ HOSP IP/OBS MODERATE 35: CPT | Performed by: INTERNAL MEDICINE

## 2019-04-14 RX ORDER — HALOPERIDOL 5 MG/ML
2 INJECTION INTRAMUSCULAR EVERY 4 HOURS PRN
Status: DISCONTINUED | OUTPATIENT
Start: 2019-04-14 | End: 2019-04-16 | Stop reason: HOSPADM

## 2019-04-14 RX ADMIN — HALOPERIDOL LACTATE 2 MG: 5 INJECTION, SOLUTION INTRAMUSCULAR at 22:37

## 2019-04-14 RX ADMIN — QUETIAPINE FUMARATE 75 MG: 25 TABLET ORAL at 17:25

## 2019-04-14 RX ADMIN — OXYCODONE HYDROCHLORIDE 5 MG: 5 TABLET ORAL at 19:53

## 2019-04-14 RX ADMIN — DILTIAZEM HYDROCHLORIDE 120 MG: 120 CAPSULE, COATED, EXTENDED RELEASE ORAL at 06:27

## 2019-04-14 RX ADMIN — RANOLAZINE 500 MG: 500 TABLET, FILM COATED, EXTENDED RELEASE ORAL at 17:25

## 2019-04-14 RX ADMIN — CALCIUM CARBONATE-CHOLECALCIFEROL TAB 250 MG-125 UNIT 1 TABLET: 250-125 TAB at 06:27

## 2019-04-14 RX ADMIN — QUETIAPINE FUMARATE 75 MG: 25 TABLET ORAL at 06:27

## 2019-04-14 RX ADMIN — MEMANTINE HYDROCHLORIDE 10 MG: 10 TABLET ORAL at 06:27

## 2019-04-14 RX ADMIN — TRAZODONE HYDROCHLORIDE 100 MG: 100 TABLET ORAL at 19:53

## 2019-04-14 RX ADMIN — ESCITALOPRAM OXALATE 20 MG: 10 TABLET ORAL at 06:27

## 2019-04-14 RX ADMIN — SENNOSIDES,DOCUSATE SODIUM 2 TABLET: 8.6; 5 TABLET, FILM COATED ORAL at 17:25

## 2019-04-14 RX ADMIN — SENNOSIDES,DOCUSATE SODIUM 2 TABLET: 8.6; 5 TABLET, FILM COATED ORAL at 06:27

## 2019-04-14 RX ADMIN — MEMANTINE HYDROCHLORIDE 10 MG: 10 TABLET ORAL at 17:25

## 2019-04-14 RX ADMIN — ENOXAPARIN SODIUM 40 MG: 100 INJECTION SUBCUTANEOUS at 06:27

## 2019-04-14 RX ADMIN — VITAMIN D, TAB 1000IU (100/BT) 5000 UNITS: 25 TAB at 06:27

## 2019-04-14 ASSESSMENT — COGNITIVE AND FUNCTIONAL STATUS - GENERAL
CLIMB 3 TO 5 STEPS WITH RAILING: A LOT
MOBILITY SCORE: 13
MOVING FROM LYING ON BACK TO SITTING ON SIDE OF FLAT BED: A LITTLE
SUGGESTED CMS G CODE MODIFIER MOBILITY: CL
MOVING TO AND FROM BED TO CHAIR: UNABLE
WALKING IN HOSPITAL ROOM: A LITTLE
STANDING UP FROM CHAIR USING ARMS: A LITTLE
TURNING FROM BACK TO SIDE WHILE IN FLAT BAD: UNABLE

## 2019-04-14 ASSESSMENT — GAIT ASSESSMENTS
DEVIATION: STEP TO;OTHER (COMMENT)
GAIT LEVEL OF ASSIST: MINIMAL ASSIST
ASSISTIVE DEVICE: FRONT WHEEL WALKER
DISTANCE (FEET): 40

## 2019-04-14 NOTE — PROGRESS NOTES
2 RN skin check performed on pt. Noted areas of skin breakdown on L and R knees. L knee with small pink area. R knee with area of dark purple tissue injury. Sacrum with blanchable redness, and L hip partial thickness skin tear with redness. Pt has been refusing turns, is confused. Pt allowed RN to turn him to R side. Pillows positioned between knees. Waffle cushion on mattress

## 2019-04-14 NOTE — WOUND TEAM
Wound consult placed on 04/12 for left hip, bilateral knees and sacrum. Chart reviewed. This RN up to T822/00 to assess patient. This RN in with bedside CNALidna. Attempted to assess patient. Pt refused and is aggressive. Bedside team to call this RN when pt more awake and compliant.

## 2019-04-14 NOTE — THERAPY
"Physical Therapy Treatment completed.   Bed Mobility:  Supine to Sit: Moderate Assist  Transfers: Sit to Stand: Minimal Assist  Gait: Level Of Assist: Minimal Assist with Front-Wheel Walker       Plan of Care: Will benefit from Physical Therapy 3 times per week  Discharge Recommendations: Equipment: Will Continue to Assess for Equipment Needs. Post-acute therapy: Recommend inpatient transitional care services for continued physical therapy services.      See \"Rehab Therapy-Acute\" Patient Summary Report for complete documentation.     Patient progressing with functional mobility. He was able to ambulated with FWW and min A, demonstrated short L step length and decreased timothy. He required min A for bed mobility and often required repetition to follow verbal cues and sequencing. Patient continues to present with constant nonsensical verbalizations though able to respond appropriately to questions at times. Will continue to follow during acute stay.  "

## 2019-04-14 NOTE — PROGRESS NOTES
Report received from JASON Cruz. Assumed care of patient. Updated patient on plan of care. Fall precautions in place, call light within reach.
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
## 2019-04-14 NOTE — CARE PLAN
Problem: Safety  Goal: Will remain free from falls  Discussed with patient the importance to use call light when assistance is needed. Patient verbalized understanding. Patient has bed alarm on, call light within reach, treaded socks on, and hourly rounding in place.     Problem: Knowledge Deficit  Goal: Knowledge of disease process/condition, treatment plan, diagnostic tests, and medications will improve  Outcome: PROGRESSING AS EXPECTED  Pt educated regarding activity, diet, meds and plan of care. Verbalized understanding.

## 2019-04-14 NOTE — PROGRESS NOTES
"   Orthopaedic Progress Note    Interval changes:  Right hip dressing changed CDI  Cleared for DC back to memory care vs SNF by ortho pending medicine clearance    ROS - Patient demented but reports only minor pain    BP (!) 171/94   Pulse 68   Temp 36.3 °C (97.3 °F) (Temporal)   Resp 18   Ht 1.727 m (5' 8\")   Wt 87.5 kg (192 lb 14.4 oz)   SpO2 95%       Patient seen and examined  No acute distress  Breathing non labored  RRR  Right hip urgical dressings CDI. Patient clearly fires tibialis anterior, EHL, and gastrocnemius/soleus. Sensation is intact to light touch throughout superficial peroneal, deep peroneal, tibial, saphenous, and sural nerve distributions. Strong and palpable 2+ dorsalis pedis and posterior tibial pulses with capillary refill less than 2 seconds. No lower leg tenderness or discomfort.        Recent Labs      04/11/19   0740  04/12/19   0240   WBC  7.4  5.7   RBC  3.69*  3.42*   HEMOGLOBIN  11.9*  11.2*   HEMATOCRIT  37.8*  34.4*   MCV  102.4*  100.6*   MCH  32.2  32.7   MCHC  31.5*  32.6*   RDW  50.4*  49.4   PLATELETCT  262  220   MPV  9.1  8.8*       Active Hospital Problems    Diagnosis   • Closed fracture of neck of right femur (HCC) [S72.001A]     Priority: High   • Delirium with dementia [R41.0]     Priority: Medium   • Acute blood loss as cause of postoperative anemia [D62]     Priority: Medium   • Acute cystitis without hematuria [N30.00]     Priority: Medium   • Vitamin D deficiency disease [E55.9]     Priority: Medium   • Late onset Alzheimer's disease with behavioral disturbance [G30.1, F02.81]     Priority: Low   • Coronary artery disease involving native coronary artery of native heart without angina pectoris [I25.10]     Priority: Low       Assessment/Plan:  Cleared for DC by ortho pending medicine clearance  POD#4 S/P Percutaneous screw fixation of right femoral neck fracture  Wt bearing status - WBAT  Wound care/Drains - dressing change every other day by nursing    Future " Procedures - none planned   Lovenox: Start 4/10, Duration-until ambulatory > 150'  Sutures/Staples out-  14 days post operatively  PT/OT-initiated  Antibiotics: completed  DVT Prophylaxis- TEDS/SCDs/Foot pumps  Blum-none  Case Coordination for Discharge Planning - Disposition SNF

## 2019-04-15 PROCEDURE — 99232 SBSQ HOSP IP/OBS MODERATE 35: CPT | Performed by: INTERNAL MEDICINE

## 2019-04-15 PROCEDURE — 700102 HCHG RX REV CODE 250 W/ 637 OVERRIDE(OP): Performed by: INTERNAL MEDICINE

## 2019-04-15 PROCEDURE — 51798 US URINE CAPACITY MEASURE: CPT

## 2019-04-15 PROCEDURE — A9270 NON-COVERED ITEM OR SERVICE: HCPCS | Performed by: INTERNAL MEDICINE

## 2019-04-15 PROCEDURE — 700111 HCHG RX REV CODE 636 W/ 250 OVERRIDE (IP): Performed by: INTERNAL MEDICINE

## 2019-04-15 PROCEDURE — 770001 HCHG ROOM/CARE - MED/SURG/GYN PRIV*

## 2019-04-15 RX ADMIN — ENOXAPARIN SODIUM 40 MG: 100 INJECTION SUBCUTANEOUS at 06:03

## 2019-04-15 RX ADMIN — ESCITALOPRAM OXALATE 20 MG: 10 TABLET ORAL at 06:07

## 2019-04-15 RX ADMIN — TRAZODONE HYDROCHLORIDE 100 MG: 100 TABLET ORAL at 22:04

## 2019-04-15 RX ADMIN — MEMANTINE HYDROCHLORIDE 10 MG: 10 TABLET ORAL at 06:08

## 2019-04-15 RX ADMIN — RANOLAZINE 500 MG: 500 TABLET, FILM COATED, EXTENDED RELEASE ORAL at 17:50

## 2019-04-15 RX ADMIN — SENNOSIDES,DOCUSATE SODIUM 2 TABLET: 8.6; 5 TABLET, FILM COATED ORAL at 06:05

## 2019-04-15 RX ADMIN — QUETIAPINE FUMARATE 75 MG: 25 TABLET ORAL at 06:05

## 2019-04-15 RX ADMIN — DILTIAZEM HYDROCHLORIDE 120 MG: 120 CAPSULE, COATED, EXTENDED RELEASE ORAL at 06:15

## 2019-04-15 RX ADMIN — CALCIUM CARBONATE-CHOLECALCIFEROL TAB 250 MG-125 UNIT 1 TABLET: 250-125 TAB at 06:05

## 2019-04-15 RX ADMIN — MEMANTINE HYDROCHLORIDE 10 MG: 10 TABLET ORAL at 17:50

## 2019-04-15 RX ADMIN — QUETIAPINE FUMARATE 75 MG: 25 TABLET ORAL at 17:50

## 2019-04-15 RX ADMIN — SENNOSIDES,DOCUSATE SODIUM 2 TABLET: 8.6; 5 TABLET, FILM COATED ORAL at 17:49

## 2019-04-15 RX ADMIN — ACETAMINOPHEN 650 MG: 325 TABLET, FILM COATED ORAL at 06:49

## 2019-04-15 RX ADMIN — VITAMIN D, TAB 1000IU (100/BT) 5000 UNITS: 25 TAB at 06:08

## 2019-04-15 RX ADMIN — RANOLAZINE 500 MG: 500 TABLET, FILM COATED, EXTENDED RELEASE ORAL at 06:08

## 2019-04-15 NOTE — CARE PLAN
Problem: Communication  Goal: The ability to communicate needs accurately and effectively will improve  Outcome: PROGRESSING SLOWER THAN EXPECTED    Intervention: [place] patient and significant other/support system to call light to alert staff of needs   04/14/19 7602   OTHER   Oriented to: All of the Following : Location of Bathroom, Visiting Policy, Unit Routine, Call Light and Bedside Controls, Bedside Rail Policy, Smoking Policy, Rights and Responsibilities, Bedside Report, and Patient Education Notebook         Problem: Safety  Goal: Will remain free from injury  Outcome: PROGRESSING AS EXPECTED  Patient educated on the use of the call light. Patient verbalizes understanding and calls appropriately. Bed locked and in lowest position. Hourly rounding in place.

## 2019-04-15 NOTE — PROGRESS NOTES
[facility] Medicine Daily Progress Note    Date of Service  4/15/2019    Chief Complaint  GLF    Hospital Course    85 y.o. male with Alzheimer's dementia with behavioral disturbance, hypertension, admitted 4/8/2019 with right hip pain after he fell.  Initial x-ray were negative, but hip CT revealed an acute comminuted and impacted fracture of the right femoral neck, with thickened urinary bladder. Chest x-ray showed nothing acute.  He was also found to have acute cystitis.  Patient started on pain medications, along with antibiotics for UTI.  She is also started on calcium and vitamin D supplements. Orthopedics was consulted, who recommended surgical intervention. Patient underwent percutaneous screw fixation of the right femoral neck fracture, with an complicated perioperative course.  Vitamin D is significantly low at 8, started on replacement.  Also had postoperative acute blood loss anemia, but remains above transfusion threshold. Orthopedics cleared for weightbearing as tolerated.  He had issues with delirium in setting of his dementia while in the hospital. PT/OT recommending SNF placement, which is pursued.         Interval Problem Update  4/15/2019 - I reviewed the patient's chart. There were no significant overnight events. Remains hemodynamically stable and afebrile. Stable on RA.  Still awaiting SNF placement..    > I have personally seen and examined the patient today.  He is asleep, but easily arousable.  Gets easily agitated.  He does not appear to be in pain.  Refuses to answer questions.      Consultants/Specialty  orthopedics    Code Status  Full    Disposition  Monitor on telemetry. Await SNF placement vs return to memory care if facility can provide his needed skilled therapies there.    Review of Systems  ROS     Unable to obtain due to dementia.        Physical Exam  Temp:  [36.1 °C (96.9 °F)-36.7 °C (98.1 °F)] 36.7 °C (98.1 °F)  Pulse:  [58-74] 64  Resp:  [14-18] 18  BP: (139-171)/(74-94)  158/80  SpO2:  [93 %-96 %] 96 %    Physical Exam   Constitutional: He appears well-developed and well-nourished. No distress.   HENT:   Head: Normocephalic and atraumatic.   Mouth/Throat: Oropharynx is clear and moist. No oropharyngeal exudate.   Eyes: Pupils are equal, round, and reactive to light. EOM are normal. Right eye exhibits no discharge. Left eye exhibits no discharge. No scleral icterus.   Neck: Normal range of motion. Neck supple.   Cardiovascular: Normal rate and regular rhythm.  Exam reveals no gallop and no friction rub.    No murmur heard.  Pulmonary/Chest: Effort normal and breath sounds normal. He has no wheezes. He has no rales. He exhibits no tenderness.   Abdominal: Soft. Bowel sounds are normal. There is no tenderness. There is no rebound and no guarding.   Musculoskeletal: He exhibits no edema.   Minimal tenderness in the right hip.  No obvious swelling.  Dressing CDI.   Lymphadenopathy:     He has no cervical adenopathy.   Neurological: He is alert. No cranial nerve deficit.   + Dementia.  Confused.   Skin: Skin is warm and dry. No rash noted. He is not diaphoretic. No erythema.   Psychiatric:   Unable to assess psych exam   Vitals reviewed.       Fluids  No intake or output data in the 24 hours ending 04/15/19 0745    Laboratory                        Imaging  DX-PORTABLE FLUOROSCOPY < 1 HOUR   Final Result         Portable fluoroscopy utilized for 54 seconds.         DX-HIP-UNILATERAL-WITHOUT PELVIS-1 VIEW RIGHT   Final Result      Digitized intraoperative radiograph is submitted for review.  This examination is not for diagnostic purpose but for guidance during a surgical procedure.      DX-FEMUR-2+ RIGHT   Final Result            Redemonstration of impacted fracture of the right femoral neck.      Postsurgical change from right arthroplasty. No definite distal femur fracture.      DX-CHEST-PORTABLE (1 VIEW)   Final Result         1. No acute cardiopulmonary abnormalities are identified.       CT-HIP W/O PLUS RECONS RIGHT   Final Result         Acute comminuted and impacted fracture of the right femoral neck.      Diffuse wall thickening of the urinary bladder.      DX-SHOULDER 2+ RIGHT   Final Result      There is no evidence of acute fracture.   Mild osteoarthritis.           Assessment/Plan  * Closed fracture of neck of right femur (HCC)- (present on admission)   Assessment & Plan    -s/p surgical fixation.  -Continue calcium + vit D. Will need outpatient bone scan to evaluate for osteoporosis and need for bisphosphonates.   - continue pain control with IV morphine, oral oxycodone.  - Continue pharmacologic DVT prophylaxis while in the hospital. Patient had a high-risk above the knee procedure. Will need on-going DVT prophylaxis on discharge until fully upright and mobile.  - WBAT per ortho.  -Continue PT/OT evaluation while in-house.  CM/SW working on SNF placement.         Acute blood loss as cause of postoperative anemia   Assessment & Plan    -Hemoglobin trended down, but remains above transfusion threshold.  Patient refused further blood draws.     Delirium with dementia   Assessment & Plan    -Continue to monitor for worsening delirium. Continue Frequent re-orientation, reestablish circadian rhythm, encourage familiar faces/family in room, avoid or minimize narcotics/sedatives.  Continue seroquel.  Start PRN Haldol IM for extreme agitation.     Acute cystitis without hematuria- (present on admission)   Assessment & Plan    -Completed 3 days course of IV ceftriaxone.     Vitamin D deficiency disease- (present on admission)   Assessment & Plan    -Continue cholecalciferol 5000 units daily.     Late onset Alzheimer's disease with behavioral disturbance- (present on admission)   Assessment & Plan    - Continue Seroquel, trazodone, Namenda and Lexapro.  - Frequent re-orientation, reestablish circadian rhythm, encourage familiar faces/family in room, avoid or minimize narcotics/sedatives.         Coronary artery disease involving native coronary artery of native heart without angina pectoris- (present on admission)   Assessment & Plan    -Stable.  No chest pain.  No EKG changes.  -Continue medical management.          VTE prophylaxis: lovenox SQ      I have performed the physical examination, and reviewed updated ROS and plan today 4/15/2019.  In review of yesterday's note, there are no new changes except as documented above.

## 2019-04-15 NOTE — DISCHARGE PLANNING
Agency/Facility Name: [facility]  Spoke To: Mayur  Outcome: Facility not on choice form, disregarded by facility.

## 2019-04-15 NOTE — CARE PLAN
Problem: Safety  Goal: Will remain free from injury  Outcome: PROGRESSING AS EXPECTED  Bed locked and lowest position. Bed alarm on. Treaded socks on. Call light and belongings within reach. Pt educated to call for assistance. Pt verbalized understanding. Hourly rounding in place.     Problem: Knowledge Deficit  Goal: Knowledge of disease process/condition, treatment plan, diagnostic tests, and medications will improve  Outcome: PROGRESSING SLOWER THAN EXPECTED  Discussed POC and medications with pt and family, family verbalized understanding.

## 2019-04-15 NOTE — DISCHARGE PLANNING
Agency/Facility Name: [facility]  Spoke To: Fax Transmission  Outcome: Patient declined due to behavior, agitation, high risk cognitive defects.

## 2019-04-15 NOTE — PROGRESS NOTES
· 2 RN skin check complete with Dhara GOMEZ.  · Devices in place SCDs.  · Skin assessed under devices yes.  · Confirmed pressure ulcers found on left hip.  · New potential pressure ulcers noted on none. Wound consult placed and wound reported.  · The following interventions in place waffle mattress, Q2 tuns.     Scab noted to right ear.   Scab noted to right elbow.   Left elbow red, calloused, and blanching.   Sacrum red and blanching.   Pressure ulcer noted to left hip. Mepilex in place.   Surgical sites noted to right hip.   Scab noted to right knee. Barrier in place.   Left medial knee red and blanching. Barrier in place.   Bony prominence/bump noted to left upper lateral shin.   Bilateral heels boggy, red, calloused, and blanching. Floated on pillow.

## 2019-04-15 NOTE — WOUND TEAM
[facility]  Inpatient Services  Initial Wound and Skin Care Evaluation    Admission Date: 4/8/2019     Consult Date: 04/12/2019 @ 2003   HPI, PMH, SH: Reviewed    Unit where seen by Wound Team: [id number]     WOUND CONSULT RELATED TO:  Left Hip, Sacrum and bilateral knees     SUBJECTIVE:  Pt repeatedly mumbles incoherently      Self Report / Pain Level:  Denies pain       OBJECTIVE:  Pt sitting up in chair at bedside, sacral mepilex in use to protect sacrum, left hip and bilateral knees.     WOUND TYPE, LOCATION, CHARACTERISTICS (Pressure Injuries: location, stage, POA or date identified)    Wound 04/11/19 Partial Thickness Wound Hip (Active)   Wound Image      Site Assessment Red;Painful;Fragile;Light purple    [place]-wound Assessment Clean;Dry;Intact    Margins Attached edges;Defined edges    Wound Length (cm) 2 cm    Wound Width (cm) 2.8 cm    Wound Depth (cm) 0.1 cm    Wound Surface Area (cm^2) 5.6 cm^2    Closure Open to air    Drainage Amount None    Non-staged Wound Description Partial thickness    Treatments Cleansed;Site care    Cleansing Normal Saline Irrigation    Dressing Options Mepilex    Dressing Cleansing/Solutions Not Applicable    Dressing Changed Reinforced    Dressing Status Clean;Dry;Intact    Dressing Change Frequency Every 72 hrs    NEXT Dressing Change  04/16/19    NEXT Weekly Photo (Inpatient Only) 04/22/19    WOUND NURSE ONLY - Odor None    WOUND NURSE ONLY - Exposed Structures None    WOUND NURSE ONLY - Tissue Type and Percentage Mix of open red and dusky skin color    WOUND NURSE ONLY - Time Spent with Patient (mins) 60                            ^^ LEFT KNEE ^^                                                   ^^ RIGHT KNEE ^^      ^^ SACROCOCCYGEAL AREA ^^      Vascular:    Dorsal Pedal pulses:  NA  Posterior tib pulses:   NA    KIYA:      NA    Lab Values:    WBC:       WBC   Date/Time Value Ref Range Status   04/12/2019 02:40 AM 5.7 4.8 - 10.8 K/uL Final     A1C:    No  results found for: HBA1C      Culture:   NA    INTERVENTIONS BY WOUND TEAM:  Wound consult placed, this RN attempted to see patient yesterday. Pt was aggressive at the time an this RN was unable to evaluate patient. Wound received call today that pt was pleasant and appropriate for wound assessment. This RN up to the unit, discused with bedside RN. This RN in with bedside CNALinda to assess patient. Bilateral knees assessed. The left knee appears intact, while the right has a nearly healed blood blister, per pt he fell on his knee when he had his GLF, the wound has a mepilex in place to prevent further injury. Measurements and pictures were obtained of both knees. Bedside CNA assisted pt to a standing position. Sacrococcygeal area was assess and revealed intact skin, no pressure injury identified. Picture obtained and sacral mepilex reapplied. Pts left hip was then assessed. The hip has a partial thickness wound with some purple discoloration around the open area. The wound appears to be blanching but it is difficult to assess, pt was found down after a GLF and a sDTI could take up to 10 days to reveal itself so POA sDTI can not be ruled out. At this time measurements and picture were obtained and sacral mepilex was reapplied. Pt was returned to a sitting position.     Dressing selection:  Sacral Mepilex         Interdisciplinary consultation: Patient, Bedside RN (Betty), Bedside CNA (Linda)    EVALUATION: Pt is an elderly gentleman with a history of Alzheimer's with behavioral issues who was admitted with GLF and subsequent right hip fracture for which patient under went percutaneous screw fixation of the right femoral neck. Pt has had a complicated hospital stay but is currently cleared for SNF placement. Unfortunately pt continues to have denials to SNF related to aggressive behavior. Wound team was consulted for bilateral knees which appear intact except for the right which has what appears to be an old  nearly healed blood blister from his fall which currently has a mepilex in place. Pt also has a left hip wound which appears to be a partial thickness wound however there is some purple noted around the wound and therefore it is difficult to rule out pressure injury from fall as a DTI can take up to 10 days to reveal itself.     Factors affecting wound healing: Age, Alzheimer's, Aggressive  Goals: Steady decrease in wound area and depth weekly.    NURSING PLAN OF CARE ORDERS (X):    Dressing changes: See Dressing Care orders:   Skin care: See Skin Care orders: X  Rectal tube care: See Rectal Tube Care orders:   Other orders:    RSKIN: CURRENT (X) ORDERED (O):   Q shift Kade:  X  Q shift pressure point assessments:  X  Pressure redistribution mattress X           GAY          Bariatric GAY         Bariatric foam           Heel float boots          Float Heels off Bed with Pillows X When in bed              Barrier wipes         Barrier Cream         Barrier paste          Sacral silicone dressing X        Silicone O2 tubing X        Anchorfast         Cannula fixation Device (Tender )          Gray Foam Ear protectors O          Trach with Optifoam split foam                 Waffle cushion        Waffle Overlay X        Rectal tube or BMS         Antifungal tx      Interdry          Reposition q 2 hours  X When in bed      Up to chair X       Ambulate  X    PT/OT        Dietician        Diabetes Education      PO X   TF     TPN     NPO   # days   Other        WOUND TEAM PLAN OF CARE (X):   NPWT change 3 x week:        Dressing changes by wound team:       Follow up as needed: X      Other (explain):     Anticipated discharge plans (X):   SNF:  X, Pt will not require wound care upon discharge but will most likely require SNF placement to prevent falls which lead to wounds.           Home Care:           Outpatient Wound Center:            Self Care:            Other:

## 2019-04-15 NOTE — PROGRESS NOTES
Bedside report received. Pt A&O to self. No complaints of pain or SOB. RA. VSS. POC discussed with pt. Pt verbalizes understanding. Call light and belongings within reach. Bed locked and in lowest position. Alarm and fall precautions in place.

## 2019-04-15 NOTE — DIETARY
"Nutrition services: Day 7 of admit.  Joshua Nieves is a 85 y.o. male with admitting DX of Fracture of femoral neck, Dementia    Wound Team RN unable to assess hip pressure wound r/t \"Pt refused and is aggressive\" on 4/14. PO intake variable 0-75%. Spoke with RN who recommended not visiting pt d/t aggression. RN obtained supplement order from MD, trial Boost and milkshake.       Assessment:  Height: 172.7 cm (5' 8\")  Weight: 84.6 kg (186 lb 8.2 oz)  Body mass index is 28.36 kg/m².   Diet/Intake: Regular, 0-75% last 5 days.    Evaluation:   1. 5% weight loss over 5 days, IVF stopped 3 days ago, +2.8L since admit per I/O.  2. LBM: 4/10 on bowel protocol, Emesis x1 (4/14)  3. Labs: Reviewed  4. Meds: Calc/Vit D      Malnutrition Risk: Pt at risk for malnutrition with 5% weight loss over 5 days and variable PO intake.     Recommendations/Plan:  1. Supplements TID with meals   2. Encourage intake of meals  3. Document intake of all meals as % taken in ADL's to provide interdisciplinary communication across all shifts.   4. Monitor weight.  5. Nutrition rep will continue to see patient for ongoing meal and snack preferences.     RD following.        "

## 2019-04-15 NOTE — PROGRESS NOTES
"   Orthopaedic Progress Note    Interval changes:  Right hip dressing CDI  Cleared for DC back to memory care vs SNF by ortho pending medicine clearance    ROS - Patient demented but reports only minor pain    BP (!) 171/91   Pulse 60   Temp 36.1 °C (96.9 °F) (Temporal)   Resp 14   Ht 1.727 m (5' 8\")   Wt 87.6 kg (193 lb 2 oz)   SpO2 94%       Patient seen and examined  No acute distress  Breathing non labored  RRR  Right hip surgical dressings CDI. Patient clearly fires tibialis anterior, EHL, and gastrocnemius/soleus. Sensation is intact to light touch throughout superficial peroneal, deep peroneal, tibial, saphenous, and sural nerve distributions. Strong and palpable 2+ dorsalis pedis and posterior tibial pulses with capillary refill less than 2 seconds. No lower leg tenderness or discomfort.        Recent Labs      04/12/19   0240   WBC  5.7   RBC  3.42*   HEMOGLOBIN  11.2*   HEMATOCRIT  34.4*   MCV  100.6*   MCH  32.7   MCHC  32.6*   RDW  49.4   PLATELETCT  220   MPV  8.8*       Active Hospital Problems    Diagnosis   • Closed fracture of neck of right femur (HCC) [S72.001A]     Priority: High   • Delirium with dementia [R41.0]     Priority: Medium   • Acute blood loss as cause of postoperative anemia [D62]     Priority: Medium   • Acute cystitis without hematuria [N30.00]     Priority: Medium   • Vitamin D deficiency disease [E55.9]     Priority: Medium   • Late onset Alzheimer's disease with behavioral disturbance [G30.1, F02.81]     Priority: Low   • Coronary artery disease involving native coronary artery of native heart without angina pectoris [I25.10]     Priority: Low       Assessment/Plan:  Cleared for DC by ortho pending medicine clearance  POD#5 S/P Percutaneous screw fixation of right femoral neck fracture  Wt bearing status - WBAT  Wound care/Drains - dressing change every other day by nursing    Future Procedures - none planned   Lovenox: Start 4/10, Duration-until ambulatory > " 150'  Sutures/Staples out-  14 days post operatively  PT/OT-initiated  Antibiotics: completed  DVT Prophylaxis- TEDS/SCDs/Foot pumps  Blum-none  Case Coordination for Discharge Planning - Disposition SNF

## 2019-04-15 NOTE — PROGRESS NOTES
· 2 RN skin check complete with JASON Sinha.  · Devices in place SCD.  · Skin assessed under devices yes.  · Confirmed pressure ulcers found on left hip.  · New potential pressure ulcers noted on N/A. Wound consult placed and wound reported.  · The following interventions in place waffle mattress, Q2 tuns.    Scab noted to right ear.   Scab noted to right elbow.   Left elbow red, calloused, and blanching.   Sacrum red and blanching.   Pressure ulcer noted to left hip. Mepilex in place.   Surgical sites noted to right hip.   Scab noted to right knee.   Left medial knee red and blanching.   Bony prominence noted to left lateral shin.   Bilateral heels boggy, red, calloused, and blanching. Floated on pillow.   Skin otherwise intact.

## 2019-04-15 NOTE — DISCHARGE PLANNING
Agency/Facility Name: [facility]  Spoke To: Mayur  Outcome: Attempted to follow up, however no answer. Voicemail was left.

## 2019-04-15 NOTE — PROGRESS NOTES
Report received from previous nurse. Patient encouraged to call for assistance. Call light within reach. Patient up to chair, chair alarm on.

## 2019-04-15 NOTE — DISCHARGE PLANNING
Received Choice form at 1221  Agency/Facility Name: [facility]  Referral sent per Choice form at 2037

## 2019-04-16 VITALS
DIASTOLIC BLOOD PRESSURE: 85 MMHG | HEART RATE: 78 BPM | BODY MASS INDEX: 28.07 KG/M2 | TEMPERATURE: 98.8 F | WEIGHT: 185.19 LBS | OXYGEN SATURATION: 95 % | HEIGHT: 68 IN | RESPIRATION RATE: 17 BRPM | SYSTOLIC BLOOD PRESSURE: 119 MMHG

## 2019-04-16 PROCEDURE — 99239 HOSP IP/OBS DSCHRG MGMT >30: CPT | Performed by: HOSPITALIST

## 2019-04-16 PROCEDURE — 90732 PPSV23 VACC 2 YRS+ SUBQ/IM: CPT | Performed by: HOSPITALIST

## 2019-04-16 PROCEDURE — 700102 HCHG RX REV CODE 250 W/ 637 OVERRIDE(OP): Performed by: INTERNAL MEDICINE

## 2019-04-16 PROCEDURE — A9270 NON-COVERED ITEM OR SERVICE: HCPCS | Performed by: INTERNAL MEDICINE

## 2019-04-16 PROCEDURE — 90471 IMMUNIZATION ADMIN: CPT

## 2019-04-16 PROCEDURE — 3E0234Z INTRODUCTION OF SERUM, TOXOID AND VACCINE INTO MUSCLE, PERCUTANEOUS APPROACH: ICD-10-PCS | Performed by: HOSPITALIST

## 2019-04-16 PROCEDURE — 700111 HCHG RX REV CODE 636 W/ 250 OVERRIDE (IP): Performed by: INTERNAL MEDICINE

## 2019-04-16 PROCEDURE — 700111 HCHG RX REV CODE 636 W/ 250 OVERRIDE (IP): Performed by: HOSPITALIST

## 2019-04-16 RX ORDER — ACETAMINOPHEN 325 MG/1
650 TABLET ORAL EVERY 6 HOURS PRN
Qty: 30 TAB | Refills: 0 | Status: SHIPPED | OUTPATIENT
Start: 2019-04-16 | End: 2019-05-29

## 2019-04-16 RX ADMIN — RANOLAZINE 500 MG: 500 TABLET, FILM COATED, EXTENDED RELEASE ORAL at 06:21

## 2019-04-16 RX ADMIN — CALCIUM CARBONATE-CHOLECALCIFEROL TAB 250 MG-125 UNIT 1 TABLET: 250-125 TAB at 06:21

## 2019-04-16 RX ADMIN — SENNOSIDES,DOCUSATE SODIUM 2 TABLET: 8.6; 5 TABLET, FILM COATED ORAL at 06:21

## 2019-04-16 RX ADMIN — POLYETHYLENE GLYCOL 3350 1 PACKET: 17 POWDER, FOR SOLUTION ORAL at 15:15

## 2019-04-16 RX ADMIN — DILTIAZEM HYDROCHLORIDE 120 MG: 120 CAPSULE, COATED, EXTENDED RELEASE ORAL at 06:21

## 2019-04-16 RX ADMIN — PNEUMOCOCCAL VACCINE POLYVALENT 25 MCG
25; 25; 25; 25; 25; 25; 25; 25; 25; 25; 25; 25; 25; 25; 25; 25; 25; 25; 25; 25; 25; 25; 25 INJECTION, SOLUTION INTRAMUSCULAR; SUBCUTANEOUS at 15:15

## 2019-04-16 RX ADMIN — MEMANTINE HYDROCHLORIDE 10 MG: 10 TABLET ORAL at 06:21

## 2019-04-16 RX ADMIN — QUETIAPINE FUMARATE 75 MG: 25 TABLET ORAL at 06:20

## 2019-04-16 RX ADMIN — ESCITALOPRAM OXALATE 20 MG: 10 TABLET ORAL at 06:20

## 2019-04-16 NOTE — DISCHARGE PLANNING
Anticipated Discharge Disposition: [facility] (SNF)     Action: LCSW informed pt's son, Gerardo of d/c at 3:00PM today. LCSW submitted Cobra to RN.    Barriers to Discharge: None at this time.     Plan: LCSW will continue to assess for further d/c needs.

## 2019-04-16 NOTE — DISCHARGE INSTRUCTIONS
·     Discharge Instructions    Discharged to group home by medical transportation with escort. Discharged via wheelchair, hospital escort: Yes.  Special equipment needed: Not Applicable    Be sure to schedule a follow-up appointment with your primary care doctor or any specialists as instructed.     Discharge Plan:   Diet Plan: Discussed  Activity Level: Discussed  Smoking Cessation Offered: Patient Refused  Confirmed Follow up Appointment: Appointment Scheduled  Confirmed Symptoms Management: Discussed  Medication Reconciliation Updated: Yes  Pneumococcal Vaccine Administered/Refused: Not given - Patient refused pneumococcal vaccine (pt. received earlier this year )  Influenza Vaccine Indication: Not indicated: Previously immunized this influenza season and > 8 years of age    I understand that a diet low in cholesterol, fat, and sodium is recommended for good health. Unless I have been given specific instructions below for another diet, I accept this instruction as my diet prescription.   Other diet: Heart healthy 1:1 feed meds crushed in applesauce supplements    Special Instructions: None    · Is patient discharged on Warfarin / Coumadin?   No     I tried calling the [facility] several times trying several different numbers and there was no answer and therefore I could not speak with anyone at the facility regarding any specific concerns regarding the patient's behavior.  When evaluating the patient at bedside with the patient's son, he appears to be at his baseline.  The patient appears to have chronic dementia with behavioral disturbance.  Discharged home in stable condition.  Recommending continued administration of Seroquel for behavioral disturbance and follow-up with neurology and primary care physician.        Depression / Suicide Risk    As you are discharged from this [facility] facility, it is important to learn how to keep safe from harming yourself.    Recognize the warning  signs:  · Abrupt changes in personality, positive or negative- including increase in energy   · Giving away possessions  · Change in eating patterns- significant weight changes-  positive or negative  · Change in sleeping patterns- unable to sleep or sleeping all the time   · Unwillingness or inability to communicate  · Depression  · Unusual sadness, discouragement and loneliness  · Talk of wanting to die  · Neglect of personal appearance   · Rebelliousness- reckless behavior  · Withdrawal from people/activities they love  · Confusion- inability to concentrate     If you or a loved one observes any of these behaviors or has concerns about self-harm, here's what you can do:  · Talk about it- your feelings and reasons for harming yourself  · Remove any means that you might use to hurt yourself (examples: pills, rope, extension cords, firearm)  · Get professional help from the community (Mental Health, Substance Abuse, psychological counseling)  · Do not be alone:Call your Safe Contact- someone whom you trust who will be there for you.  · Call your local CRISIS HOTLINE 990-9287 or 837-431-3392  · Call your local Children's Mobile Crisis Response Team [place] (599) 849-0987 or www.[employer]  · Call the toll free National Suicide Prevention Hotlines   · National Suicide Prevention Lifeline 714-821-XZHJ (4204)  · National Hope Line Network 800-SUICIDE (088-4126)    Open Reduction, Internal Fixation (ORIF), Generic  Usually, if bones are broken (fractured) and are out of place, unstable, or may become out of place, surgery is needed. This surgery is called an open reduction and internal fixation (ORIF). Open reduction means that the area of the fracture is opened up so the surgeon can see it. Internal fixation means that screws, pins, or fixation devices are used to hold the bone pieces in place.  LET YOUR CAREGIVER KNOW ABOUT:   Allergies.  Medicines taken, including herbs, eyedrops, over-the-counter  medicines, and creams.  Use of steroids (by mouth or creams).  Previous problems with anesthetics or numbing medicines.  History of bleeding or blood problems.  History of blood clots.  Possibility of pregnancy, if this applies.  Previous surgery.  Other health problems.  RISKS AND COMPLICATIONS   All surgery is associated with risks. Some of these risks are:  Excessive bleeding.  Infection.  Imperfect results with loss of joint function.  BEFORE THE PROCEDURE   Usually, surgery is performed shortly after the injury. It is important to provide information to your caregiver after your injury.  AFTER THE PROCEDURE   After surgery, you will be taken to a recovery area where a nurse will monitor your progress. You may have a long, narrow tube(catheter) in the bladder following surgery that helps you pass your water. When awake, stable, taking fluids well, and without complications, you will be returned to your room. You will receive physical therapy and other care. Physical therapy is done until you are doing well and your caregiver feels it is safe for you to go home or to an extended care facility.  Following surgery, the bones may be protected with a cast. The type of casting depends on where the fracture was. Casts are generally left in place for about 5 to 6 weeks. During this time, your caregiver will follow your progress. X-rays may be taken during healing to make sure the bones stay in place.  HOME CARE INSTRUCTIONS   You or your child may resume normal diet and activities as directed or allowed.  Put ice on the injured area.  Put ice in a plastic bag.  Place a towel between the skin and the bag.  Leave the ice on for 15-20 minutes at a time, 3-4 times a day, for the first 2 days following surgery.  Change bandages (dressings) if necessary or as directed.  If given a plaster or fiberglass cast:  Do not try to scratch the skin under the cast using sharp or pointed objects.  Check the skin around the cast every  day. You may put lotion on any red or sore areas.  Keep the cast dry and clean.  Do not put pressure on any part of the cast or splint until it is fully hardened.  The cast or splint can be protected during bathing with a plastic bag. Do not lower the cast or splint into water.  Only take over-the-counter or prescription medicines for pain, discomfort, or fever as directed by your caregiver.  Use crutches as directed and do not exercise the leg unless instructed.  If the bones get out of position (displaced), it may eventually lead to arthritis and lasting disability. Problems can follow even the best of care. Follow the directions of your caregiver.  Follow all instructions given by your caregiver, make and keep follow-up appointments, and use crutches as directed.  SEEK IMMEDIATE MEDICAL CARE IF:   There is redness, swelling, numbness, or increasing pain in the wound.  There is pus coming from the wound.  You or your child has an oral temperature above 102° F (38.9° C), not controlled by medicine.  A bad smell is coming from the wound or dressing.  The wound breaks open (edges not staying together) after stitches (sutures) or staples have been removed.  The skin or nails below the injury turn blue or gray, or feel cold or numb.  There is severe pain under the cast or in the foot.  If there is not a window in the cast for observing the wound, a discharge or minor bleeding may show up as a stain on the outside of the cast. Report these findings to your caregiver.  MAKE SURE YOU:   Understand these instructions.  Will watch your condition.  Will get help right away if you are not doing well or gets worse.  Document Released: 12/29/2007 Document Revised: 03/11/2013 Document Reviewed: 12/05/2008  [employer]® Patient Information ©2014 [employer].

## 2019-04-16 NOTE — DISCHARGE SUMMARY
Discharge Summary    CHIEF COMPLAINT ON ADMISSION  Chief Complaint   Patient presents with   • ALOC     started today       Reason for Admission  EMS     Admission Date  4/8/2019    CODE STATUS  Full Code    HPI & HOSPITAL COURSE  This is a 85 y.o. male here with Alzheimer's dementia with behavioral disturbance, hypertension, admitted 4/8/2019 with right hip pain after GLF.Initial x-ray were negative, but hip CT revealed an acute comminuted and impacted fracture of the right femoral neck, with thickened urinary bladder. Chest x-ray showed nothing acute.  He was also found to have acute cystitis on UA.  Patient started on pain medications, along with antibiotics for UTI.  She is also started on calcium and vitamin D supplements. Orthopedics was consulted, who recommended surgical intervention. Patient underwent percutaneous screw fixation of the right femoral neck fracture, with an complicated perioperative course.  Vitamin D is significantly low at 8, started on replacement.  Also had postoperative acute blood loss anemia, but remains above transfusion threshold. Orthopedics cleared for weightbearing as tolerated.  He was also started on Lovenox therapy and DVT prophylaxis.  Sutures need to be removed 14 days postop.  Dressing is to be changed every other day per nursing. He had issues with delirium in setting of his dementia while in the hospital.  He will be transferred to a SNF facility.       Therefore, he is discharged in good and stable condition to skilled nursing facility.    The patient met 2-midnight criteria for an inpatient stay at the time of discharge.    Discharge Date  4/16    FOLLOW UP ITEMS POST DISCHARGE  Follow up with orthopedics    DISCHARGE DIAGNOSES  Principal Problem:    Closed fracture of neck of right femur (HCC) POA: Yes  Active Problems:    Vitamin D deficiency disease POA: Yes    Acute cystitis without hematuria POA: Yes    Delirium with dementia POA: No    Acute blood loss as cause of  postoperative anemia POA: No    Coronary artery disease involving native coronary artery of native heart without angina pectoris POA: Yes    Late onset Alzheimer's disease with behavioral disturbance POA: Yes  Resolved Problems:    * No resolved hospital problems. *      FOLLOW UP  Future Appointments  Date Time Provider Department Center   5/8/2019 2:00 PM Nima Hsieh M.D. [facility] None   5/15/2019 3:00 PM Sourav Stevenson D.O. [facility] None   6/18/2019 2:45 PM Ced Horton M.D. [facility] None     Sourav Stevenson D.O.  [address] [facility] [address]  [facility] [address]  [phone]    Schedule an appointment as soon as possible for a visit       [facility], Emergency Dept  [address] [facility] [address]  [phone]    As needed, If symptoms worsen      MEDICATIONS ON DISCHARGE     Medication List      START taking these medications      Instructions   acetaminophen 325 MG Tabs  Commonly known as:  TYLENOL   Take 2 Tabs by mouth every 6 hours as needed (Mild Pain; (Pain scale 1-3); Temp greater than 100.5 F).  Dose:  650 mg     Cholecalciferol 5000 units Tabs  Start taking on:  4/17/2019   Take 5,000 Units by mouth every day.  Dose:  5000 Units     enoxaparin 40 MG/0.4ML Soln inj  Start taking on:  4/17/2019  Commonly known as:  LOVENOX   Inject 40 mg as instructed every day.  Dose:  40 mg     oyster shell calcium/vitamin D 250-125 MG-UNIT Tabs tablet  Start taking on:  4/17/2019   Take 1 Tab by mouth every day.  Dose:  1 Tab        CONTINUE taking these medications      Instructions   DILTIAZem  MG Cp24  Commonly known as:  DILTIAZEM CD   Take 1 Cap by mouth every day.  Dose:  120 mg     escitalopram 20 MG tablet  Commonly known as:  LEXAPRO   TAKE 1 TAB BY MOUTH EVERYDAY.     memantine 10 MG Tabs  Commonly known as:  NAMENDA   TAKE 1 TAB BY MOUTH 2 TIMES A DAY.  Dose:  10 mg     QUEtiapine 25 MG Tabs  Commonly known as:  SEROQUEL   Take 3 Tabs by mouth 2 times a  day.  Dose:  75 mg     RANEXA 500 MG Tb12  Generic drug:  ranolazine   TAKE 1 TABLET BY MOUTH TWICE DAILY     traZODone 100 MG Tabs  Commonly known as:  DESYREL   TAKE ONE TABLET BY MOUTH AT BEDTIME            Allergies  Allergies   Allergen Reactions   • Spironolactone Diarrhea     Severe diarrhea       DIET  Orders Placed This Encounter   Procedures   • Diet Order Regular     Standing Status:   Standing     Number of Occurrences:   1     Order Specific Question:   Diet:     Answer:   Regular [1]       ACTIVITY  As tolerated and directed by skilled nursing.  Weight bearing as tolerated    CONSULTATIONS  Orthopedics    PROCEDURES  Percutaneous screw fixation of right femoral neck fracture 4/9    LABORATORY  Lab Results   Component Value Date    SODIUM 140 04/11/2019    POTASSIUM 4.0 04/11/2019    CHLORIDE 110 04/11/2019    CO2 22 04/11/2019    GLUCOSE 83 04/11/2019    BUN 17 04/11/2019    CREATININE 1.29 04/11/2019        Lab Results   Component Value Date    WBC 5.7 04/12/2019    HEMOGLOBIN 11.2 (L) 04/12/2019    HEMATOCRIT 34.4 (L) 04/12/2019    PLATELETCT 220 04/12/2019        Total time of the discharge process exceeds 40 minutes.

## 2019-04-16 NOTE — PROGRESS NOTES
Assumed care at 1900, bedside report received from day shift RN. Pt. Is medical. Initial assessment completed, orders reviewed, call light within reach, bed alarm is in use, and hourly rounding in place. POC addressed with patient, no additional questions at this time.

## 2019-04-16 NOTE — DISCHARGE PLANNING
Anticipated Discharge Disposition: [facility] (SNF)    Action: LCSW informed by XIANG Aye that pt was accepted to [facility] and she is setting up transport. LCSW tiger texted MD to identify if pt is medically clear to d/c today.     Barriers to Discharge: Medical Clearance.    Plan: LCSW will f/u with MD and continue to assess for further d/c needs.      Update: Pt is medically clear. LCSW will set up transportation to [facility].

## 2019-04-16 NOTE — PROGRESS NOTES
Pt confused, d/dana to [facility] with transporter Haley Neri, copy of ID on pt chart. Belongings sent with pt.

## 2019-04-16 NOTE — DISCHARGE PLANNING
Agency/Facility Name: [facility]  Spoke To: Samir  Outcome: They are accepting and have one bed available, he will call this [facility] when transport is set up.

## 2019-04-16 NOTE — PROGRESS NOTES
2 RN skin check complete with JASON Colon    · Devices in place none  · Skin assessed under devices n/a  · Confirmed wounds found on skin tear L hip, ORIF R hip with dressing  · New potential wounds noted on none  · Wound consult: no  · Wound reported and pictures uploaded: no    L hip skin tear. Mepilex in place  R hip ORIF with dressing in place  R knee 2 scabs  BL heels pink and blanching    · The following interventions in place: mepilex on L hip. Gauze/dressing over ORIF

## 2019-04-16 NOTE — DISCHARGE PLANNING
Received Transport Form @ 1126  Spoke to Samir @ [facility]    Transport is scheduled for 4/16 @1500 going to [facility].

## 2019-04-16 NOTE — PROGRESS NOTES
Bedside report received with RN. Pt care assumed, assessment completed. Patient is A&Ox1 to self. Bed is in low, locked position, treaded socks on. Call light and belongings within reach.

## 2019-04-16 NOTE — DISCHARGE PLANNING
Anticipated Discharge Disposition: SNF    Action: LCSW spoke to sonGerardo about SNF choice. [facility] [place]. LCSW faxed SNF choice form to XIANG Argueta.    Barriers to Discharge: Medical clearance/SNF acceptance.    Plan: LCSW will follow up with [facility] and continue to follow for any further needs.

## 2019-04-16 NOTE — DISCHARGE PLANNING
Agency/Facility Name: [facility]  Spoke To: Samir  Outcome: Needed to have the updated PT notes faxed over. Done    Agency/Facility Name: [facility]  Spoke To: Nirmala  Outcome: They do not have any open beds at this time.

## 2019-04-16 NOTE — PROGRESS NOTES
"   Orthopaedic Progress Note    Interval changes:  Right hip dressing CDI  Cleared for DC back to memory care vs SNF by ortho pending medicine clearance    ROS - Patient demented but reports only minor pain    /67   Pulse 72   Temp 36.3 °C (97.3 °F) (Temporal)   Resp 16   Ht 1.727 m (5' 8\")   Wt 84 kg (185 lb 3 oz)   SpO2 90%       Patient seen and examined  No acute distress  Breathing non labored  RRR  Right hip surgical dressings CDI. Patient clearly fires tibialis anterior, EHL, and gastrocnemius/soleus. Sensation is intact to light touch throughout superficial peroneal, deep peroneal, tibial, saphenous, and sural nerve distributions. Strong and palpable 2+ dorsalis pedis and posterior tibial pulses with capillary refill less than 2 seconds. No lower leg tenderness or discomfort.           Active Hospital Problems    Diagnosis   • Closed fracture of neck of right femur (HCC) [S72.001A]     Priority: High   • Delirium with dementia [R41.0]     Priority: Medium   • Acute blood loss as cause of postoperative anemia [D62]     Priority: Medium   • Acute cystitis without hematuria [N30.00]     Priority: Medium   • Vitamin D deficiency disease [E55.9]     Priority: Medium   • Late onset Alzheimer's disease with behavioral disturbance [G30.1, F02.81]     Priority: Low   • Coronary artery disease involving native coronary artery of native heart without angina pectoris [I25.10]     Priority: Low       Assessment/Plan:  Cleared for DC by ortho pending medicine clearance  POD#6 S/P Percutaneous screw fixation of right femoral neck fracture  Wt bearing status - WBAT  Wound care/Drains - dressing change every other day by nursing    Future Procedures - none planned   Lovenox: Start 4/10, Duration-until ambulatory > 150'  Sutures/Staples out-  14 days post operatively  PT/OT-initiated  Antibiotics: completed  DVT Prophylaxis- TEDS/SCDs/Foot pumps  Blum-none  Case Coordination for Discharge Planning - Disposition " SNF

## 2019-05-14 ENCOUNTER — PATIENT OUTREACH (OUTPATIENT)
Dept: HEALTH INFORMATION MANAGEMENT | Facility: OTHER | Age: 84
End: 2019-05-14

## 2019-05-15 ENCOUNTER — PATIENT OUTREACH (OUTPATIENT)
Dept: HEALTH INFORMATION MANAGEMENT | Facility: OTHER | Age: 84
End: 2019-05-15

## 2019-05-17 ENCOUNTER — TELEPHONE (OUTPATIENT)
Dept: MEDICAL GROUP | Facility: LAB | Age: 84
End: 2019-05-17

## 2019-05-17 NOTE — PROGRESS NOTES
T/C to Ondina Jimenez RN patient aggitated cause is unknown recommended ER they are to notify the son!  Regards, Sourav Stevenson,

## 2019-05-17 NOTE — TELEPHONE ENCOUNTER
Called to the phone by office staff, concerns about the agitated state of Joshua.  Because of advanced age and other medical issues recommended emergency room.  Tried to talk to the patient directly on the phone he was totally unintelligible!  Regards, Sourav Stevenson, DO

## 2019-05-17 NOTE — TELEPHONE ENCOUNTER
140/82.  Joshua's blood pressure came down, he calmed down, ate lunch.  They did not take him to ER.

## 2019-05-17 NOTE — TELEPHONE ENCOUNTER
Kamini at [place].  [phone]      Joshua's blood pressures are very high, he is super agitated   198/129 left; 127/110 right.  He's been back at [facility]'s for a week after being at [facility] for [facility] weeks.  Please advise!!

## 2019-05-29 ENCOUNTER — HOSPITAL ENCOUNTER (EMERGENCY)
Facility: MEDICAL CENTER | Age: 84
End: 2019-05-29
Attending: EMERGENCY MEDICINE
Payer: MEDICARE

## 2019-05-29 ENCOUNTER — APPOINTMENT (OUTPATIENT)
Dept: RADIOLOGY | Facility: MEDICAL CENTER | Age: 84
End: 2019-05-29
Attending: EMERGENCY MEDICINE
Payer: MEDICARE

## 2019-05-29 VITALS
WEIGHT: 180.89 LBS | TEMPERATURE: 96.6 F | HEIGHT: 68 IN | OXYGEN SATURATION: 94 % | BODY MASS INDEX: 27.41 KG/M2 | HEART RATE: 78 BPM | DIASTOLIC BLOOD PRESSURE: 48 MMHG | RESPIRATION RATE: 13 BRPM | SYSTOLIC BLOOD PRESSURE: 91 MMHG

## 2019-05-29 DIAGNOSIS — F03.918 DEMENTIA WITH AGGRESSIVE BEHAVIOR (HCC): ICD-10-CM

## 2019-05-29 LAB
ABO + RH BLD: NORMAL
ABO GROUP BLD: NORMAL
ALBUMIN SERPL BCP-MCNC: 3.7 G/DL (ref 3.2–4.9)
ALBUMIN/GLOB SERPL: 1.4 G/DL
ALP SERPL-CCNC: 74 U/L (ref 30–99)
ALT SERPL-CCNC: 10 U/L (ref 2–50)
AMMONIA PLAS-SCNC: 26 UMOL/L (ref 11–45)
AMPHET UR QL SCN: NEGATIVE
ANION GAP SERPL CALC-SCNC: 9 MMOL/L (ref 0–11.9)
APPEARANCE UR: CLEAR
APTT PPP: 26.7 SEC (ref 24.7–36)
AST SERPL-CCNC: 14 U/L (ref 12–45)
BARBITURATES UR QL SCN: NEGATIVE
BASOPHILS # BLD AUTO: 0.7 % (ref 0–1.8)
BASOPHILS # BLD: 0.06 K/UL (ref 0–0.12)
BENZODIAZ UR QL SCN: NEGATIVE
BILIRUB SERPL-MCNC: 0.5 MG/DL (ref 0.1–1.5)
BILIRUB UR QL STRIP.AUTO: NEGATIVE
BLD GP AB SCN SERPL QL: NORMAL
BNP SERPL-MCNC: 38 PG/ML (ref 0–100)
BUN SERPL-MCNC: 15 MG/DL (ref 8–22)
BZE UR QL SCN: NEGATIVE
CALCIUM SERPL-MCNC: 9.1 MG/DL (ref 8.5–10.5)
CANNABINOIDS UR QL SCN: NEGATIVE
CHLORIDE SERPL-SCNC: 105 MMOL/L (ref 96–112)
CO2 SERPL-SCNC: 25 MMOL/L (ref 20–33)
COLOR UR: YELLOW
CREAT SERPL-MCNC: 1.37 MG/DL (ref 0.5–1.4)
EKG IMPRESSION: NORMAL
EOSINOPHIL # BLD AUTO: 0.13 K/UL (ref 0–0.51)
EOSINOPHIL NFR BLD: 1.6 % (ref 0–6.9)
ERYTHROCYTE [DISTWIDTH] IN BLOOD BY AUTOMATED COUNT: 55.9 FL (ref 35.9–50)
GLOBULIN SER CALC-MCNC: 2.7 G/DL (ref 1.9–3.5)
GLUCOSE SERPL-MCNC: 116 MG/DL (ref 65–99)
GLUCOSE UR STRIP.AUTO-MCNC: NEGATIVE MG/DL
HCT VFR BLD AUTO: 38.2 % (ref 42–52)
HGB BLD-MCNC: 12.3 G/DL (ref 14–18)
IMM GRANULOCYTES # BLD AUTO: 0.03 K/UL (ref 0–0.11)
IMM GRANULOCYTES NFR BLD AUTO: 0.4 % (ref 0–0.9)
INR PPP: 1.14 (ref 0.87–1.13)
KETONES UR STRIP.AUTO-MCNC: NEGATIVE MG/DL
LEUKOCYTE ESTERASE UR QL STRIP.AUTO: NEGATIVE
LIPASE SERPL-CCNC: 8 U/L (ref 11–82)
LYMPHOCYTES # BLD AUTO: 0.85 K/UL (ref 1–4.8)
LYMPHOCYTES NFR BLD: 10.4 % (ref 22–41)
MCH RBC QN AUTO: 33 PG (ref 27–33)
MCHC RBC AUTO-ENTMCNC: 32.2 G/DL (ref 33.7–35.3)
MCV RBC AUTO: 102.4 FL (ref 81.4–97.8)
METHADONE UR QL SCN: NEGATIVE
MICRO URNS: NORMAL
MONOCYTES # BLD AUTO: 0.82 K/UL (ref 0–0.85)
MONOCYTES NFR BLD AUTO: 10 % (ref 0–13.4)
NEUTROPHILS # BLD AUTO: 6.3 K/UL (ref 1.82–7.42)
NEUTROPHILS NFR BLD: 76.9 % (ref 44–72)
NITRITE UR QL STRIP.AUTO: NEGATIVE
NRBC # BLD AUTO: 0 K/UL
NRBC BLD-RTO: 0 /100 WBC
OPIATES UR QL SCN: NEGATIVE
OXYCODONE UR QL SCN: NEGATIVE
PCP UR QL SCN: NEGATIVE
PH UR STRIP.AUTO: 5 [PH]
PLATELET # BLD AUTO: 218 K/UL (ref 164–446)
PMV BLD AUTO: 9.3 FL (ref 9–12.9)
POTASSIUM SERPL-SCNC: 4.3 MMOL/L (ref 3.6–5.5)
PROPOXYPH UR QL SCN: NEGATIVE
PROT SERPL-MCNC: 6.4 G/DL (ref 6–8.2)
PROT UR QL STRIP: NEGATIVE MG/DL
PROTHROMBIN TIME: 14.7 SEC (ref 12–14.6)
RBC # BLD AUTO: 3.73 M/UL (ref 4.7–6.1)
RBC UR QL AUTO: NEGATIVE
RH BLD: NORMAL
SODIUM SERPL-SCNC: 139 MMOL/L (ref 135–145)
SP GR UR STRIP.AUTO: 1.04
TROPONIN I SERPL-MCNC: 0.04 NG/ML (ref 0–0.04)
TROPONIN I SERPL-MCNC: 0.05 NG/ML (ref 0–0.04)
UROBILINOGEN UR STRIP.AUTO-MCNC: 0.2 MG/DL
WBC # BLD AUTO: 8.2 K/UL (ref 4.8–10.8)

## 2019-05-29 PROCEDURE — 700117 HCHG RX CONTRAST REV CODE 255: Performed by: EMERGENCY MEDICINE

## 2019-05-29 PROCEDURE — 71045 X-RAY EXAM CHEST 1 VIEW: CPT

## 2019-05-29 PROCEDURE — 81003 URINALYSIS AUTO W/O SCOPE: CPT

## 2019-05-29 PROCEDURE — 74177 CT ABD & PELVIS W/CONTRAST: CPT

## 2019-05-29 PROCEDURE — 70450 CT HEAD/BRAIN W/O DYE: CPT

## 2019-05-29 PROCEDURE — 80307 DRUG TEST PRSMV CHEM ANLYZR: CPT

## 2019-05-29 PROCEDURE — 83880 ASSAY OF NATRIURETIC PEPTIDE: CPT

## 2019-05-29 PROCEDURE — 85025 COMPLETE CBC W/AUTO DIFF WBC: CPT

## 2019-05-29 PROCEDURE — 99285 EMERGENCY DEPT VISIT HI MDM: CPT

## 2019-05-29 PROCEDURE — 36415 COLL VENOUS BLD VENIPUNCTURE: CPT

## 2019-05-29 PROCEDURE — 93005 ELECTROCARDIOGRAM TRACING: CPT | Performed by: EMERGENCY MEDICINE

## 2019-05-29 PROCEDURE — 80053 COMPREHEN METABOLIC PANEL: CPT

## 2019-05-29 PROCEDURE — 700105 HCHG RX REV CODE 258: Performed by: EMERGENCY MEDICINE

## 2019-05-29 PROCEDURE — 93005 ELECTROCARDIOGRAM TRACING: CPT

## 2019-05-29 PROCEDURE — 84484 ASSAY OF TROPONIN QUANT: CPT | Mod: 91

## 2019-05-29 PROCEDURE — 86900 BLOOD TYPING SEROLOGIC ABO: CPT

## 2019-05-29 PROCEDURE — 700111 HCHG RX REV CODE 636 W/ 250 OVERRIDE (IP): Performed by: EMERGENCY MEDICINE

## 2019-05-29 PROCEDURE — 96374 THER/PROPH/DIAG INJ IV PUSH: CPT

## 2019-05-29 PROCEDURE — 85610 PROTHROMBIN TIME: CPT

## 2019-05-29 PROCEDURE — 86901 BLOOD TYPING SEROLOGIC RH(D): CPT

## 2019-05-29 PROCEDURE — 700111 HCHG RX REV CODE 636 W/ 250 OVERRIDE (IP)

## 2019-05-29 PROCEDURE — 86850 RBC ANTIBODY SCREEN: CPT

## 2019-05-29 PROCEDURE — 83690 ASSAY OF LIPASE: CPT

## 2019-05-29 PROCEDURE — 85730 THROMBOPLASTIN TIME PARTIAL: CPT

## 2019-05-29 PROCEDURE — 96376 TX/PRO/DX INJ SAME DRUG ADON: CPT

## 2019-05-29 PROCEDURE — 82140 ASSAY OF AMMONIA: CPT

## 2019-05-29 RX ORDER — LORAZEPAM 2 MG/ML
0.5 INJECTION INTRAMUSCULAR ONCE
Status: COMPLETED | OUTPATIENT
Start: 2019-05-29 | End: 2019-05-29

## 2019-05-29 RX ORDER — CHOLECALCIFEROL (VITAMIN D3) 50 MCG
2000 TABLET ORAL DAILY
COMMUNITY

## 2019-05-29 RX ORDER — SODIUM CHLORIDE 9 MG/ML
1000 INJECTION, SOLUTION INTRAVENOUS ONCE
Status: COMPLETED | OUTPATIENT
Start: 2019-05-29 | End: 2019-05-29

## 2019-05-29 RX ORDER — LORAZEPAM 2 MG/ML
1 INJECTION INTRAMUSCULAR ONCE
Status: DISCONTINUED | OUTPATIENT
Start: 2019-05-29 | End: 2019-05-29

## 2019-05-29 RX ORDER — ACETAMINOPHEN 500 MG
500 TABLET ORAL EVERY 6 HOURS PRN
COMMUNITY

## 2019-05-29 RX ADMIN — IOHEXOL 100 ML: 350 INJECTION, SOLUTION INTRAVENOUS at 14:33

## 2019-05-29 RX ADMIN — LORAZEPAM 0.5 MG: 2 INJECTION, SOLUTION INTRAMUSCULAR; INTRAVENOUS at 13:26

## 2019-05-29 RX ADMIN — SODIUM CHLORIDE 1000 ML: 9 INJECTION, SOLUTION INTRAVENOUS at 13:23

## 2019-05-29 RX ADMIN — LORAZEPAM 0.5 MG: 2 INJECTION INTRAMUSCULAR; INTRAVENOUS at 17:30

## 2019-05-29 NOTE — ED TRIAGE NOTES
"Joshua Nieves  86 y.o. male  Chief Complaint   Patient presents with   • ALOC     Pt brought in from [facility]. Per EMS, pt was baseline A&Ox0 earlier today. The facility reports pt became altered, and aggitated at ~11:15a.      Pt BIB EMS for above CC.   Pt arrived soiled. Pt changed into gown and new sheets. Pt continues to scream unrecognizable words. Equal strength bilaterally.     Hx: Dementia    Blood Pressure : (!) 91/48, Pulse: 67, Respiration: 17, Temperature: 35.9 °C (96.6 °F), Height: 172.7 cm (5' 8\"), Weight: 82.1 kg (180 lb 14.2 oz), BMI (Calculated): 27.5, BSA (Calculated): 2, Pulse Oximetry: 95 %, O2 (LPM): 0, O2 Delivery: None (Room Air)    "

## 2019-05-29 NOTE — ED NOTES
Med rec updated and complete . Allergies reviewed. Per   [place]  [facility]. No oral antibiotics noted.

## 2019-05-29 NOTE — ED NOTES
Second PIV established with assistance from second Rn and Tech.   Blood drawn and tubed to lab on ice.

## 2019-05-29 NOTE — ED PROVIDER NOTES
ED Provider Note    Scribed for Silvana Beard M.D. by Luis Daniel Claire. 5/29/2019, 12:47 PM.    Primary care provider: Sourav Stevenson D.O.  Means of arrival: EMS  History obtained from: EMS  History limited by: ALOC    CHIEF COMPLAINT  Chief Complaint   Patient presents with   • ALOC     Pt brought in from [facility]. Per EMS, pt was baseline A&Ox0 earlier today. The facility reports pt became altered, and aggitated at ~11:15a.      HPI  Joshua Nieves is a 86 y.o. male who presents to the Emergency Department with an altered level of consciousness. Patients history is limited therefore. Patient comes from [facility] and is unclear as to why the patient has come to the ED. Patient verbalizes abdominal pain which he states he has all the time.     REVIEW OF SYSTEMS  ROS limited by: ALOC.     PAST MEDICAL HISTORY  Past Medical History:   Diagnosis Date   • Abnormal CBC 8/4/2017   • HTN (hypertension) 04/09/2019   • Late onset Alzheimer's disease with behavioral disturbance 1/31/2018   • Vitamin D deficiency disease 8/4/2017       SURGICAL HISTORY  Past Surgical History:   Procedure Laterality Date   • HIP CANNULATED SCREW Right 4/9/2019    Procedure: FIXATION, HIP, USING CANNULATED SCREW;  Surgeon: Rufus Parrish M.D.;  Location: SURGERY [facility];  Service: Orthopedics     SOCIAL HISTORY  Social History   Substance Use Topics   • Smoking status: Former Smoker     Quit date: 1/1/1987   • Smokeless tobacco: Never Used   • Alcohol use No      History   Drug Use No     CURRENT MEDICATIONS  Home Medications     Reviewed by Abhay Jarquin (Pharmacy Tech) on 05/29/19 at 1601  Med List Status: Complete   Medication Last Dose Status   acetaminophen (TYLENOL) 500 MG Tab 5/28/2019 Active   Cholecalciferol (VITAMIN D) 2000 UNIT Tab 5/29/2019 Active   DILTIAZem CD (DILTIAZEM CD) 120 MG CAPSULE SR 24 HR 5/29/2019 Active   escitalopram (LEXAPRO) 20 MG tablet  "5/29/2019 Active   memantine (NAMENDA) 10 MG Tab 5/29/2019 Active   QUEtiapine (SEROQUEL) 25 MG Tab 5/29/2019 Active   RANEXA 500 MG TABLET SR 12 HR 5/29/2019 Active   traZODone (DESYREL) 100 MG Tab 5/28/2019 Active                ALLERGIES  Allergies   Allergen Reactions   • Spironolactone Diarrhea     Severe diarrhea       PHYSICAL EXAM  VITAL SIGNS: BP (!) 91/48   Pulse 67   Temp 35.9 °C (96.6 °F) (Temporal)   Resp 17   Ht 1.727 m (5' 8\")   Wt 82.1 kg (180 lb 14.2 oz)   SpO2 95%   BMI 27.50 kg/m²   Vitals reviewed.    Consitutional: soiled and generally unkempt. Well-developed, well-nourished. Negative for: distress.   HENT: Normocephalic, right external ear normal, left external ear normal, Dry mucous membranes.  Eyes: Conjunctivae normal, extraocular movements normal. PERRL at 3 mm, Negative for: discharge in right and left eye, icterus.  Neck: Range of motion normal, supple. Negative for cervical adenopathy.  Cardiovascular: Normal rate, regular rhythm, heart sounds normal, intact distal pulses. Negative for: murmur, rub, gallop.  Pulmonary/Chest Wall: Effort normal, breath sounds normal. Negative for: respiratory distress, rales, rhonchi. Wheezing cleared by cough  Abdominal: Soft, bowel sounds normal. Negative for: distention, tenderness, rebound, guarding. Guarding to exam but nothing grossly obvious, Large ventral and umbilical hernias, appear tender but soft and reducible  Musculoskeletal: Limited range of motion bilateral hips. Large edema to the right lower leg ankle and foot, erythematous to the medial side.   Neurological: Intermittently stuporous then alert and oriented x0 unable to cooperate with a full exam  Skin: Warm, dry. Negative for rash.  Psych: Somnolent then extremely agitated and combative.  Poor judgment    DIAGNOSTIC STUDIES / PROCEDURES    LABS  Results for orders placed or performed during the hospital encounter of 05/29/19   CBC WITH DIFFERENTIAL   Result Value Ref Range    WBC " 8.2 4.8 - 10.8 K/uL    RBC 3.73 (L) 4.70 - 6.10 M/uL    Hemoglobin 12.3 (L) 14.0 - 18.0 g/dL    Hematocrit 38.2 (L) 42.0 - 52.0 %    .4 (H) 81.4 - 97.8 fL    MCH 33.0 27.0 - 33.0 pg    MCHC 32.2 (L) 33.7 - 35.3 g/dL    RDW 55.9 (H) 35.9 - 50.0 fL    Platelet Count 218 164 - 446 K/uL    MPV 9.3 9.0 - 12.9 fL    Neutrophils-Polys 76.90 (H) 44.00 - 72.00 %    Lymphocytes 10.40 (L) 22.00 - 41.00 %    Monocytes 10.00 0.00 - 13.40 %    Eosinophils 1.60 0.00 - 6.90 %    Basophils 0.70 0.00 - 1.80 %    Immature Granulocytes 0.40 0.00 - 0.90 %    Nucleated RBC 0.00 /100 WBC    Neutrophils (Absolute) 6.30 1.82 - 7.42 K/uL    Lymphs (Absolute) 0.85 (L) 1.00 - 4.80 K/uL    Monos (Absolute) 0.82 0.00 - 0.85 K/uL    Eos (Absolute) 0.13 0.00 - 0.51 K/uL    Baso (Absolute) 0.06 0.00 - 0.12 K/uL    Immature Granulocytes (abs) 0.03 0.00 - 0.11 K/uL    NRBC (Absolute) 0.00 K/uL   COMP METABOLIC PANEL   Result Value Ref Range    Sodium 139 135 - 145 mmol/L    Potassium 4.3 3.6 - 5.5 mmol/L    Chloride 105 96 - 112 mmol/L    Co2 25 20 - 33 mmol/L    Anion Gap 9.0 0.0 - 11.9    Glucose 116 (H) 65 - 99 mg/dL    Bun 15 8 - 22 mg/dL    Creatinine 1.37 0.50 - 1.40 mg/dL    Calcium 9.1 8.5 - 10.5 mg/dL    AST(SGOT) 14 12 - 45 U/L    ALT(SGPT) 10 2 - 50 U/L    Alkaline Phosphatase 74 30 - 99 U/L    Total Bilirubin 0.5 0.1 - 1.5 mg/dL    Albumin 3.7 3.2 - 4.9 g/dL    Total Protein 6.4 6.0 - 8.2 g/dL    Globulin 2.7 1.9 - 3.5 g/dL    A-G Ratio 1.4 g/dL   LIPASE   Result Value Ref Range    Lipase 8 (L) 11 - 82 U/L   TROPONIN   Result Value Ref Range    Troponin I 0.05 (H) 0.00 - 0.04 ng/mL   BTYPE NATRIURETIC PEPTIDE   Result Value Ref Range    B Natriuretic Peptide 38 0 - 100 pg/mL   URINALYSIS (UA)   Result Value Ref Range    Color Yellow     Character Clear     Specific Gravity 1.042 <1.035    Ph 5.0 5.0 - 8.0    Glucose Negative Negative mg/dL    Ketones Negative Negative mg/dL    Protein Negative Negative mg/dL    Bilirubin Negative  Negative    Urobilinogen, Urine 0.2 Negative    Nitrite Negative Negative    Leukocyte Esterase Negative Negative    Occult Blood Negative Negative    Micro Urine Req see below    URINE DRUG SCREEN   Result Value Ref Range    Amphetamines Urine Negative Negative    Barbiturates Negative Negative    Benzodiazepines Negative Negative    Cocaine Metabolite Negative Negative    Methadone Negative Negative    Opiates Negative Negative    Oxycodone Negative Negative    Phencyclidine -Pcp Negative Negative    Propoxyphene Negative Negative    Cannabinoid Metab Negative Negative   PROTHROMBIN TIME (INR)   Result Value Ref Range    PT 14.7 (H) 12.0 - 14.6 sec    INR 1.14 (H) 0.87 - 1.13   APTT   Result Value Ref Range    APTT 26.7 24.7 - 36.0 sec   AMMONIA   Result Value Ref Range    Ammonia 26 11 - 45 umol/L   COD (ADULT)   Result Value Ref Range    ABO Grouping Only A     Rh Grouping Only NEG     Antibody Screen-Cod NEG    ESTIMATED GFR   Result Value Ref Range    GFR If African American 60 >60 mL/min/1.73 m 2    GFR If Non African American 49 (A) >60 mL/min/1.73 m 2   ABO Rh Confirm   Result Value Ref Range    ABO Rh Confirm A NEG    TROPONIN   Result Value Ref Range    Troponin I 0.04 0.00 - 0.04 ng/mL   EKG   Result Value Ref Range    Report       [facility] Emergency Dept.    Test Date:  2019  Pt Name:    JEN ECHOLS               Department: ER  MRN:        [id number]                      Room:        17  Gender:     Male                         Technician: [id number]  :        1933                   Requested By:ER TRIAGE PROTOCOL  Order #:    [id number]                    Reading MD:    Measurements  Intervals                                Axis  Rate:       78                           P:          76  MT:         225                          QRS:        -17  QRSD:       104                          T:          32  QT:         440  QTc:        502    Interpretive Statements  SINUS  RHYTHM  ABERRANT COMPLEX, POSSIBLY SUPRAVENTRICULAR  FIRST DEGREE AV BLOCK  CONSIDER RIGHT ATRIAL ABNORMALITY  BORDERLINE LEFT AXIS DEVIATION  RSR' IN V1 OR V2, PROBABLY NORMAL VARIANT  CONSIDER ANTERIOR INFARCT  PROLONGED QT INTERVAL  BASELINE WANDER IN LEAD(S) V6  Compared to ECG 04/08/2019 22:25:33  Aberrant conduc tion of supraventricular beat(s) now present  RSR' in V1 or V2 now present  Myocardial infarct finding now present  Prolonged QT interval now present        All labs reviewed by me.    EKG Interpretation:  12 lead EKG interpretation by me as above.  No ST or T wave changes to indicate ischemia or infarct    RADIOLOGY  CT-ABDOMEN-PELVIS WITH   Final Result      1.  No acute findings.   2.  Postoperative changes as described.   3.  Probable stable liver and kidney cysts.   4.  Colonic diverticulosis without evidence for diverticulitis.   5.  Normal appendix.      CT-HEAD W/O   Final Result      1.  Diffuse atrophy and white matter changes.   2.  No acute intracranial hemorrhage or territorial infarct.   3.  Chronic paranasal sinus disease.   4.  Motion artifact limits exam.         DX-CHEST-PORTABLE (1 VIEW)   Final Result      Hypoinflation without other evidence for acute cardiopulmonary disease.        The radiologist's interpretation of all radiological studies have been reviewed by me.    COURSE & MEDICAL DECISION MAKING  Nursing notes, VS, PMSFHx reviewed in chart.    Obtained and reviewed past medical records from 4/8/19 which indicated patient with history of dementia and behavioral aggression. Patient was admitted into the hospital for right femoral neck fracture and had an ORIF by Dr. Parrish.    12:47 PM Patient seen and examined at bedside. The patient presents with an altered level of consciousness and the differential diagnosis includes but is not limited to infection, intoxication, worsening dementia. Ordered CT head, CT abdomen, chest xray, EKG, BNP, UA, UDS, PTT, APTT, ammonia, COD, CBC,  "CMP, lipase and troponin . Patient will be treated with Ativan 1 mg, IV fluids for his symptoms. Patient with a high possibility for DVT butThe patient is already on Lovenox and is not otherwise a candidate for anticoagulants.      3:04 PM ordered back and reviewed    4:39 PM Recheck: Patient re-evaluated at beside. The patient's son is at bedside and was informed of the patients work up. Patient has been in and out of the emergency department since being in the living facility. Son reports that patients dementia has been getting worse since being in the assisted living facility. Discussed patient's condition and treatment plan. Patient's lab and radiology results discussed. The son understood and is in agreement. Son reports that the patient was feeling fatigue today during a haircut. The son had to leave and found his father here. He informs me that this is the patient's baseline. Informed the son of pending UA. Discussed with the son that if the UA returns back normal, the patient will be safe to discharge. Son understands and agrees with the plan.    Discharge vitals below:  BP (!) 91/48   Pulse 78   Temp 35.9 °C (96.6 °F) (Temporal)   Resp 13   Ht 1.727 m (5' 8\")   Wt 82.1 kg (180 lb 14.2 oz)   SpO2 94%   BMI 27.50 kg/m²      HYDRATION: Based on the patient's presentation of Dehydration and Eldery ALOC the patient was given IV fluids. IV Hydration was used because oral hydration was not adequate alone. Upon recheck following hydration, the patient was unchanged.       The patient will return for new or worsening symptoms and is stable at the time of discharge.    The patient is referred to a primary physician for blood pressure management, diabetic screening, and for all other preventative health concerns.    DISPOSITION:  Patient will be discharged home in stable condition.    FOLLOW UP:  Sourav Stevenson D.O.  [address] [address] [phone]    Schedule an appointment " as soon as possible for a visit       [facility], Emergency Dept  [address] [facility] [address] [phone]    As needed, If symptoms worsen      FINAL IMPRESSION  1. Dementia with aggressive behavior          Luis Daniel DÍAZ (Scribe), am scribing for, and in the presence of, Silvana Beard M.D..    Electronically signed by: Luis Daniel Claire (Scribe), 5/29/2019    ISilvana M.D. personally performed the services described in this documentation, as scribed by Luis Daniel Claire in my presence, and it is both accurate and complete.    The note accurately reflects work and decisions made by me.  Silvana Beard  5/29/2019  7:26 PM

## 2019-05-29 NOTE — ED NOTES
Repeat green and purple top drawn and tubed to lab.   Pt continues to rest comfortably in bed with family bedside. Will continue to round.

## 2019-05-30 NOTE — ED NOTES
Pt cleaned up from incontinent episode. Straight cath preformed, urine collected and tubed to lab.

## 2019-05-30 NOTE — ED NOTES
D/C instructions provided to patient at bedside, verbalizes understanding and states plans for follow-up with PCP as recommended.   Pt medicated with Ativan (see MAR).   IV's removed.   All belongings accounted for, all questions answered at this time.

## 2019-06-06 ENCOUNTER — HOSPITAL ENCOUNTER (EMERGENCY)
Facility: MEDICAL CENTER | Age: 84
End: 2019-06-06
Attending: EMERGENCY MEDICINE
Payer: MEDICARE

## 2019-06-06 ENCOUNTER — APPOINTMENT (OUTPATIENT)
Dept: RADIOLOGY | Facility: MEDICAL CENTER | Age: 84
End: 2019-06-06
Attending: EMERGENCY MEDICINE
Payer: MEDICARE

## 2019-06-06 VITALS
OXYGEN SATURATION: 96 % | HEART RATE: 74 BPM | TEMPERATURE: 96.8 F | DIASTOLIC BLOOD PRESSURE: 60 MMHG | RESPIRATION RATE: 18 BRPM | SYSTOLIC BLOOD PRESSURE: 114 MMHG | HEIGHT: 68 IN | BODY MASS INDEX: 28.2 KG/M2 | WEIGHT: 186.07 LBS

## 2019-06-06 DIAGNOSIS — I82.4Z1 ACUTE DEEP VEIN THROMBOSIS (DVT) OF DISTAL VEIN OF RIGHT LOWER EXTREMITY (HCC): ICD-10-CM

## 2019-06-06 LAB
ALBUMIN SERPL BCP-MCNC: 3.6 G/DL (ref 3.2–4.9)
ALBUMIN/GLOB SERPL: 1.3 G/DL
ALP SERPL-CCNC: 79 U/L (ref 30–99)
ALT SERPL-CCNC: 7 U/L (ref 2–50)
ANION GAP SERPL CALC-SCNC: 5 MMOL/L (ref 0–11.9)
APTT PPP: 30.8 SEC (ref 24.7–36)
AST SERPL-CCNC: 14 U/L (ref 12–45)
BASOPHILS # BLD AUTO: 1 % (ref 0–1.8)
BASOPHILS # BLD: 0.06 K/UL (ref 0–0.12)
BILIRUB SERPL-MCNC: 0.3 MG/DL (ref 0.1–1.5)
BUN SERPL-MCNC: 16 MG/DL (ref 8–22)
CALCIUM SERPL-MCNC: 9.2 MG/DL (ref 8.5–10.5)
CHLORIDE SERPL-SCNC: 105 MMOL/L (ref 96–112)
CO2 SERPL-SCNC: 28 MMOL/L (ref 20–33)
CREAT SERPL-MCNC: 1.09 MG/DL (ref 0.5–1.4)
EOSINOPHIL # BLD AUTO: 0.26 K/UL (ref 0–0.51)
EOSINOPHIL NFR BLD: 4.4 % (ref 0–6.9)
ERYTHROCYTE [DISTWIDTH] IN BLOOD BY AUTOMATED COUNT: 56 FL (ref 35.9–50)
GLOBULIN SER CALC-MCNC: 2.7 G/DL (ref 1.9–3.5)
GLUCOSE SERPL-MCNC: 91 MG/DL (ref 65–99)
HCT VFR BLD AUTO: 38.4 % (ref 42–52)
HGB BLD-MCNC: 12.3 G/DL (ref 14–18)
IMM GRANULOCYTES # BLD AUTO: 0.02 K/UL (ref 0–0.11)
IMM GRANULOCYTES NFR BLD AUTO: 0.3 % (ref 0–0.9)
INR PPP: 1.08 (ref 0.87–1.13)
LYMPHOCYTES # BLD AUTO: 1.2 K/UL (ref 1–4.8)
LYMPHOCYTES NFR BLD: 20.2 % (ref 22–41)
MCH RBC QN AUTO: 32.7 PG (ref 27–33)
MCHC RBC AUTO-ENTMCNC: 32 G/DL (ref 33.7–35.3)
MCV RBC AUTO: 102.1 FL (ref 81.4–97.8)
MONOCYTES # BLD AUTO: 0.85 K/UL (ref 0–0.85)
MONOCYTES NFR BLD AUTO: 14.3 % (ref 0–13.4)
NEUTROPHILS # BLD AUTO: 3.54 K/UL (ref 1.82–7.42)
NEUTROPHILS NFR BLD: 59.8 % (ref 44–72)
NRBC # BLD AUTO: 0 K/UL
NRBC BLD-RTO: 0 /100 WBC
PLATELET # BLD AUTO: 259 K/UL (ref 164–446)
PMV BLD AUTO: 9.1 FL (ref 9–12.9)
POTASSIUM SERPL-SCNC: 4.7 MMOL/L (ref 3.6–5.5)
PROT SERPL-MCNC: 6.3 G/DL (ref 6–8.2)
PROTHROMBIN TIME: 14.2 SEC (ref 12–14.6)
RBC # BLD AUTO: 3.76 M/UL (ref 4.7–6.1)
SODIUM SERPL-SCNC: 138 MMOL/L (ref 135–145)
WBC # BLD AUTO: 5.9 K/UL (ref 4.8–10.8)

## 2019-06-06 PROCEDURE — 85610 PROTHROMBIN TIME: CPT

## 2019-06-06 PROCEDURE — 36415 COLL VENOUS BLD VENIPUNCTURE: CPT

## 2019-06-06 PROCEDURE — 99285 EMERGENCY DEPT VISIT HI MDM: CPT

## 2019-06-06 PROCEDURE — 700111 HCHG RX REV CODE 636 W/ 250 OVERRIDE (IP): Performed by: EMERGENCY MEDICINE

## 2019-06-06 PROCEDURE — 700102 HCHG RX REV CODE 250 W/ 637 OVERRIDE(OP): Performed by: EMERGENCY MEDICINE

## 2019-06-06 PROCEDURE — 85025 COMPLETE CBC W/AUTO DIFF WBC: CPT

## 2019-06-06 PROCEDURE — 93971 EXTREMITY STUDY: CPT | Mod: RT

## 2019-06-06 PROCEDURE — 80053 COMPREHEN METABOLIC PANEL: CPT

## 2019-06-06 PROCEDURE — 96374 THER/PROPH/DIAG INJ IV PUSH: CPT

## 2019-06-06 PROCEDURE — 85730 THROMBOPLASTIN TIME PARTIAL: CPT

## 2019-06-06 PROCEDURE — A9270 NON-COVERED ITEM OR SERVICE: HCPCS | Performed by: EMERGENCY MEDICINE

## 2019-06-06 RX ORDER — LORAZEPAM 2 MG/ML
0.5 INJECTION INTRAMUSCULAR ONCE
Status: COMPLETED | OUTPATIENT
Start: 2019-06-06 | End: 2019-06-06

## 2019-06-06 RX ADMIN — RIVAROXABAN 15 MG: 15 TABLET, FILM COATED ORAL at 17:19

## 2019-06-06 RX ADMIN — LORAZEPAM 0.5 MG: 2 INJECTION, SOLUTION INTRAMUSCULAR; INTRAVENOUS at 15:14

## 2019-06-06 ASSESSMENT — PAIN SCALES - WONG BAKER: WONGBAKER_NUMERICALRESPONSE: HURTS A WHOLE LOT

## 2019-06-06 ASSESSMENT — PAIN DESCRIPTION - DESCRIPTORS: DESCRIPTORS: ACHING

## 2019-06-06 NOTE — ED PROVIDER NOTES
ED Provider Note    CHIEF COMPLAINT  Chief Complaint   Patient presents with   • Leg Swelling   • Leg Pain       HPI  Joshua Nieves is a 86 y.o. male who presents for evaluation of leg pain and swelling.  Approximately 5 weeks ago the patient had hip surgery.  History is obtained from his adult son as the patient has significant dementia.  Physical therapy today at his long-term care facility noted that his right leg was swollen and appeared painful therefore he has been sent in for evaluation.  There is no history of recurrent trauma.  He is not on any blood thinners.  He has no history of DVT.  Son states that the swelling was noted today but he has no idea really when the swelling started.  No history is obtained from the patient.    REVIEW OF SYSTEMS  Unknown due to patient's condition.    PAST MEDICAL HISTORY  Past Medical History:   Diagnosis Date   • Abnormal CBC 8/4/2017   • HTN (hypertension) 04/09/2019   • Late onset Alzheimer's disease with behavioral disturbance 1/31/2018   • Vitamin D deficiency disease 8/4/2017       FAMILY HISTORY  History reviewed. No pertinent family history.    SOCIAL HISTORY  Social History     Social History   • Marital status:      Spouse name: N/A   • Number of children: N/A   • Years of education: N/A     Social History Main Topics   • Smoking status: Former Smoker     Quit date: 1/1/1987   • Smokeless tobacco: Never Used   • Alcohol use No   • Drug use: No   • Sexual activity: No     Other Topics Concern   • Not on file     Social History Narrative   • No narrative on file       SURGICAL HISTORY  Past Surgical History:   Procedure Laterality Date   • HIP CANNULATED SCREW Right 4/9/2019    Procedure: FIXATION, HIP, USING CANNULATED SCREW;  Surgeon: Rufus Parrish M.D.;  Location: SURGERY [facility];  Service: Orthopedics   • OTHER CARDIAC SURGERY      stent placement       CURRENT MEDICATIONS  Home Medications     Reviewed by Ade Meo R.N.  "(Registered Nurse) on 06/06/19 at 1421  Med List Status: Unable to Obtain   Medication Last Dose Status   acetaminophen (TYLENOL) 500 MG Tab  Active   Cholecalciferol (VITAMIN D) 2000 UNIT Tab  Active   DILTIAZem CD (DILTIAZEM CD) 120 MG CAPSULE SR 24 HR  Active   escitalopram (LEXAPRO) 20 MG tablet  Active   memantine (NAMENDA) 10 MG Tab  Active   QUEtiapine (SEROQUEL) 25 MG Tab  Active   RANEXA 500 MG TABLET SR 12 HR  Active   traZODone (DESYREL) 100 MG Tab  Active                ALLERGIES  Allergies   Allergen Reactions   • Spironolactone Diarrhea     Severe diarrhea       PHYSICAL EXAM  VITAL SIGNS: /74   Pulse 71   Temp 36 °C (96.8 °F)   Resp 14   Ht 1.727 m (5' 8\")   Wt 84.4 kg (186 lb 1.1 oz)   SpO2 95%   BMI 28.29 kg/m²   Constitutional: Elderly gentleman in no acute distress.  HENT: Normocephalic, Atraumatic.  Eyes:  EOMI, Conjunctiva normal, No discharge.   Cardiovascular: Normal heart rate.  Thorax & Lungs: No respiratory distress.  No chest tenderness.   Abdomen: Soft and nontender.  Skin: Warm, Dry.  Extremities: Right lower extremity shows edema up to the knee.  Calf is swollen and tender.  Left lower extremity shows no edema.  Musculoskeletal: Good range of motion in all major joints.   Neurologic: Awake and alert.    RADIOLOGY/PROCEDURES  Duplex ultrasound of the right lower extremity demonstrates DVT.    COURSE & MEDICAL DECISION MAKING  Pertinent Labs & Imaging studies reviewed. (See chart for details)  This is an 86-year-old here for evaluation of right leg pain and swelling.  He has had recent surgery and is had a long-term care facility.  Venous study demonstrates DVT to the right lower extremity.  Laboratories include chemistries which are normal.  INR is normal.  CBC shows normal white count and differential.  I discussed the results of the studies with the patient's son.  At this point he will be started on Xarelto.  He will be provided a prescription for Xarelto.  He is referred " to the anticoagulation clinic.  He will follow-up with Dr. Stevenson his primary care provider.  I discussed the risks and benefits with the son.  They are given a discharge instruction sheet on DVTs.  FINAL IMPRESSION  1.  Right lower extremity DVT  2.   3.         Electronically signed by: Boo Mary, 6/6/2019 3:04 PM

## 2019-06-06 NOTE — ED TRIAGE NOTES
Pt sent from [facility] for right leg swelling and pain. PT went to evaluate patient and noticed the swelling and insisted pt get checked out. Pt had right hip sx 4 weeks ago. Pt poor historian. Hx Dementia

## 2019-06-07 NOTE — ED NOTES
Pt family member given extensive education on DVT care, follow up and medication administration and risks and benefits. Pt instructed to follow up with anticoagulation clinic and PCP. Pt assisted to wheelchair w 2 person assist. Pt taken in wheelchair to sons vehicle to go back to facility. Pt family understands discharge instructions. Pt aox2 at baseline. Follow commands appropriately.

## 2019-06-10 ENCOUNTER — PATIENT OUTREACH (OUTPATIENT)
Dept: HEALTH INFORMATION MANAGEMENT | Facility: OTHER | Age: 84
End: 2019-06-10

## 2019-06-14 ENCOUNTER — TELEPHONE (OUTPATIENT)
Dept: MEDICAL GROUP | Facility: LAB | Age: 84
End: 2019-06-14

## 2019-06-14 DIAGNOSIS — I25.10 CORONARY ARTERY DISEASE INVOLVING NATIVE CORONARY ARTERY OF NATIVE HEART WITHOUT ANGINA PECTORIS: ICD-10-CM

## 2019-06-14 DIAGNOSIS — I10 ESSENTIAL HYPERTENSION: ICD-10-CM

## 2019-06-14 NOTE — PROGRESS NOTES
Patient Joshua Nieves was admitted to [facility] on 04/08/2019 for GLF and Femoral neck fracture and was discharged to [facility] on 04/16. Patient was discharge from Rehabilitation center on 05/10. Patient was  instructed to follow up with his Primary Care Physician, Dr. Sourav Stevenson and Home Health services upon discharge. Patient was scheduled to followed up with orthopedic surgeon, Dr. Rufus zelaya on 06/06, however appointment was not kept. Patient did not follow up with his Primary Care Physician upon discharge and patient's son declined IHD assistance on scheduling sooner appointment. IHD confirmed that patient started utilizing Home Health services on 05/17. Patient was seen at [facility] emergency room on two different occasions on 05/29, for Dementia with aggressive behavior and on 06/06  for leg pain. Patient has 3 future follow up appointment scheduled: 1- Cardiologist follow up on 06/18, 1- Primary Care Physician follow up on 07/08 and 1- Neurology appointment on 08/12. Patient lives at [facility], has great support. Patient was discharge with a high lace score, therefore, a PPS screening was conducted where the patient was scored at a 60%.

## 2019-06-14 NOTE — TELEPHONE ENCOUNTER
April from [facility] calling to inform you pt had elevated BP yesterday of 155/85 no other symptoms

## 2019-06-18 ENCOUNTER — TELEPHONE (OUTPATIENT)
Dept: MEDICAL GROUP | Facility: LAB | Age: 84
End: 2019-06-18

## 2019-06-18 NOTE — TELEPHONE ENCOUNTER
"· Home Health paperwork received from [facility] requiring provider signature.     · All appropriate fields completed by Medical Assistant: No    · Paperwork placed in \"MA to Provider\" folder/basket. Awaiting provider completion/signature.  "

## 2019-06-19 ENCOUNTER — TELEPHONE (OUTPATIENT)
Dept: MEDICAL GROUP | Facility: LAB | Age: 84
End: 2019-06-19

## 2019-06-19 NOTE — TELEPHONE ENCOUNTER
"· Home Health paperwork received from [facility] requiring provider signature.     · All appropriate fields completed by Medical Assistant: No    · Paperwork placed in \"MA to Provider\" folder/basket. Awaiting provider completion/signature.  "

## 2019-07-01 ENCOUNTER — ANTICOAGULATION VISIT (OUTPATIENT)
Dept: VASCULAR LAB | Facility: MEDICAL CENTER | Age: 84
End: 2019-07-01
Attending: INTERNAL MEDICINE
Payer: MEDICARE

## 2019-07-01 VITALS — DIASTOLIC BLOOD PRESSURE: 88 MMHG | HEART RATE: 63 BPM | SYSTOLIC BLOOD PRESSURE: 151 MMHG

## 2019-07-01 DIAGNOSIS — I82.90 DEEP VEIN THROMBOSIS (DVT) OF NON-EXTREMITY VEIN, UNSPECIFIED CHRONICITY: ICD-10-CM

## 2019-07-01 PROBLEM — I82.409 DEEP VEIN THROMBOSIS (HCC): Status: ACTIVE | Noted: 2019-07-01

## 2019-07-01 LAB
INR BLD: 1.1 (ref 0.9–1.2)
INR PPP: 1.1 (ref 2–3.5)

## 2019-07-01 PROCEDURE — 99203 OFFICE O/P NEW LOW 30 MIN: CPT | Performed by: NURSE PRACTITIONER

## 2019-07-01 PROCEDURE — 99213 OFFICE O/P EST LOW 20 MIN: CPT | Performed by: NURSE PRACTITIONER

## 2019-07-01 PROCEDURE — 85610 PROTHROMBIN TIME: CPT

## 2019-07-01 NOTE — PATIENT INSTRUCTIONS
Continue taking Xarelto 20 mg daily with food    Go to the ER for any signs/symptoms of prolonged bleeding lasting > 20 minutes without stop or for any shortness of breath or pain with breathing or pain/redness/swelling to any extremity  Let all your providers know you take this medication  Don't stop this medication without permission from your doctor or our clinic   refills before you run out  Avoid taking aspirin or NSAIDs (ibuprofen, Aleve)  Try not to miss doses  Elevate your leg as much as possible  Let us know of any medication changes

## 2019-07-01 NOTE — PROGRESS NOTES
Diagnosis: RLE DVT  Drug: Xarelto  LOT: 3-6 months then re-eval  CHADSVASC: n/a  HAS-BLED: 1 (age)    Health Status Since Last Assessment  Pt new to our clinic and new to Xarelto. Diagnosed with 1st time DVT to RLE on 6/6/19. Started on Xarelto 15 mg BID and now taking 20 mg daily. Had hip surgery about 5 weeks prior.    He resides at a living facility in [facility]. Presents today with his son. Pt has dementia. He is confused and somewhat aggressive during the exam today.    US 6/6/19 -   Acute occlusive DVT in the RIGHT common femoral, deep femoral and proximal    superficial femoral veins.    Pt sees Dr. Stevenson 7/9/19 and will discuss length of therapy.    Education given to patient regarding instructions for taking Xarelto, importance of strict compliance with this medication, drug/drug interactions (avoid NSAIDs and aspirin) and signs/symptoms of bleeding or thrombosis or stroke.     Instructed not to stop taking this medication without permission from your doctor or our clinic.      Adherence with DOAC Therapy  0 missed dose(s)  BLEEDING RISK ASSESSMENT NB:    Bleeding Risk Assessment  Severe epistaxis - no   Hemoptysis - no  Excessive or unusual bruising / hematomas - no  GIB/melena/BRBPR/hematemesis - no  Hematuria - no   Abnormal vaginal bleeding - n/a  Concerning daily headache or subdural hematoma symptoms - no  Decreasing hemoglobin or new anemia - no  Falls, presyncope, syncope, or seizures - no  Platelets: 259  Latest hemoglobin:     Lab Results   Component Value Date/Time    WBC 5.9 06/06/2019 03:10 PM    RBC 3.76 (L) 06/06/2019 03:10 PM    HEMOGLOBIN 12.3 (L) 06/06/2019 03:10 PM    HEMATOCRIT 38.4 (L) 06/06/2019 03:10 PM    .1 (H) 06/06/2019 03:10 PM    MCH 32.7 06/06/2019 03:10 PM    MCHC 32.0 (L) 06/06/2019 03:10 PM    MPV 9.1 06/06/2019 03:10 PM    NEUTSPOLYS 59.80 06/06/2019 03:10 PM    LYMPHOCYTES 20.20 (L) 06/06/2019 03:10 PM    MONOCYTES 14.30 (H) 06/06/2019 03:10 PM    EOSINOPHILS 4.40  06/06/2019 03:10 PM    BASOPHILS 1.00 06/06/2019 03:10 PM        HAS-BLED:  Hypertension (uncontrolled, >160 mmHg systolic) - no  Renal disease (dialysis, transplant, Cr >2.26 mg/dL or >200 µmol/L) - no  Liver disease (cirrhosis or bilirubin >2x normal with AST/ALT/AP >3x normal) - no  Stroke history no  Prior major bleeding or predisposition to bleeding - no  Labile INR Unstable/high INRs, time in therapeutic range <60% - no  Age >65 - no  Medication usage predisposing to bleeding (aspirin, clopidogrel, NSAIDs) - no  Alcohol use  ?8 drinks/week - no      Creatinine Clearance/Renal Function  Latest eGFR / creatinine:  Lab Results   Component Value Date/Time    SODIUM 138 06/06/2019 03:10 PM    POTASSIUM 4.7 06/06/2019 03:10 PM    CHLORIDE 105 06/06/2019 03:10 PM    CO2 28 06/06/2019 03:10 PM    GLUCOSE 91 06/06/2019 03:10 PM    BUN 16 06/06/2019 03:10 PM    CREATININE 1.09 06/06/2019 03:10 PM      • Is eGFR less than 50ml/min  - no  Cr cl 57.8 ml/min    If YES, calculate CrCl (see back)  Any recent dehydrating illness or medications added/changed? i.e. Diuretics      Drug Interactions  ASA/antiplatelets - no  NSAID - no  Other drug interactions -  (Review med list / OTCs;)  Current Outpatient Prescriptions on File Prior to Visit   Medication Sig Dispense Refill   • acetaminophen (TYLENOL) 500 MG Tab Take 500 mg by mouth every 6 hours as needed for Mild Pain.     • Cholecalciferol (VITAMIN D) 2000 UNIT Tab Take 2,000 Units by mouth every day.     • RANEXA 500 MG TABLET SR 12 HR TAKE 1 TABLET BY MOUTH TWICE DAILY 180 Tab 3   • traZODone (DESYREL) 100 MG Tab TAKE ONE TABLET BY MOUTH AT BEDTIME  30 Tab 10   • QUEtiapine (SEROQUEL) 25 MG Tab Take 3 Tabs by mouth 2 times a day. 180 Tab 6   • escitalopram (LEXAPRO) 20 MG tablet TAKE 1 TAB BY MOUTH EVERYDAY. 90 Tab 3   • memantine (NAMENDA) 10 MG Tab TAKE 1 TAB BY MOUTH 2 TIMES A DAY. 60 Tab 11   • DILTIAZem CD (DILTIAZEM CD) 120 MG CAPSULE SR 24 HR Take 1 Cap by mouth  every day. 90 Cap 2     No current facility-administered medications on file prior to visit.      Verified no anticonvulsant or azole therapy, education provided for future use.      Examination  Blood pressure:  151/88 (pt agitated, moving)  • Elevated BP - somewhat (sBP greater than 160 mmHg)  • Symptomatic hypotension - no  Significant gait impairment / imbalance / high risk for falls- he is at risk, uses W/C    Final Assessment and Recommendations:  Patient appears stable from the anticoagulation standpoint  Benefits of continued DOAC therapy outweigh risks for this patient  Recommend continue current DOAC at same dose    Offered compression stocking but family declined.    Continue taking Xarelto 20 mg daily with food  Go to the ER for any signs/symptoms of prolonged bleeding lasting > 20 minutes without stop or for any shortness of breath or pain with breathing or pain/redness/swelling to any extremity  Let all your providers know you take this medication  Don't stop this medication without permission from your doctor or our clinic   refills before you run out  Avoid taking aspirin or NSAIDs (ibuprofen, Aleve)  Try not to miss doses  Elevate your leg as much as possible  Let us know of any medication changes    Other Actions:   The rationale for continued DOAC therapy  The potential for minor, major or life-threatening bleeding  Dosing instructions, adherence, risks of non-adherence, handling missed doses  Avoiding OTC ASA & NSAIDs & minimizing EtOH to reduce bleeding risks  Dosing around upcoming procedure / surgery if applicable (see back)    Follow up:  Will follow up with patient in 3 months    Next appointment: Monday, September 9, 2019 at 11:00 am @ [facility]

## 2019-07-02 ENCOUNTER — TELEPHONE (OUTPATIENT)
Dept: MEDICAL GROUP | Facility: LAB | Age: 84
End: 2019-07-02

## 2019-07-02 NOTE — TELEPHONE ENCOUNTER
ESTABLISHED PATIENT PRE-VISIT PLANNING     Patient was NOT contacted to complete PVP.     Note: Patient will not be contacted if there is no indication to call.     1.  Reviewed notes from the last few office visits within the medical group: Yes    2.  If any orders were placed at last visit or intended to be done for this visit (i.e. 6 mos follow-up), do we have Results/Consult Notes?        •  Labs - Labs were not ordered at last office visit.   Note: If patient appointment is for lab review and patient did not complete labs, check with provider if OK to reschedule patient until labs completed.       •  Imaging - Imaging ordered, NOT completed. Patient advised to complete prior to next appointment.       •  Referrals - No referrals were ordered at last office visit.    3. Is this appointment scheduled as a Hospital Follow-Up? No    4.  Immunizations were updated in Epic using WebIZ?: Epic matches WebIZ       •  Web Iz Recommendations: TDAP and SHINGRIX (Shingles)    5.  Patient is due for the following Health Maintenance Topics:   Health Maintenance Due   Topic Date Due   • Annual Wellness Visit  04/30/1933   • IMM DTaP/Tdap/Td Vaccine (1 - Tdap) 04/30/1952   • IMM ZOSTER VACCINES (1 of 2) 04/30/1983       - Patient has completed FLU, PNEUMOVAX (PPSV23) and PREVNAR (PCV13)  Immunization(s) per WebIZ. Chart has been updated.    6. Orders for overdue Health Maintenance topics pended in Pre-Charting? N\A    7.  AHA (MDX) form printed for Provider? No, already completed    8.  Patient was NOT informed to arrive 15 min prior to their scheduled appointment and bring in their medication bottles.

## 2019-07-09 ENCOUNTER — OFFICE VISIT (OUTPATIENT)
Dept: MEDICAL GROUP | Facility: LAB | Age: 84
End: 2019-07-09
Payer: MEDICARE

## 2019-07-09 VITALS
SYSTOLIC BLOOD PRESSURE: 124 MMHG | RESPIRATION RATE: 14 BRPM | HEART RATE: 60 BPM | OXYGEN SATURATION: 97 % | DIASTOLIC BLOOD PRESSURE: 70 MMHG | TEMPERATURE: 97.9 F

## 2019-07-09 DIAGNOSIS — S72.001A CLOSED FRACTURE OF NECK OF RIGHT FEMUR, INITIAL ENCOUNTER (HCC): ICD-10-CM

## 2019-07-09 DIAGNOSIS — I82.491 ACUTE DEEP VEIN THROMBOSIS (DVT) OF OTHER SPECIFIED VEIN OF RIGHT LOWER EXTREMITY (HCC): ICD-10-CM

## 2019-07-09 DIAGNOSIS — R41.0 DELIRIUM WITH DEMENTIA: ICD-10-CM

## 2019-07-09 DIAGNOSIS — D50.9 IRON DEFICIENCY ANEMIA, UNSPECIFIED IRON DEFICIENCY ANEMIA TYPE: ICD-10-CM

## 2019-07-09 PROCEDURE — 99214 OFFICE O/P EST MOD 30 MIN: CPT | Performed by: INTERNAL MEDICINE

## 2019-07-09 NOTE — PROGRESS NOTES
CC: Joshua Nieves is a 86 y.o. male is suffering from   Chief Complaint   Patient presents with   • Hospital Follow-up     right hip fracture         SUBJECTIVE:  1. Closed fracture of neck of right femur, initial encounter ([facility])  Joshua is accompanied by Ced's son, history of fracture right femur    2. Delirium with dementia  Patient with a history of dementia, is to continue on Seroquel.  Discussed with the son that these medications will cause problems potentially with cognition    3. Iron deficiency anemia, unspecified iron deficiency anemia type  Recheck CBC    4. Acute deep vein thrombosis (DVT) of other specified vein of right lower extremity (HCC)  History of deep vein thrombosis diagnosed and treated        Past social, family, history:   Social History   Substance Use Topics   • Smoking status: Former Smoker     Quit date: 1/1/1987   • Smokeless tobacco: Never Used   • Alcohol use No         MEDICATIONS:    Current Outpatient Prescriptions:   •  rivaroxaban (XARELTO) 20 MG Tab tablet, Take 1 Tab by mouth with dinner., Disp: 30 Tab, Rfl: 3  •  acetaminophen (TYLENOL) 500 MG Tab, Take 500 mg by mouth every 6 hours as needed for Mild Pain., Disp: , Rfl:   •  Cholecalciferol (VITAMIN D) 2000 UNIT Tab, Take 2,000 Units by mouth every day., Disp: , Rfl:   •  RANEXA 500 MG TABLET SR 12 HR, TAKE 1 TABLET BY MOUTH TWICE DAILY, Disp: 180 Tab, Rfl: 3  •  traZODone (DESYREL) 100 MG Tab, TAKE ONE TABLET BY MOUTH AT BEDTIME , Disp: 30 Tab, Rfl: 10  •  QUEtiapine (SEROQUEL) 25 MG Tab, Take 3 Tabs by mouth 2 times a day., Disp: 180 Tab, Rfl: 6  •  escitalopram (LEXAPRO) 20 MG tablet, TAKE 1 TAB BY MOUTH EVERYDAY., Disp: 90 Tab, Rfl: 3  •  memantine (NAMENDA) 10 MG Tab, TAKE 1 TAB BY MOUTH 2 TIMES A DAY., Disp: 60 Tab, Rfl: 11  •  DILTIAZem CD (DILTIAZEM CD) 120 MG CAPSULE SR 24 HR, Take 1 Cap by mouth every day., Disp: 90 Cap, Rfl: 2    PROBLEMS:  Patient Active Problem List    Diagnosis Date Noted    • Closed fracture of neck of right femur ([facility]) 04/09/2019     Priority: High   • Delirium with dementia 04/11/2019     Priority: Medium   • Acute blood loss as cause of postoperative anemia 04/11/2019     Priority: Medium   • Acute cystitis without hematuria 04/09/2019     Priority: Medium   • Vitamin D deficiency disease 08/04/2017     Priority: Medium   • Late onset Alzheimer's disease with behavioral disturbance 01/31/2018     Priority: Low   • Coronary artery disease involving native coronary artery of native heart without angina pectoris 08/08/2017     Priority: Low   • Deep vein thrombosis ([facility]) [I82.409] 07/01/2019   • Angina at rest ([facility]) 08/08/2017   • Pure hypercholesterolemia 08/08/2017   • Abnormal CBC 08/04/2017       REVIEW OF SYSTEMS:  Gen.:  No Nausea, Vomiting, fever, Chills.  Heart: No chest pain.  Lungs:  No shortness of Breath.  Psychological: Oriented to self only     PHYSICAL EXAM   Constitutional: Alert, cooperative, not in acute distress.  Cardiovascular:  Rate Rhythm is regular without murmurs rubs clicks.     Thorax & Lungs: Clear to auscultation, no wheezing, rhonchi, or rales  HENT: Normocephalic, Atraumatic.  Eyes: PERRLA, EOMI, Conjunctiva normal.   Neck: Trachia is midline no swelling of the thyroid.   Lymphatic: No lymphadenopathy noted.   Musculoskeletal: Continued mild swelling right lower extremity  Neurologic: Alert oriented to self only, cranial nerves II through XII are intact, Normal motor function, Normal sensory function, No focal deficits noted.   Psychiatric: Affect normal, Judgment normal, Mood normal.     VITAL SIGNS:/70   Pulse 60   Temp 36.6 °C (97.9 °F) (Temporal)   Resp 14   SpO2 97%     Labs: Reviewed    Assessment:                                                     Plan:    1. Closed fracture of neck of right femur, initial encounter ([facility])  Stable, now wheelchair-bound    2. Delirium with dementia  Continue Seroquel patient is only oriented to self, not  place or time    3. Iron deficiency anemia, unspecified iron deficiency anemia type  Recheck CBC  - CBC WITH DIFFERENTIAL; Future    4. Acute deep vein thrombosis (DVT) of other specified vein of right lower extremity (HCC)  Continue anticoagulation.

## 2019-07-12 ENCOUNTER — TELEPHONE (OUTPATIENT)
Dept: VASCULAR LAB | Facility: MEDICAL CENTER | Age: 84
End: 2019-07-12

## 2019-07-12 ENCOUNTER — TELEPHONE (OUTPATIENT)
Dept: MEDICAL GROUP | Facility: LAB | Age: 84
End: 2019-07-12

## 2019-07-12 NOTE — TELEPHONE ENCOUNTER
"· Home Health paperwork received from MICHELLE requiring provider signature.     · All appropriate fields completed by Medical Assistant: No    · Paperwork placed in \"MA to Provider\" folder/basket. Awaiting provider completion/signature.  "

## 2019-07-13 NOTE — TELEPHONE ENCOUNTER
Patient started on anticoag for dvt after ortho issue.  Will tx for 3 mo then re-eval.     Will defer any indicated age appropriate screening for occult malignancy to pcp.    Michael Bloch, MD  Anticoagulation Clinic    Cc:

## 2019-07-22 RX ORDER — QUETIAPINE FUMARATE 25 MG/1
TABLET, FILM COATED ORAL
Qty: 180 TAB | Refills: 11 | Status: SHIPPED | OUTPATIENT
Start: 2019-07-22 | End: 2019-08-12

## 2019-08-12 ENCOUNTER — OFFICE VISIT (OUTPATIENT)
Dept: NEUROLOGY | Facility: MEDICAL CENTER | Age: 84
End: 2019-08-12
Payer: MEDICARE

## 2019-08-12 VITALS
DIASTOLIC BLOOD PRESSURE: 82 MMHG | HEART RATE: 75 BPM | SYSTOLIC BLOOD PRESSURE: 120 MMHG | OXYGEN SATURATION: 91 % | TEMPERATURE: 97.7 F | WEIGHT: 198 LBS | RESPIRATION RATE: 16 BRPM | BODY MASS INDEX: 30.11 KG/M2

## 2019-08-12 DIAGNOSIS — F02.818 LATE ONSET ALZHEIMER'S DISEASE WITH BEHAVIORAL DISTURBANCE (HCC): Primary | ICD-10-CM

## 2019-08-12 DIAGNOSIS — G30.1 LATE ONSET ALZHEIMER'S DISEASE WITH BEHAVIORAL DISTURBANCE (HCC): Primary | ICD-10-CM

## 2019-08-12 PROCEDURE — 99213 OFFICE O/P EST LOW 20 MIN: CPT | Performed by: PSYCHIATRY & NEUROLOGY

## 2019-08-12 RX ORDER — MEMANTINE HYDROCHLORIDE 10 MG/1
10 TABLET ORAL 2 TIMES DAILY
Qty: 60 TAB | Refills: 11 | Status: SHIPPED | OUTPATIENT
Start: 2019-08-12 | End: 2019-10-26

## 2019-08-12 NOTE — PROGRESS NOTES
Subjective:      Joshua Nieves is a 86 y.o. male who presents his son Gerardo, as usual, for follow-up with a history of moderate Alzheimer's disease but his clinical condition has taken sudden downturn following a recent fall with hip fracture and DVT development.     HPI    Since last seen, Joshua had been doing fairly well, on Namenda 10 mg, twice daily, we had increased Seroquel from 50 mill grams to 75 mg nightly to help with some of the behavioral outbursts that he was exhibiting.  The additional dose actually did do well.  Still living in an independent living circumstance, who is maintaining most of his affairs, he was socializing.  He had not been wandering.    Unfortunately, this last April 9, 2019, he had fallen and broken his right hip, underwent internal screw fixation for femoral neck fracture, postoperatively developed no complications and was discharged to rehabilitation.  Unfortunately, he was refusing to engage in physical therapy, was rehospitalized on June 9 for diagnosed DVT.  Since then he has been on Xarelto.    Since that time, he has been transferred to a memory care facility, has required essentially full cares.  He has become much less communicative, he does seem to acknowledge family when they arrive, but beyond that very little can be recounted about much of anything.  He does participate passively at times, still refusing to ambulate.    I reviewed the electronic health record, CT scan of the brain done on May 29, 2019 for another episode of collapse revealed extensive white matter changes bilaterally, no evidence of acute hemorrhage, ischemia, NPH, etc.    Medical, surgical and family histories are reviewed in the electronic health record, there are no new drug allergies.  He is on Namenda 10 mg, twice daily, Lexapro 20 mg daily, Seroquel 75 mg nightly, Xarelto 20 mg every evening, diltiazem CD and Ranexa.    Review of Systems   Unable to perform ROS: Dementia        Objective:      /82 (BP Location: Left arm, Patient Position: Sitting)   Pulse 75   Temp 36.5 °C (97.7 °F)   Resp 16   Wt 89.8 kg (198 lb)   SpO2 91%   BMI 30.11 kg/m²      Physical Exam    He appears in no acute distress.  Presenting in a wheelchair, pushed in by Gerardo, he is only minimally cooperative.  His vital signs are stable.  There is no malar rash.  The neck is supple.  Cardiac evaluation is unremarkable.    He acknowledges the examiner when he is question, can follow commands to a degree but is quite perseverative.  He is for the most part throughout the interview repeating himself with echolalia and other perseverative vocalizations.  He can name, following verbal commands more easily done.  He is field dependent.  There is hand-object substitution with motor skills assessment, but there is no apraxia.    PERRLA/EOMI, visual fields are full to movement detection, facial movements are symmetric, there is no hypophonia or dysarthria.  Sensory exam appears intact to temperature.    Musculoskeletal exam reveals no involuntary movements.  Strength assessment is grossly intact without obvious hemiparesis and asymmetry of spontaneous movements from right to left.  Reflexes are present throughout except at the ankles.    With the upper extremities there is no appendicular incoordination.  There is no obvious slowing of fine motor control with the hands.  Sensory exam appears intact to temperature.     Assessment/Plan:     1. Late onset Alzheimer's disease with behavioral disturbance  Namenda will be continued, I will discontinue the Seroquel since I suspect there is probably little need for juice and this may be part of the reason he has become less communicative and to a degree encephalopathic.  His condition has notably deteriorated since his fall, so far there is no clear pathology that could explain the sudden and sustained change structurally, there does not appear to be any new type of acute insult, but  unfortunately MRI imaging was not done.  I do not think an EEG study is indicated.  Metabolically things are stable.  The EHR did not indicate any episode of sustained hypoxia or global ischemia.    A letter is dictated indicating to the medical staff at his memory care facility my wish to discontinue Seroquel.  Hopefully this can be followed.  I also spent some time talking with the patient impressing upon him the need to walk.  Whether or not this truly registers remains to be seen.  We will follow-up in 6 months.    - memantine (NAMENDA) 10 MG Tab; Take 1 Tab by mouth 2 times a day.  Dispense: 60 Tab; Refill: 11    Time: 20-minute spent face-to-face for exam, review, discussion, and education, of this over 60% of the time spent face-to-face counseling and coordinating care.

## 2019-08-12 NOTE — LETTER
2019        Joshua Nieves  : 1933    To whom it may concern:    This letter is being written in regards to a medication, Seroquel, that Mr. Nieves has been prescribed through this office.  I am asking that the medication be discontinued.  My office can be contacted in this regard if there are any questions.    Sincerely:          Nima Hsieh M.D.

## 2019-08-26 ENCOUNTER — TELEPHONE (OUTPATIENT)
Dept: NEUROLOGY | Facility: MEDICAL CENTER | Age: 84
End: 2019-08-26

## 2019-08-26 NOTE — TELEPHONE ENCOUNTER
Unfortunately, as I do not have privileges directly in the facility where he is a resident, I can only make recommendations.  If their resident physician wishes to call me to talk about my concerns, and if he/she is then in agreement, they would discontinue the drug.

## 2019-08-26 NOTE — TELEPHONE ENCOUNTER
Spoke with Natalie ROMERO At patient's memory care facility, [facility], stating they have not discontinued the patient's seroquel even though Dr. Hsieh's note clearly stated to do so. They are afraid that if he does not have a back up medication his behavioral issues will become worse so they have not discontinued his seroquel even though you they have your office note and letter stating to do so. Please advise.     [phone].

## 2019-09-09 ENCOUNTER — ANTICOAGULATION VISIT (OUTPATIENT)
Dept: MEDICAL GROUP | Facility: PHYSICIAN GROUP | Age: 84
End: 2019-09-09
Payer: MEDICARE

## 2019-09-09 DIAGNOSIS — Z79.01 CHRONIC ANTICOAGULATION: Primary | ICD-10-CM

## 2019-09-09 LAB — INR PPP: 1.1 (ref 2–3.5)

## 2019-09-09 PROCEDURE — 99211 OFF/OP EST MAY X REQ PHY/QHP: CPT | Performed by: FAMILY MEDICINE

## 2019-09-09 PROCEDURE — 85610 PROTHROMBIN TIME: CPT | Performed by: FAMILY MEDICINE

## 2019-09-09 NOTE — PROGRESS NOTES
Anticoagulation Summary  As of 2019    INR goal:      TTR:   --   INR used for dosin.10 (2019)   Warfarin maintenance plan:   No maintenance plan   Plan last modified:   Jose Orantes, PharmD (2019)   Next INR check:   10/7/2019   Target end date:   10/7/2019    Indications    Deep vein thrombosis (HCC) [I82.409] [I82.409]             Anticoagulation Episode Summary     INR check location:       Preferred lab:       Send INR reminders to:       Comments:         Anticoagulation Care Providers     Provider Role Specialty Phone number    [facility] Responsible  [phone]        Anticoagulation Patient Findings       Anticoagulation Patient Findings  Health Status Since Last Assessment  Any new relevant medical problems, ED visits/hospitalizations -no   Any embolic events (stroke / TIA / systemic embolism)-  no     Adherence with DOAC Therapy  1 or more missed doses in an average week-  no  • If yes, number of missed doses n/a  BLEEDING RISK ASSESSMENT NB:     Bleeding Risk Assessment  Severe epistaxis  n Hemoptysis  n  Excessive or unusual bruising / hematomas n  GIB / melena / BRBPR / hematemesis  n  Hematuria? Abnormal vaginal bleeding  n  Concerning daily headache or subdural hematoma symptoms  n  Decreasing hemoglobin or new anemia  n  Latest hemoglobin:  Lab Results   Component Value Date/Time    WBC 5.9 2019 03:10 PM    RBC 3.76 (L) 2019 03:10 PM    HEMOGLOBIN 12.3 (L) 2019 03:10 PM    HEMATOCRIT 38.4 (L) 2019 03:10 PM    .1 (H) 2019 03:10 PM    MCH 32.7 2019 03:10 PM    MCHC 32.0 (L) 2019 03:10 PM    MPV 9.1 2019 03:10 PM    NEUTSPOLYS 59.80 2019 03:10 PM    LYMPHOCYTES 20.20 (L) 2019 03:10 PM    MONOCYTES 14.30 (H) 2019 03:10 PM    EOSINOPHILS 4.40 2019 03:10 PM    BASOPHILS 1.00 2019 03:10 PM        EtOH overuse  n  Falls, presyncope, syncope, or seizures  n  Uncontrolled  hypertension   CREATININE CLEARANCE /     Creatinine Clearance/Renal Function  Latest eGFR / creatinine:     Lab Results   Component Value Date/Time    SODIUM 138 06/06/2019 03:10 PM    POTASSIUM 4.7 06/06/2019 03:10 PM    CHLORIDE 105 06/06/2019 03:10 PM    CO2 28 06/06/2019 03:10 PM    GLUCOSE 91 06/06/2019 03:10 PM    BUN 16 06/06/2019 03:10 PM    CREATININE 1.09 06/06/2019 03:10 PM      • Is eGFR less than 50ml/min    If YES, calculate CrCl (see back)  Any recent dehydrating illness or medications added/changed? i.e. diuretics     Drug Interactions  ASA / other antiplatelets. no  NSAID no  Other drug interactions   diltiazem.  (Review med list / OTCs;)    Current Outpatient Medications:   •  memantine (NAMENDA) 10 MG Tab, Take 1 Tab by mouth 2 times a day., Disp: 60 Tab, Rfl: 11  •  rivaroxaban (XARELTO) 20 MG Tab tablet, Take 1 Tab by mouth with dinner., Disp: 30 Tab, Rfl: 3  •  acetaminophen (TYLENOL) 500 MG Tab, Take 500 mg by mouth every 6 hours as needed for Mild Pain., Disp: , Rfl:   •  Cholecalciferol (VITAMIN D) 2000 UNIT Tab, Take 2,000 Units by mouth every day., Disp: , Rfl:   •  RANEXA 500 MG TABLET SR 12 HR, TAKE 1 TABLET BY MOUTH TWICE DAILY, Disp: 180 Tab, Rfl: 3  •  traZODone (DESYREL) 100 MG Tab, TAKE ONE TABLET BY MOUTH AT BEDTIME , Disp: 30 Tab, Rfl: 10  •  escitalopram (LEXAPRO) 20 MG tablet, TAKE 1 TAB BY MOUTH EVERYDAY., Disp: 90 Tab, Rfl: 3  •  DILTIAZem CD (DILTIAZEM CD) 120 MG CAPSULE SR 24 HR, Take 1 Cap by mouth every day., Disp: 90 Cap, Rfl: 2          Final Assessment and Recommendations:  Patient appears stable from the anticoagulation standpoint  Benefits of continued DOAC therapy outweigh risks for this patient  Recommend continue current DOAC at same dose of 20 mg once daily.  I will make him a length of therapy appointment with 1 of our nurse practitioners.      Will follow up with patient in 1 months

## 2019-09-13 ENCOUNTER — TELEPHONE (OUTPATIENT)
Dept: MEDICAL GROUP | Facility: LAB | Age: 84
End: 2019-09-13

## 2019-09-13 DIAGNOSIS — R45.1 AGITATION: ICD-10-CM

## 2019-09-13 RX ORDER — DIVALPROEX SODIUM 125 MG/1
125 TABLET, DELAYED RELEASE ORAL 2 TIMES DAILY
Qty: 60 TAB | Refills: 5 | Status: SHIPPED | OUTPATIENT
Start: 2019-09-13 | End: 2019-09-30 | Stop reason: SDUPTHER

## 2019-09-13 NOTE — TELEPHONE ENCOUNTER
Ana:  Call [facility], I written out a prescription for Depakote which I would like him to start, 125 mg twice a day.  Regards, Sourav Stevenson, DO

## 2019-09-13 NOTE — TELEPHONE ENCOUNTER
1. Caller Name: Shik[facility] at [facility]                                         Call Back Number: [phone]      Patient approves a detailed voicemail message: no    Pt's living facility is requesting a med for Joshua to take as needed for agitation.  He gets extremely upset, verbally abusive towards staff, yelling.

## 2019-09-30 ENCOUNTER — OFFICE VISIT (OUTPATIENT)
Dept: MEDICAL GROUP | Facility: LAB | Age: 84
End: 2019-09-30
Payer: MEDICARE

## 2019-09-30 VITALS
OXYGEN SATURATION: 95 % | DIASTOLIC BLOOD PRESSURE: 70 MMHG | RESPIRATION RATE: 14 BRPM | HEART RATE: 80 BPM | SYSTOLIC BLOOD PRESSURE: 130 MMHG | TEMPERATURE: 98.1 F

## 2019-09-30 DIAGNOSIS — R45.1 AGITATION: ICD-10-CM

## 2019-09-30 DIAGNOSIS — R21 SKIN RASH: ICD-10-CM

## 2019-09-30 PROCEDURE — 99213 OFFICE O/P EST LOW 20 MIN: CPT | Performed by: INTERNAL MEDICINE

## 2019-09-30 RX ORDER — DIVALPROEX SODIUM 125 MG/1
125 TABLET, DELAYED RELEASE ORAL 2 TIMES DAILY
Qty: 60 TAB | Refills: 5 | Status: SHIPPED
Start: 2019-09-30 | End: 2019-10-26

## 2019-09-30 RX ORDER — TRIAMCINOLONE ACETONIDE 1 MG/G
0.25 OINTMENT TOPICAL DAILY
Qty: 1 TUBE | Refills: 1 | Status: SHIPPED | OUTPATIENT
Start: 2019-09-30 | End: 2019-10-26

## 2019-10-01 ENCOUNTER — TELEPHONE (OUTPATIENT)
Dept: MEDICAL GROUP | Facility: LAB | Age: 84
End: 2019-10-01

## 2019-10-01 NOTE — PROGRESS NOTES
CC: Joshua Nieves is a 86 y.o. male is suffering from   Chief Complaint   Patient presents with   • Rash     itchy rash on back and chest   • Agitation     discuss need for depakote, the care giver who requested this never returned my call         SUBJECTIVE:  1. Agitation  Joshua is here for follow-up, continues to have problems with agitation, discussed with Gerardo his son his need to be on Depakote prescriptions written    2. Skin rash  Patient was skin rash etiology uncertain we will try triamcinolone ointment        Past social, family, history:   Social History     Tobacco Use   • Smoking status: Former Smoker     Last attempt to quit: 1987     Years since quittin.7   • Smokeless tobacco: Never Used   Substance Use Topics   • Alcohol use: No   • Drug use: No         MEDICATIONS:    Current Outpatient Medications:   •  divalproex (DEPAKOTE) 125 MG EC tablet, Take 1 Tab by mouth 2 Times a Day., Disp: 60 Tab, Rfl: 5  •  triamcinolone acetonide (KENALOG) 0.1 % Ointment, Apply 0.25 g to affected area(s) every day., Disp: 1 Tube, Rfl: 1  •  memantine (NAMENDA) 10 MG Tab, Take 1 Tab by mouth 2 times a day., Disp: 60 Tab, Rfl: 11  •  rivaroxaban (XARELTO) 20 MG Tab tablet, Take 1 Tab by mouth with dinner., Disp: 30 Tab, Rfl: 3  •  acetaminophen (TYLENOL) 500 MG Tab, Take 500 mg by mouth every 6 hours as needed for Mild Pain., Disp: , Rfl:   •  Cholecalciferol (VITAMIN D) 2000 UNIT Tab, Take 2,000 Units by mouth every day., Disp: , Rfl:   •  RANEXA 500 MG TABLET SR 12 HR, TAKE 1 TABLET BY MOUTH TWICE DAILY, Disp: 180 Tab, Rfl: 3  •  traZODone (DESYREL) 100 MG Tab, TAKE ONE TABLET BY MOUTH AT BEDTIME , Disp: 30 Tab, Rfl: 10  •  escitalopram (LEXAPRO) 20 MG tablet, TAKE 1 TAB BY MOUTH EVERYDAY., Disp: 90 Tab, Rfl: 3  •  DILTIAZem CD (DILTIAZEM CD) 120 MG CAPSULE SR 24 HR, Take 1 Cap by mouth every day., Disp: 90 Cap, Rfl: 2    PROBLEMS:  Patient Active Problem List    Diagnosis Date Noted   •  Closed fracture of neck of right femur ([facility]) 04/09/2019     Priority: High   • Delirium with dementia 04/11/2019     Priority: Medium   • Acute blood loss as cause of postoperative anemia 04/11/2019     Priority: Medium   • Acute cystitis without hematuria 04/09/2019     Priority: Medium   • Vitamin D deficiency disease 08/04/2017     Priority: Medium   • Late onset Alzheimer's disease with behavioral disturbance 01/31/2018     Priority: Low   • Coronary artery disease involving native coronary artery of native heart without angina pectoris 08/08/2017     Priority: Low   • Deep vein thrombosis ([facility]) [I82.409] 07/01/2019   • Angina at rest ([facility]) 08/08/2017   • Pure hypercholesterolemia 08/08/2017   • Abnormal CBC 08/04/2017       REVIEW OF SYSTEMS:  Gen.:  No Nausea, Vomiting, fever, Chills.  Heart: No chest pain.  Lungs:  No shortness of Breath.  Psychological: Anjel unusual Anxiety depression     PHYSICAL EXAM   Constitutional: Alert, cooperative, not in acute distress.  Cardiovascular:  Rate Rhythm is regular without murmurs rubs clicks.     Thorax & Lungs: Clear to auscultation, no wheezing, rhonchi, or rales  HENT: Normocephalic, Atraumatic.  Eyes: PERRLA, EOMI, Conjunctiva normal.   Neck: Trachia is midline no swelling of the thyroid.   Lymphatic: No lymphadenopathy noted.   Musculoskeletal: Skin rash what appears to be diffuse vesicles  Neurologic: Alert & oriented x 3, cranial nerves II through XII are intact, Normal motor function, Normal sensory function, No focal deficits noted.   Psychiatric: Affect normal, Judgment normal, Mood normal.     VITAL SIGNS:/70   Pulse 80   Temp 36.7 °C (98.1 °F) (Temporal)   Resp 14   SpO2 95%     Labs: Reviewed    Assessment:                                                     Plan:    1. Agitation  Patient is to be started on Depakote  - divalproex (DEPAKOTE) 125 MG EC tablet; Take 1 Tab by mouth 2 Times a Day.  Dispense: 60 Tab; Refill: 5    2. Skin rash  Skin  rash start triamcinolone prescriptions written  - triamcinolone acetonide (KENALOG) 0.1 % Ointment; Apply 0.25 g to affected area(s) every day.  Dispense: 1 Tube; Refill: 1

## 2019-10-01 NOTE — TELEPHONE ENCOUNTER
Pt will start Depakote today.  Does he continue Seroquel?  Yes continue Seroquel per Dr Stevenson.  [facility] will need something stating that he is to be on both.  Fax:  [phone]

## 2019-10-07 ENCOUNTER — ANTICOAGULATION VISIT (OUTPATIENT)
Dept: VASCULAR LAB | Facility: MEDICAL CENTER | Age: 84
End: 2019-10-07
Attending: INTERNAL MEDICINE
Payer: MEDICARE

## 2019-10-07 VITALS — HEART RATE: 66 BPM | DIASTOLIC BLOOD PRESSURE: 82 MMHG | SYSTOLIC BLOOD PRESSURE: 142 MMHG

## 2019-10-07 DIAGNOSIS — I82.4Y9 DEEP VEIN THROMBOSIS (DVT) OF PROXIMAL LOWER EXTREMITY, UNSPECIFIED CHRONICITY, UNSPECIFIED LATERALITY (HCC): ICD-10-CM

## 2019-10-07 PROCEDURE — 99213 OFFICE O/P EST LOW 20 MIN: CPT | Performed by: NURSE PRACTITIONER

## 2019-10-07 PROCEDURE — 99212 OFFICE O/P EST SF 10 MIN: CPT | Performed by: NURSE PRACTITIONER

## 2019-10-07 ASSESSMENT — ENCOUNTER SYMPTOMS
LOSS OF CONSCIOUSNESS: 0
FALLS: 0
WEIGHT LOSS: 0
SHORTNESS OF BREATH: 0
BLOOD IN STOOL: 0
BRUISES/BLEEDS EASILY: 0
HEMOPTYSIS: 0
FEVER: 0
HEADACHES: 0
DIZZINESS: 0
CHILLS: 0

## 2019-10-07 NOTE — PROGRESS NOTES
"VASCULAR ANTI-COAGULATION VISIT  Subjective:   Joshua Nieves is a 86 y.o. male who presents today 10/7/2019 for   Chief Complaint   Patient presents with   • Deep Vein Thrombosis       HPI: Patient presents today for initial vascular evaluation of anticoagulation therapy.  Had 1st time VTE in 2019 - \" Acute occlusive DVT in the RIGHT common femoral, deep femoral and proximal superficial femoral veins.\"  Pt had a GLF in April which resulted in a rt hip fracture. He underwent percutaneous screw fixation and was discharged to a rehab facility.  About 2 weeks after transferring from rehab to his usual assisted living facility, his physical therapist noted swelling and redness to his RLE. US was done and he was started on Xarelto.  Pt's son reports that his dad often refused to get out of bed and participate in PT and did not ambulate on occasion after his surgery.  No other provoking factors such as extended travel.  He doesn't smoke.  Remote hx of possible DVT in his father for which the specifics are unclear but no other FH.  He is up to date on age-appropriate cancer screenings.  Pt has completed 3 months of Xarelto.  He is complaint with taking Xarel to as his medications are given by the medical staff at his living facility.  No problems with bleeding or excessive bruising.  No chest pain or SOB.  Still has some leg swelling.  Pt is a bit agitated during exam but his son is present and a good historian.      Social History     Tobacco Use   • Smoking status: Former Smoker     Last attempt to quit: 1987     Years since quittin.7   • Smokeless tobacco: Never Used   Substance Use Topics   • Alcohol use: No   • Drug use: No     DIET AND EXERCISE:  Weight Change: none  Diet: common adult  Exercise: no regular exercise program     Review of Systems   Constitutional: Negative for chills, fever and weight loss.   HENT: Negative for nosebleeds.    Respiratory: Negative for hemoptysis and shortness of " breath.    Cardiovascular: Positive for leg swelling. Negative for chest pain.   Gastrointestinal: Negative for blood in stool and melena.   Musculoskeletal: Negative for falls.   Neurological: Negative for dizziness, loss of consciousness and headaches.   Endo/Heme/Allergies: Does not bruise/bleed easily.      Objective:     Vitals:    10/07/19 1124   BP: 142/82   Pulse: 66     There is no height or weight on file to calculate BMI.  Physical Exam   Constitutional: He appears well-developed and well-nourished.   Cardiovascular: Normal rate, regular rhythm and normal heart sounds.   Pulmonary/Chest: Effort normal and breath sounds normal.   Abdominal: Soft. Bowel sounds are normal.   Musculoskeletal: He exhibits edema.   Mild RLE edema. Calf feels harley than left side.  Pt winces somewhat with palpation but verbalizes no pain.   Neurological: He is alert.   Skin: Skin is warm and dry.   Psychiatric:   Agitated when questioned         Lab Results   Component Value Date    PROTHROMBTM 14.2 06/06/2019    INR 1.10 09/09/2019         Lab Results   Component Value Date    SODIUM 138 06/06/2019    POTASSIUM 4.7 06/06/2019    CHLORIDE 105 06/06/2019    CO2 28 06/06/2019    GLUCOSE 91 06/06/2019    BUN 16 06/06/2019    CREATININE 1.09 06/06/2019        Lab Results   Component Value Date    WBC 5.9 06/06/2019    RBC 3.76 (L) 06/06/2019    HEMOGLOBIN 12.3 (L) 06/06/2019    HEMATOCRIT 38.4 (L) 06/06/2019    .1 (H) 06/06/2019    MCH 32.7 06/06/2019    MCHC 32.0 (L) 06/06/2019    MPV 9.1 06/06/2019 6/6/19 US RLE   Acute occlusive DVT in the RIGHT common femoral, deep femoral and proximal    superficial femoral veins.      Medical Decision Making:  Today's Assessment / Status / Plan:     1. Deep vein thrombosis (DVT) of proximal lower extremity, unspecified chronicity, unspecified laterality (HCC)  US-EXTREMITY VENOUS LOWER UNILAT RIGHT     Indication for anticoagulation: RLE DVT    Anti-Platelet/Anti-Coagulant Tx:  Xarelto 20 mg daily    Anti-Coagulation Plan: Discussed, at length, with pt's son the risks and benefits of stopping or continuing anticoagulation therapy. I let him know the ACCP guidelines suggest that anticoagulants stop after 3 months of therapy in patients with an acute DVT provoked by surgery rather than shorter or longer treatment courses (grade 1b). His risk of recurrence for a DVT following surgery is about 3% at 3 years. His risk of bleeding is about 3.4 % per year. I discussed the option of a repeat ultrasound as he still does have some swelling and possible pain to his RLE. His son prefers to have an US done. We will continue Xarelto for now. If his scan shows no acute clot, I will stop his Xarelto and switch him to ASA 81 mg. I stressed the importance of close surveillance of repeat clot and to seek emergent medical care for recurrent symptoms.    Smoking: continued complete abstinence    Physical Activity: frequency : goal is walking with assistance daily if possible    Weight Management and Nutrition:exercise counseling and nutrition counseling    Instructed to follow-up with PCP for remainder of adult medical needs: yes  We will partner with other provider in the management of established vascular disease and cardiometabolic risk factors    Studies to Be Obtained: RLE duplex  Labs to Be Obtained: none    Follow up by telephone in 2 weeks. Pt's son has a difficult getting his father into the car for doctor's visits. I will call the pt's son, Gerardo, once I receive the results of the US.    DOMINGUEZ Resendiz.    CC:   Sourav Stevenson D.O.  Dr. Bloch

## 2019-10-11 ENCOUNTER — TELEPHONE (OUTPATIENT)
Dept: MEDICAL GROUP | Facility: LAB | Age: 84
End: 2019-10-11

## 2019-10-11 DIAGNOSIS — R19.7 DIARRHEA, UNSPECIFIED TYPE: ICD-10-CM

## 2019-10-11 RX ORDER — LOPERAMIDE HYDROCHLORIDE 2 MG/1
2 CAPSULE ORAL 4 TIMES DAILY PRN
Qty: 30 CAP | Refills: 3 | Status: SHIPPED | OUTPATIENT
Start: 2019-10-11 | End: 2019-10-11 | Stop reason: SDUPTHER

## 2019-10-11 RX ORDER — LOPERAMIDE HYDROCHLORIDE 2 MG/1
2 CAPSULE ORAL 4 TIMES DAILY PRN
Qty: 30 CAP | Refills: 3 | Status: SHIPPED
Start: 2019-10-11 | End: 2019-10-26

## 2019-10-11 NOTE — TELEPHONE ENCOUNTER
1. Caller Name: [facility]                                         Call Back Number: [phone]      Patient approves a detailed voicemail message: N\A    Joshua has diarrhea.  The caretaker is asking for an Rx / order for immodium 2 mg to take as needed for diarrhea.  Please fax to [phone].

## 2019-10-12 NOTE — TELEPHONE ENCOUNTER
Ana:  Please call [facility], ask if possible to collect stool for C. difficle colitis test in the hospital system.   Regards, Sourav Stevenson, DO

## 2019-10-21 NOTE — PROGRESS NOTES
Discussed with son discontinuing depakote will need to taper if C diff is negative. 23% diarrhea.

## 2019-10-23 ENCOUNTER — HOSPITAL ENCOUNTER (OUTPATIENT)
Dept: RADIOLOGY | Facility: MEDICAL CENTER | Age: 84
End: 2019-10-23
Attending: NURSE PRACTITIONER
Payer: MEDICARE

## 2019-10-23 DIAGNOSIS — I82.4Y9 DEEP VEIN THROMBOSIS (DVT) OF PROXIMAL LOWER EXTREMITY, UNSPECIFIED CHRONICITY, UNSPECIFIED LATERALITY (HCC): ICD-10-CM

## 2019-10-23 PROCEDURE — 93971 EXTREMITY STUDY: CPT | Mod: RT

## 2019-10-26 ENCOUNTER — HOSPITAL ENCOUNTER (INPATIENT)
Facility: MEDICAL CENTER | Age: 84
LOS: 4 days | DRG: 372 | End: 2019-10-30
Attending: EMERGENCY MEDICINE | Admitting: HOSPITALIST
Payer: MEDICARE

## 2019-10-26 DIAGNOSIS — A04.72 C. DIFFICILE DIARRHEA: ICD-10-CM

## 2019-10-26 PROBLEM — A49.8 CLOSTRIDIUM DIFFICILE INFECTION: Status: ACTIVE | Noted: 2019-10-26

## 2019-10-26 LAB
ALBUMIN SERPL BCP-MCNC: 4 G/DL (ref 3.2–4.9)
ALBUMIN/GLOB SERPL: 1.2 G/DL
ALP SERPL-CCNC: 74 U/L (ref 30–99)
ALT SERPL-CCNC: 6 U/L (ref 2–50)
ANION GAP SERPL CALC-SCNC: 9 MMOL/L (ref 0–11.9)
AST SERPL-CCNC: 13 U/L (ref 12–45)
BASOPHILS # BLD AUTO: 1.2 % (ref 0–1.8)
BASOPHILS # BLD: 0.08 K/UL (ref 0–0.12)
BILIRUB SERPL-MCNC: 0.3 MG/DL (ref 0.1–1.5)
BUN SERPL-MCNC: 17 MG/DL (ref 8–22)
CALCIUM SERPL-MCNC: 9.1 MG/DL (ref 8.5–10.5)
CHLORIDE SERPL-SCNC: 103 MMOL/L (ref 96–112)
CO2 SERPL-SCNC: 26 MMOL/L (ref 20–33)
CREAT SERPL-MCNC: 1.36 MG/DL (ref 0.5–1.4)
EOSINOPHIL # BLD AUTO: 0.55 K/UL (ref 0–0.51)
EOSINOPHIL NFR BLD: 7.9 % (ref 0–6.9)
ERYTHROCYTE [DISTWIDTH] IN BLOOD BY AUTOMATED COUNT: 52.2 FL (ref 35.9–50)
GLOBULIN SER CALC-MCNC: 3.3 G/DL (ref 1.9–3.5)
GLUCOSE SERPL-MCNC: 109 MG/DL (ref 65–99)
HCT VFR BLD AUTO: 43.5 % (ref 42–52)
HGB BLD-MCNC: 13.9 G/DL (ref 14–18)
IMM GRANULOCYTES # BLD AUTO: 0.01 K/UL (ref 0–0.11)
IMM GRANULOCYTES NFR BLD AUTO: 0.1 % (ref 0–0.9)
LACTATE BLD-SCNC: 1.2 MMOL/L (ref 0.5–2)
LACTATE BLD-SCNC: 1.9 MMOL/L (ref 0.5–2)
LYMPHOCYTES # BLD AUTO: 1.47 K/UL (ref 1–4.8)
LYMPHOCYTES NFR BLD: 21.2 % (ref 22–41)
MCH RBC QN AUTO: 32.4 PG (ref 27–33)
MCHC RBC AUTO-ENTMCNC: 32 G/DL (ref 33.7–35.3)
MCV RBC AUTO: 101.4 FL (ref 81.4–97.8)
MONOCYTES # BLD AUTO: 0.91 K/UL (ref 0–0.85)
MONOCYTES NFR BLD AUTO: 13.1 % (ref 0–13.4)
NEUTROPHILS # BLD AUTO: 3.93 K/UL (ref 1.82–7.42)
NEUTROPHILS NFR BLD: 56.5 % (ref 44–72)
NRBC # BLD AUTO: 0 K/UL
NRBC BLD-RTO: 0 /100 WBC
PLATELET # BLD AUTO: 285 K/UL (ref 164–446)
PMV BLD AUTO: 9.1 FL (ref 9–12.9)
POTASSIUM SERPL-SCNC: 4.7 MMOL/L (ref 3.6–5.5)
PROT SERPL-MCNC: 7.3 G/DL (ref 6–8.2)
RBC # BLD AUTO: 4.29 M/UL (ref 4.7–6.1)
SODIUM SERPL-SCNC: 138 MMOL/L (ref 135–145)
WBC # BLD AUTO: 7 K/UL (ref 4.8–10.8)

## 2019-10-26 PROCEDURE — 700102 HCHG RX REV CODE 250 W/ 637 OVERRIDE(OP): Performed by: HOSPITALIST

## 2019-10-26 PROCEDURE — 85025 COMPLETE CBC W/AUTO DIFF WBC: CPT

## 2019-10-26 PROCEDURE — 700102 HCHG RX REV CODE 250 W/ 637 OVERRIDE(OP): Performed by: EMERGENCY MEDICINE

## 2019-10-26 PROCEDURE — 36415 COLL VENOUS BLD VENIPUNCTURE: CPT

## 2019-10-26 PROCEDURE — 99285 EMERGENCY DEPT VISIT HI MDM: CPT

## 2019-10-26 PROCEDURE — 99223 1ST HOSP IP/OBS HIGH 75: CPT | Mod: AI | Performed by: HOSPITALIST

## 2019-10-26 PROCEDURE — 700105 HCHG RX REV CODE 258: Performed by: HOSPITALIST

## 2019-10-26 PROCEDURE — 83605 ASSAY OF LACTIC ACID: CPT

## 2019-10-26 PROCEDURE — 80053 COMPREHEN METABOLIC PANEL: CPT

## 2019-10-26 PROCEDURE — A9270 NON-COVERED ITEM OR SERVICE: HCPCS | Performed by: HOSPITALIST

## 2019-10-26 PROCEDURE — A9270 NON-COVERED ITEM OR SERVICE: HCPCS | Performed by: EMERGENCY MEDICINE

## 2019-10-26 PROCEDURE — 770004 HCHG ROOM/CARE - ONCOLOGY PRIVATE *

## 2019-10-26 RX ORDER — SODIUM CHLORIDE, SODIUM LACTATE, POTASSIUM CHLORIDE, CALCIUM CHLORIDE 600; 310; 30; 20 MG/100ML; MG/100ML; MG/100ML; MG/100ML
1000 INJECTION, SOLUTION INTRAVENOUS ONCE
Status: COMPLETED | OUTPATIENT
Start: 2019-10-26 | End: 2019-10-26

## 2019-10-26 RX ORDER — MEMANTINE HYDROCHLORIDE 10 MG/1
10 TABLET ORAL 2 TIMES DAILY
COMMUNITY
End: 2020-08-31

## 2019-10-26 RX ORDER — MEMANTINE HYDROCHLORIDE 10 MG/1
10 TABLET ORAL 2 TIMES DAILY
Status: DISCONTINUED | OUTPATIENT
Start: 2019-10-26 | End: 2019-10-30 | Stop reason: HOSPADM

## 2019-10-26 RX ORDER — DILTIAZEM HYDROCHLORIDE 120 MG/1
120 CAPSULE, COATED, EXTENDED RELEASE ORAL DAILY
COMMUNITY

## 2019-10-26 RX ORDER — DIVALPROEX SODIUM 125 MG/1
125 CAPSULE, COATED PELLETS ORAL 2 TIMES DAILY
Status: DISCONTINUED | OUTPATIENT
Start: 2019-10-26 | End: 2019-10-30 | Stop reason: HOSPADM

## 2019-10-26 RX ORDER — FERROUS SULFATE 325(65) MG
325 TABLET ORAL DAILY
COMMUNITY

## 2019-10-26 RX ORDER — HEPARIN SODIUM 5000 [USP'U]/ML
5000 INJECTION, SOLUTION INTRAVENOUS; SUBCUTANEOUS EVERY 8 HOURS
Status: DISCONTINUED | OUTPATIENT
Start: 2019-10-26 | End: 2019-10-26

## 2019-10-26 RX ORDER — ACETAMINOPHEN 325 MG/1
650 TABLET ORAL EVERY 6 HOURS PRN
Status: DISCONTINUED | OUTPATIENT
Start: 2019-10-26 | End: 2019-10-30 | Stop reason: HOSPADM

## 2019-10-26 RX ORDER — ESCITALOPRAM OXALATE 20 MG/1
20 TABLET ORAL DAILY
COMMUNITY
End: 2020-01-06

## 2019-10-26 RX ORDER — LOPERAMIDE HYDROCHLORIDE 2 MG/1
2 CAPSULE ORAL 4 TIMES DAILY PRN
COMMUNITY
End: 2020-01-06

## 2019-10-26 RX ORDER — TRAMADOL HYDROCHLORIDE 50 MG/1
50 TABLET ORAL EVERY 8 HOURS PRN
COMMUNITY
End: 2020-01-06

## 2019-10-26 RX ORDER — DIVALPROEX SODIUM 125 MG/1
125 CAPSULE, COATED PELLETS ORAL DAILY
COMMUNITY
End: 2020-01-06

## 2019-10-26 RX ORDER — RANOLAZINE 500 MG/1
500 TABLET, EXTENDED RELEASE ORAL 2 TIMES DAILY
COMMUNITY
End: 2020-03-10

## 2019-10-26 RX ORDER — TRAZODONE HYDROCHLORIDE 100 MG/1
100 TABLET ORAL NIGHTLY
COMMUNITY
End: 2020-01-08

## 2019-10-26 RX ORDER — ESCITALOPRAM OXALATE 10 MG/1
20 TABLET ORAL
Status: DISCONTINUED | OUTPATIENT
Start: 2019-10-27 | End: 2019-10-30 | Stop reason: HOSPADM

## 2019-10-26 RX ORDER — TRAZODONE HYDROCHLORIDE 100 MG/1
100 TABLET ORAL
Status: DISCONTINUED | OUTPATIENT
Start: 2019-10-26 | End: 2019-10-30 | Stop reason: HOSPADM

## 2019-10-26 RX ORDER — RANOLAZINE 500 MG/1
500 TABLET, EXTENDED RELEASE ORAL 2 TIMES DAILY
Status: DISCONTINUED | OUTPATIENT
Start: 2019-10-26 | End: 2019-10-30 | Stop reason: HOSPADM

## 2019-10-26 RX ORDER — QUETIAPINE FUMARATE 25 MG/1
75 TABLET, FILM COATED ORAL 2 TIMES DAILY
COMMUNITY
End: 2020-07-27

## 2019-10-26 RX ORDER — DILTIAZEM HYDROCHLORIDE 120 MG/1
120 CAPSULE, COATED, EXTENDED RELEASE ORAL DAILY
Status: DISCONTINUED | OUTPATIENT
Start: 2019-10-27 | End: 2019-10-30 | Stop reason: HOSPADM

## 2019-10-26 RX ADMIN — TRAZODONE HYDROCHLORIDE 100 MG: 100 TABLET ORAL at 22:34

## 2019-10-26 RX ADMIN — SODIUM CHLORIDE, POTASSIUM CHLORIDE, SODIUM LACTATE AND CALCIUM CHLORIDE 1000 ML: 600; 310; 30; 20 INJECTION, SOLUTION INTRAVENOUS at 22:38

## 2019-10-26 RX ADMIN — RIVAROXABAN 20 MG: 20 TABLET, FILM COATED ORAL at 22:33

## 2019-10-26 RX ADMIN — RANOLAZINE 500 MG: 500 TABLET, EXTENDED RELEASE ORAL at 22:36

## 2019-10-26 RX ADMIN — VANCOMYCIN HYDROCHLORIDE 125 MG: 10 INJECTION, POWDER, LYOPHILIZED, FOR SOLUTION INTRAVENOUS at 19:15

## 2019-10-26 RX ADMIN — DIVALPROEX SODIUM 125 MG: 125 CAPSULE, COATED PELLETS ORAL at 22:34

## 2019-10-26 RX ADMIN — MEMANTINE HYDROCHLORIDE 10 MG: 10 TABLET ORAL at 22:35

## 2019-10-26 ASSESSMENT — LIFESTYLE VARIABLES: DO YOU DRINK ALCOHOL: NO

## 2019-10-27 LAB
ANION GAP SERPL CALC-SCNC: 9 MMOL/L (ref 0–11.9)
BUN SERPL-MCNC: 15 MG/DL (ref 8–22)
CALCIUM SERPL-MCNC: 9.1 MG/DL (ref 8.5–10.5)
CHLORIDE SERPL-SCNC: 105 MMOL/L (ref 96–112)
CO2 SERPL-SCNC: 26 MMOL/L (ref 20–33)
CREAT SERPL-MCNC: 1.28 MG/DL (ref 0.5–1.4)
ERYTHROCYTE [DISTWIDTH] IN BLOOD BY AUTOMATED COUNT: 50.8 FL (ref 35.9–50)
GLUCOSE SERPL-MCNC: 94 MG/DL (ref 65–99)
HCT VFR BLD AUTO: 42.6 % (ref 42–52)
HGB BLD-MCNC: 13.7 G/DL (ref 14–18)
MCH RBC QN AUTO: 32.2 PG (ref 27–33)
MCHC RBC AUTO-ENTMCNC: 32.2 G/DL (ref 33.7–35.3)
MCV RBC AUTO: 100.2 FL (ref 81.4–97.8)
PLATELET # BLD AUTO: 233 K/UL (ref 164–446)
PMV BLD AUTO: 8.8 FL (ref 9–12.9)
POTASSIUM SERPL-SCNC: 4.5 MMOL/L (ref 3.6–5.5)
RBC # BLD AUTO: 4.25 M/UL (ref 4.7–6.1)
SODIUM SERPL-SCNC: 140 MMOL/L (ref 135–145)
WBC # BLD AUTO: 6.9 K/UL (ref 4.8–10.8)

## 2019-10-27 PROCEDURE — 85027 COMPLETE CBC AUTOMATED: CPT

## 2019-10-27 PROCEDURE — A9270 NON-COVERED ITEM OR SERVICE: HCPCS | Performed by: HOSPITALIST

## 2019-10-27 PROCEDURE — 770021 HCHG ROOM/CARE - ISO PRIVATE

## 2019-10-27 PROCEDURE — 36415 COLL VENOUS BLD VENIPUNCTURE: CPT

## 2019-10-27 PROCEDURE — 80048 BASIC METABOLIC PNL TOTAL CA: CPT

## 2019-10-27 PROCEDURE — 700102 HCHG RX REV CODE 250 W/ 637 OVERRIDE(OP): Performed by: HOSPITALIST

## 2019-10-27 PROCEDURE — 99232 SBSQ HOSP IP/OBS MODERATE 35: CPT | Performed by: INTERNAL MEDICINE

## 2019-10-27 RX ADMIN — DILTIAZEM HYDROCHLORIDE 120 MG: 120 CAPSULE, COATED, EXTENDED RELEASE ORAL at 06:06

## 2019-10-27 RX ADMIN — RANOLAZINE 500 MG: 500 TABLET, EXTENDED RELEASE ORAL at 06:03

## 2019-10-27 RX ADMIN — RANOLAZINE 500 MG: 500 TABLET, EXTENDED RELEASE ORAL at 18:54

## 2019-10-27 RX ADMIN — DIVALPROEX SODIUM 125 MG: 125 CAPSULE, COATED PELLETS ORAL at 06:03

## 2019-10-27 RX ADMIN — VANCOMYCIN HYDROCHLORIDE 125 MG: 10 INJECTION, POWDER, LYOPHILIZED, FOR SOLUTION INTRAVENOUS at 18:54

## 2019-10-27 RX ADMIN — TRAZODONE HYDROCHLORIDE 100 MG: 100 TABLET ORAL at 20:33

## 2019-10-27 RX ADMIN — MEMANTINE HYDROCHLORIDE 10 MG: 10 TABLET ORAL at 18:54

## 2019-10-27 RX ADMIN — DIVALPROEX SODIUM 125 MG: 125 CAPSULE, COATED PELLETS ORAL at 18:54

## 2019-10-27 RX ADMIN — MEMANTINE HYDROCHLORIDE 10 MG: 10 TABLET ORAL at 06:03

## 2019-10-27 RX ADMIN — VANCOMYCIN HYDROCHLORIDE 125 MG: 10 INJECTION, POWDER, LYOPHILIZED, FOR SOLUTION INTRAVENOUS at 00:02

## 2019-10-27 RX ADMIN — RIVAROXABAN 20 MG: 20 TABLET, FILM COATED ORAL at 18:54

## 2019-10-27 RX ADMIN — ESCITALOPRAM OXALATE 20 MG: 10 TABLET ORAL at 06:03

## 2019-10-27 RX ADMIN — VANCOMYCIN HYDROCHLORIDE 125 MG: 10 INJECTION, POWDER, LYOPHILIZED, FOR SOLUTION INTRAVENOUS at 06:04

## 2019-10-27 ASSESSMENT — ENCOUNTER SYMPTOMS
NERVOUS/ANXIOUS: 0
DEPRESSION: 0
CHILLS: 0
BLURRED VISION: 0
HEADACHES: 0
FEVER: 0
VOMITING: 0
CONSTIPATION: 0
WEAKNESS: 0
CLAUDICATION: 0
SPEECH CHANGE: 0
HEARTBURN: 0
DIZZINESS: 0
DIARRHEA: 0
MYALGIAS: 0
ABDOMINAL PAIN: 0
PHOTOPHOBIA: 0
SENSORY CHANGE: 0
INSOMNIA: 0
COUGH: 0
SHORTNESS OF BREATH: 0

## 2019-10-27 NOTE — PROGRESS NOTES
Assumed care of patient at 2200. Pt is alert and oriented to self only. On room air. Denies pain or nausea. R PIV with positive blood return, infusing per MAR. Bed alarm on for safety, room close to nursing station. Special contact precautions in place for Cdiff. Call light within reach, will continue to round hourly.

## 2019-10-27 NOTE — ED PROVIDER NOTES
ED Provider Note    CHIEF COMPLAINT  Chief Complaint   Patient presents with   • Abnormal Labs       HPI  Joshua Nieves is a 86 y.o. male with a history of hypertension, Alzheimer's disease with behavioral difficulties, vitamin D deficiency who presents with his son by private car after testing positive for C. difficile toxin.  The patient was having some behavioral issues at [facility] and was started on Depakote approximately 3 weeks ago along with his Seroquel.  The patient developed fairly severe diarrhea after that which lasted for about 2 weeks.  His Depakote was then decreased from twice a day to once a day, and his diarrhea resolved according to his son.  The patient had a test for C. difficile performed on Wednesday though he did not appear to be having any further diarrhea.  The results were positive for C. difficile and the patient was sent to the ER for further evaluation and treatment.  The patient suffers from severe dementia and cannot really give me much information.  He denies having any abdominal pain at this time.  His son does not think he has had any diarrhea for the past several days.  The patient has had no obvious fever, cough, difficulty breathing, or any complaints of abdominal pain per his son.  Patient does have chronic abdominal pain from previous surgeries in the past.    REVIEW OF SYSTEMS  See HPI for further details. All other systems are negative.     PAST MEDICAL HISTORY  Past Medical History:   Diagnosis Date   • Abnormal CBC 8/4/2017   • HTN (hypertension) 04/09/2019   • Late onset Alzheimer's disease with behavioral disturbance (HCC) 1/31/2018   • Vitamin D deficiency disease 8/4/2017       FAMILY HISTORY  No family history on file.    SOCIAL HISTORY  Social History     Socioeconomic History   • Marital status:      Spouse name: Not on file   • Number of children: Not on file   • Years of education: Not on file   • Highest education level: Not on file    Occupational History   • Not on file   Social Needs   • Financial resource strain: Not on file   • Food insecurity:     Worry: Not on file     Inability: Not on file   • Transportation needs:     Medical: Not on file     Non-medical: Not on file   Tobacco Use   • Smoking status: Former Smoker     Last attempt to quit: 1987     Years since quittin.8   • Smokeless tobacco: Never Used   Substance and Sexual Activity   • Alcohol use: No   • Drug use: No   • Sexual activity: Never   Lifestyle   • Physical activity:     Days per week: Not on file     Minutes per session: Not on file   • Stress: Not on file   Relationships   • Social connections:     Talks on phone: Not on file     Gets together: Not on file     Attends [employer] service: Not on file     Active member of club or organization: Not on file     Attends meetings of clubs or organizations: Not on file     Relationship status: Not on file   • Intimate partner violence:     Fear of current or ex partner: Not on file     Emotionally abused: Not on file     Physically abused: Not on file     Forced sexual activity: Not on file   Other Topics Concern   • Not on file   Social History Narrative   • Not on file       SURGICAL HISTORY  Past Surgical History:   Procedure Laterality Date   • HIP CANNULATED SCREW Right 2019    Procedure: FIXATION, HIP, USING CANNULATED SCREW;  Surgeon: Rufus Parrish M.D.;  Location: SURGERY [facility];  Service: Orthopedics   • OTHER CARDIAC SURGERY      stent placement       CURRENT MEDICATIONS  Home Medications    **Home medications have not yet been reviewed for this encounter**         ALLERGIES  Allergies   Allergen Reactions   • Spironolactone Diarrhea     Severe diarrhea       PHYSICAL EXAM  VITAL SIGNS: Blood Pressure 125/76   Pulse 82   Temperature 37.6 °C (99.7 °F) (Temporal)   Respiration 16   Weight 89.8 kg (198 lb)   Oxygen Saturation 98%   Body Mass Index 30.11 kg/m²   Constitutional: Awake,  alert, in no acute distress, Non-toxic appearance.   HENT: Atraumatic. Bilateral external ears normal, mucous membranes moist, throat nonerythematous without exudates, nose is normal.  Eyes: PERRL, EOMI, conjunctiva moist, noninjected.  Neck: Nontender, Normal range of motion, No nuchal rigidity, No stridor.   Lymphatic: No lymphadenopathy noted.   Cardiovascular: Regular rate and rhythm, no murmurs, rubs, gallops.  Thorax & Lungs:  Good breath sounds bilaterally, no wheezes, rales, or retractions.  No chest tenderness.  Abdomen: Bowel sounds normal, Soft, nontender, nondistended, no rebound, guarding, masses.  Back: No CVA or spinal tenderness.  Extremities: Intact distal pulses, No edema, No tenderness.   Skin: Warm, Dry, No rashes.   Musculoskeletal: No joint swelling or tenderness.  Neurologic: Alert & oriented x 1, answers questions appropriately, cranial nerves II through XII appear intact, no facial asymmetry, speech appears fluent, sensory and motor function normal. No focal deficits.   Psychiatric: Patient is calm, cooperative, mood appears normal, judgment impaired secondary to his dementia.        COURSE & MEDICAL DECISION MAKING  Pertinent Labs & Imaging studies reviewed. (See chart for details)  The patient presents after having a positive test for C. Difficile toxin.    In addition to toxigenic C. difficile he is presently positive for a genetic marker of the hyper virulent 027/NAPI/BI strain.  The patient has been tolerating food and fluids, with no other known illness per his son.  Nursing spoke with the care facility and they have refused to take the patient back until he has been treated for the infection.    Based on laboratory work was obtained and shows a CBC white count 7000, hemoglobin 13.9, 57% polys, 21% lymphs, chemistry shows a glucose 109, otherwise unremarkable.  The patient received a first dose of oral vancomycin per pharmacy dosing.  Case was discussed with Nikolas yuan  admission.    FINAL IMPRESSION  1.  C. difficile diarrhea  2.   3.         Electronically signed by: Mika Beaver, 10/26/2019 7:06 PM

## 2019-10-27 NOTE — CARE PLAN
Problem: Safety  Goal: Will remain free from falls  Outcome: PROGRESSING AS EXPECTED  Intervention: Implement fall precautions  Note:   Patient is alert and oriented to self only, reoriented as needed. Bed alarm on for safety. Bed locked in lowest position. Three side rails up. Pt room close to nursing station. Call light within reach.      Problem: Knowledge Deficit  Goal: Knowledge of disease process/condition, treatment plan, diagnostic tests, and medications will improve  Outcome: PROGRESSING AS EXPECTED  Intervention: Explain information regarding disease process/condition, treatment plan, diagnostic tests, and medications and document in education  Note:   Oriented patient to hospital and explained why he is here. Pt was able to verbalize that he is her for antibiotics to treat Cdiff. POC disucssed with pt and his son.

## 2019-10-27 NOTE — PROGRESS NOTES
2 RN skin check done with Cipriano RN.   Devices in place: none  Pressure ulcers noted: n/a  Sacrum is pink and blanching, heels calloused and intact, bilateral outer feet red and blanching   Waffle mattress on bed, pillows in use for support and positioning, pt turns self from side to side

## 2019-10-27 NOTE — ASSESSMENT & PLAN NOTE
Patient sent from nursing home for a positive C. difficile test, he was started on oral vancomycin for treatment and ERP attempted to send him back to the nursing facility however they refused his return.    He will be admitted to the hospital and continued on oral vancomycin and placed on contact precautions.    Consult case management to discuss return disposition to [facility] care.  Total treatment of vanco would be 14 days from initial start date.  10/29: Patient has formed stools with no diarrhea.  Deemed noncontagious despite C. difficile antigen is positive (not sensitive test as its not toxin PCR) and will always reflect colonization rather than active infection.

## 2019-10-27 NOTE — CARE PLAN
Problem: Communication  Goal: The ability to communicate needs accurately and effectively will improve  Outcome: PROGRESSING AS EXPECTED     Problem: Safety  Goal: Will remain free from injury  Outcome: PROGRESSING AS EXPECTED  Goal: Will remain free from falls  Outcome: PROGRESSING AS EXPECTED     Problem: Infection  Goal: Will remain free from infection  Outcome: PROGRESSING AS EXPECTED     Problem: Venous Thromboembolism (VTW)/Deep Vein Thrombosis (DVT) Prevention:  Goal: Patient will participate in Venous Thrombosis (VTE)/Deep Vein Thrombosis (DVT)Prevention Measures  Outcome: PROGRESSING AS EXPECTED     Problem: Bowel/Gastric:  Goal: Normal bowel function is maintained or improved  Outcome: PROGRESSING AS EXPECTED  Goal: Will not experience complications related to bowel motility  Outcome: PROGRESSING AS EXPECTED     Problem: Mobility  Goal: Risk for activity intolerance will decrease  Outcome: PROGRESSING AS EXPECTED     Problem: Skin Integrity  Goal: Risk for impaired skin integrity will decrease  Outcome: PROGRESSING AS EXPECTED

## 2019-10-27 NOTE — ED NOTES
Patient presnts laying in bed with no obvious distress.  Alert, but confused.      Son present at bedside, JASON Booker answered his questions regarding plan of care.

## 2019-10-27 NOTE — ED NOTES
Medication reconciliation updated and complete per [facility]  Allergies have been verified and updated per MAR  No oral ABX within the last 14 days  Pt home [facility]

## 2019-10-27 NOTE — H&P
Hospital Medicine History & Physical Note    Date of Service  10/26/2019    Primary Care Physician  Sourav Stevenson D.O.    Consultants  None    Code Status  Full    Chief Complaint  Chief Complaint   Patient presents with   • Abnormal Labs       History of Presenting Illness  86 y.o. male who presented on 10/26/2019 with positive C. difficile test.  This is a pleasant elderly gentleman who carries a history of Alzheimer's dementia and resides at a nursing facility in their memory unit.  He is brought in by his son after having 2 weeks of diarrhea over a week ago with stool studies at the outside facility positive for C. difficile.  Patient son provides all history as patient is quite demented and unable to participate.  In short, he has not been on antibiotics recently but has been in and out of the hospital for hip surgery as well as several visits to the ER in addition to his residency at a nursing home.  Several weeks ago he was started on Depakote and Seroquel for worsening behavior at his memory care unit and shortly following this, he developed 2 weeks of diarrhea.  Diarrhea has since resolved however outpatient studies were positive for C. difficile and at the nursing home sent this patient to the hospital for further treatment.  Otherwise no history can be obtained from the patient.    Review of Systems  Review of Systems   Unable to perform ROS: Dementia       Past Medical History  Past Medical History:   Diagnosis Date   • HTN (hypertension) 04/09/2019   • Late onset Alzheimer's disease with behavioral disturbance (HCC) 1/31/2018   • Vitamin D deficiency disease 8/4/2017   • Abnormal CBC 8/4/2017       Surgical History  Past Surgical History:   Procedure Laterality Date   • HIP CANNULATED SCREW Right 4/9/2019    Procedure: FIXATION, HIP, USING CANNULATED SCREW;  Surgeon: Rufus Parrish M.D.;  Location: SURGERY [facility];  Service: Orthopedics   • OTHER CARDIAC SURGERY      stent placement        Family History  Unable to obtain from patient who cannot effectively communicate    Social History  Social History     Tobacco Use   • Smoking status: Former Smoker     Last attempt to quit: 1987     Years since quittin.8   • Smokeless tobacco: Never Used   Substance Use Topics   • Alcohol use: No   • Drug use: No       Allergies  Allergies   Allergen Reactions   • Spironolactone Diarrhea     Severe diarrhea       Medications  No current facility-administered medications on file prior to encounter.      Current Outpatient Medications on File Prior to Encounter   Medication Sig Dispense Refill   • loperamide (IMODIUM) 2 MG Cap Take 1 Cap by mouth 4 times a day as needed for Diarrhea. 30 Cap 3   • rivaroxaban (XARELTO) 20 MG Tab tablet Take 1 Tab by mouth with dinner. 30 Tab 3   • divalproex (DEPAKOTE) 125 MG EC tablet Take 1 Tab by mouth 2 Times a Day. 60 Tab 5   • triamcinolone acetonide (KENALOG) 0.1 % Ointment Apply 0.25 g to affected area(s) every day. 1 Tube 1   • memantine (NAMENDA) 10 MG Tab Take 1 Tab by mouth 2 times a day. 60 Tab 11   • acetaminophen (TYLENOL) 500 MG Tab Take 500 mg by mouth every 6 hours as needed for Mild Pain.     • Cholecalciferol (VITAMIN D) 2000 UNIT Tab Take 2,000 Units by mouth every day.     • RANEXA 500 MG TABLET SR 12 HR TAKE 1 TABLET BY MOUTH TWICE DAILY 180 Tab 3   • traZODone (DESYREL) 100 MG Tab TAKE ONE TABLET BY MOUTH AT BEDTIME  30 Tab 10   • escitalopram (LEXAPRO) 20 MG tablet TAKE 1 TAB BY MOUTH EVERYDAY. 90 Tab 3   • DILTIAZem CD (DILTIAZEM CD) 120 MG CAPSULE SR 24 HR Take 1 Cap by mouth every day. 90 Cap 2       Physical Exam  Hemodynamics  Temp (24hrs), Av.6 °C (99.7 °F), Min:37.6 °C (99.7 °F), Max:37.6 °C (99.7 °F)   Temperature: 37.6 °C (99.7 °F)  Pulse  Av  Min: 82  Max: 82    Blood Pressure : 125/76      Respiratory      Respiration: 16, Pulse Oximetry: 98 %             Physical Exam   Constitutional: No distress.   Elderly, frail   HENT:    Head: Normocephalic and atraumatic.   Right Ear: External ear normal.   Left Ear: External ear normal.   Eyes: EOM are normal. Right eye exhibits no discharge. Left eye exhibits no discharge.   Neck: Neck supple. No JVD present.   Cardiovascular: Normal rate, regular rhythm and normal heart sounds.   Pulmonary/Chest: Effort normal and breath sounds normal. No respiratory distress. He exhibits no tenderness.   Abdominal: Soft. Bowel sounds are normal. He exhibits no distension. There is no tenderness.   Musculoskeletal: He exhibits no edema.   Neurological: He is alert. No cranial nerve deficit.   Pleasantly demented, easily confused,   Skin: Skin is dry. He is not diaphoretic. No erythema.   Psychiatric: Cognition and memory are impaired. He exhibits abnormal recent memory and abnormal remote memory.   Nursing note and vitals reviewed.    Capillary refill less than 3 seconds, distal pulses intact    Laboratory:  Recent Labs     10/26/19  1830   WBC 7.0   RBC 4.29*   HEMOGLOBIN 13.9*   HEMATOCRIT 43.5   .4*   MCH 32.4   MCHC 32.0*   RDW 52.2*   PLATELETCT 285   MPV 9.1         No results for input(s): ALTSGPT, ASTSGOT, ALKPHOSPHAT, TBILIRUBIN, DBILIRUBIN, GAMMAGT, AMYLASE, LIPASE, ALB, PREALBUMIN, GLUCOSE in the last 72 hours.              Lab Results   Component Value Date    TROPONINI 0.04 05/29/2019       Imaging  Us-extremity Venous Lower Unilat Right    Result Date: 10/23/2019   Vascular Laboratory  CONCLUSIONS  No acute DVT detected.  Partially occlusive DVT again seen common femoral vein.   Prior study 06/06/19  Very limited study due to patient has dementia and could not postition legs  for exam. Patient also could not tolerate compressions.  JEN ECHOLS  Exam Date:     10/23/2019 10:08  Room #:     Out Patient  Priority:     Routine  Ht (in):             Wt (lb):  Ordering Physician:        KELLEY KOVACS  Referring Physician:       [id number] Shultz  Sonographer:               Amanda                              [facility]  Study Type:                Complete Unilateral  Technical Quality:         Poor  Age:    86    Gender:     M  MRN:    [id number]  :    1933      BSA:  Indications:     Swelling of limb  CPT Codes:       24220  ICD Codes:       729.81  History:         History of deep venous thrombosis. Patient is currently on                   blood thinners.  Limitations:  PROCEDURES:  Right lower extremity venous duplex imaging.  Serial compression, color and spectral Doppler flow evaluations were  performed.  The following venous structures were evaluated: common femoral, profunda  femoral, proximal portion of the greater saphenous, femoral, popliteal ,and  posterior tibial veins.  FINDINGS:  Right lower extremity -.  Small chronic thrombus seen in common femoral vein, partially occlusive.  Not fully seen due to patient position. Thrombus seen on prior exam.  Peroneal veins not well seen.  Everett Tyler  (Electronically Signed)  Final Date:      2019                   12:56        Assessment/Plan:  Anticipate that patient will need greater than 2 midnights for management of the discussed medical issues.    * Clostridium difficile infection  Assessment & Plan  Patient sent from nursing home for a positive C. difficile test, he was started on oral vancomycin for treatment and ERP attempted to send him back to the nursing facility however they refused his return.  Fortunately, the patient has no leukocytosis, he is afebrile with stable vital signs and is not ill-appearing.  He will be admitted to the hospital and continued on oral vancomycin and placed on contact precautions.  We will plan to consult case management in the morning to discuss when we can safely transition this patient back to his nursing home.    Deep vein thrombosis (HCC) [I82.409]- (present on admission)  Assessment & Plan  History of, status post recent hip surgery.  Continue home Xarelto.    Coronary artery  disease involving native coronary artery of native heart without angina pectoris- (present on admission)  Assessment & Plan  Patient has no complaints, denies any chest pain.  Continue home diltiazem and Ranexa.    Late onset Alzheimer's disease with behavioral disturbance (HCC)- (present on admission)  Assessment & Plan  Pleasantly demented, no evidence of behavioral disturbances during monitoring in the emergency room.  Continue home Depakote, Namenda, and Lexapro.  Frequent orientation and avoid sedating medications when possible.      Prophylaxis: Heparin for DVT prophylaxis, no PPI indicated, bowel protocol as needed

## 2019-10-27 NOTE — ASSESSMENT & PLAN NOTE
No evidence of behavioral disturbances during monitoring in the emergency room.  Continue home Depakote, Namenda, and Lexapro.  Frequent orientation and avoid sedating medications when possible.  High risk for hospital-acquired delirium.  Avoid benzodiazepines and/or anticholinergic medications.  Minimize lines  Avoid fully catheter  Daily orientation

## 2019-10-27 NOTE — PROGRESS NOTES
"[facility] Medicine Daily Progress Note    Date of Service  10/27/2019    Chief Complaint  86 y.o. male admitted 10/26/2019 with positive C diff test.    Hospital Course    Patient with 2 week history of diarrhea, was sent in from his care facility secondary to positive c diff.  He had been on vancomycin and diarrhea reportedly had improved prior to him presenting to the ED.  With further review of Dr Stevenson's and Dr Hsieh's notes, the patient has had some ongoing issues with behavioral disturbances.        Interval Problem Update  Patient has been pleasantly confused since admission, sleeping most of the time.  He was singing a tune to keep himself calm and with more than one person in the room seemed to be scared.  He told me he was in \"[facility]\" and he likes it there.      Consultants/Specialty  none    Code Status  Full - advanced directive confirms this    Disposition  Memory care facility.    Review of Systems  Review of Systems   Constitutional: Negative for chills and fever.   HENT: Negative for congestion.    Eyes: Negative for blurred vision and photophobia.   Respiratory: Negative for cough and shortness of breath.    Cardiovascular: Negative for chest pain, claudication and leg swelling.   Gastrointestinal: Negative for abdominal pain, constipation, diarrhea, heartburn and vomiting.   Genitourinary: Negative for dysuria and hematuria.   Musculoskeletal: Negative for joint pain and myalgias.   Skin: Negative for itching and rash.   Neurological: Negative for dizziness, sensory change, speech change, weakness and headaches.   Psychiatric/Behavioral: Negative for depression. The patient is not nervous/anxious and does not have insomnia.         Physical Exam  Temp:  [36.4 °C (97.5 °F)-37.6 °C (99.7 °F)] 36.4 °C (97.5 °F)  Pulse:  [61-93] 61  Resp:  [16-20] 18  BP: (120-140)/(68-86) 140/86  SpO2:  [90 %-98 %] 91 %    Physical Exam    Fluids    Intake/Output Summary (Last 24 hours) at 10/27/2019 " 1645  Last data filed at 10/27/2019 0600  Gross per 24 hour   Intake 60 ml   Output --   Net 60 ml       Laboratory  Recent Labs     10/26/19  1830 10/27/19  0553   WBC 7.0 6.9   RBC 4.29* 4.25*   HEMOGLOBIN 13.9* 13.7*   HEMATOCRIT 43.5 42.6   .4* 100.2*   MCH 32.4 32.2   MCHC 32.0* 32.2*   RDW 52.2* 50.8*   PLATELETCT 285 233   MPV 9.1 8.8*     Recent Labs     10/26/19  1830 10/27/19  0553   SODIUM 138 140   POTASSIUM 4.7 4.5   CHLORIDE 103 105   CO2 26 26   GLUCOSE 109* 94   BUN 17 15   CREATININE 1.36 1.28   CALCIUM 9.1 9.1                   Imaging  No orders to display        Assessment/Plan  * Clostridium difficile infection  Assessment & Plan  Patient sent from nursing home for a positive C. difficile test, he was started on oral vancomycin for treatment and ERP attempted to send him back to the nursing facility however they refused his return.    He will be admitted to the hospital and continued on oral vancomycin and placed on contact precautions.    Consult case management to discuss return disposition to memory care.      Deep vein thrombosis (HCC) [I82.409]- (present on admission)  Assessment & Plan  History of, status post recent hip surgery.  Continue home Xarelto.    Coronary artery disease involving native coronary artery of native heart without angina pectoris- (present on admission)  Assessment & Plan  Patient has no complaints, denies any chest pain.  Continue home diltiazem and Ranexa.    Late onset Alzheimer's disease with behavioral disturbance (HCC)- (present on admission)  Assessment & Plan  Pleasantly demented, no evidence of behavioral disturbances during monitoring in the emergency room.  Continue home Depakote, Namenda, and Lexapro.  Frequent orientation and avoid sedating medications when possible.         VTE prophylaxis: scds

## 2019-10-27 NOTE — ED TRIAGE NOTES
87 y/o male bib wheelchair with son, pt received positive c-diff results from [facility] earlier today, son was told by care home that he needed to come to the ED for treatment. Hx of severe dementia.

## 2019-10-28 ENCOUNTER — TELEPHONE (OUTPATIENT)
Dept: MEDICAL GROUP | Facility: LAB | Age: 84
End: 2019-10-28

## 2019-10-28 DIAGNOSIS — I82.4Y9 DEEP VEIN THROMBOSIS (DVT) OF PROXIMAL LOWER EXTREMITY, UNSPECIFIED CHRONICITY, UNSPECIFIED LATERALITY (HCC): ICD-10-CM

## 2019-10-28 LAB
ANION GAP SERPL CALC-SCNC: 6 MMOL/L (ref 0–11.9)
BASOPHILS # BLD AUTO: 0.8 % (ref 0–1.8)
BASOPHILS # BLD: 0.06 K/UL (ref 0–0.12)
BUN SERPL-MCNC: 16 MG/DL (ref 8–22)
CALCIUM SERPL-MCNC: 9.1 MG/DL (ref 8.5–10.5)
CHLORIDE SERPL-SCNC: 103 MMOL/L (ref 96–112)
CO2 SERPL-SCNC: 28 MMOL/L (ref 20–33)
CREAT SERPL-MCNC: 1.32 MG/DL (ref 0.5–1.4)
EOSINOPHIL # BLD AUTO: 0.5 K/UL (ref 0–0.51)
EOSINOPHIL NFR BLD: 6.3 % (ref 0–6.9)
ERYTHROCYTE [DISTWIDTH] IN BLOOD BY AUTOMATED COUNT: 50.7 FL (ref 35.9–50)
GLUCOSE SERPL-MCNC: 104 MG/DL (ref 65–99)
HCT VFR BLD AUTO: 43.3 % (ref 42–52)
HGB BLD-MCNC: 14.3 G/DL (ref 14–18)
IMM GRANULOCYTES # BLD AUTO: 0.02 K/UL (ref 0–0.11)
IMM GRANULOCYTES NFR BLD AUTO: 0.3 % (ref 0–0.9)
LYMPHOCYTES # BLD AUTO: 1.06 K/UL (ref 1–4.8)
LYMPHOCYTES NFR BLD: 13.3 % (ref 22–41)
MCH RBC QN AUTO: 33.1 PG (ref 27–33)
MCHC RBC AUTO-ENTMCNC: 33 G/DL (ref 33.7–35.3)
MCV RBC AUTO: 100.2 FL (ref 81.4–97.8)
MONOCYTES # BLD AUTO: 0.85 K/UL (ref 0–0.85)
MONOCYTES NFR BLD AUTO: 10.7 % (ref 0–13.4)
NEUTROPHILS # BLD AUTO: 5.47 K/UL (ref 1.82–7.42)
NEUTROPHILS NFR BLD: 68.6 % (ref 44–72)
NRBC # BLD AUTO: 0 K/UL
NRBC BLD-RTO: 0 /100 WBC
PLATELET # BLD AUTO: 266 K/UL (ref 164–446)
PMV BLD AUTO: 8.7 FL (ref 9–12.9)
POTASSIUM SERPL-SCNC: 4.4 MMOL/L (ref 3.6–5.5)
RBC # BLD AUTO: 4.32 M/UL (ref 4.7–6.1)
SODIUM SERPL-SCNC: 137 MMOL/L (ref 135–145)
WBC # BLD AUTO: 8 K/UL (ref 4.8–10.8)

## 2019-10-28 PROCEDURE — 99232 SBSQ HOSP IP/OBS MODERATE 35: CPT | Performed by: INTERNAL MEDICINE

## 2019-10-28 PROCEDURE — 700102 HCHG RX REV CODE 250 W/ 637 OVERRIDE(OP): Performed by: HOSPITALIST

## 2019-10-28 PROCEDURE — 770021 HCHG ROOM/CARE - ISO PRIVATE

## 2019-10-28 PROCEDURE — 85025 COMPLETE CBC W/AUTO DIFF WBC: CPT

## 2019-10-28 PROCEDURE — 36415 COLL VENOUS BLD VENIPUNCTURE: CPT

## 2019-10-28 PROCEDURE — A9270 NON-COVERED ITEM OR SERVICE: HCPCS | Performed by: HOSPITALIST

## 2019-10-28 PROCEDURE — 80048 BASIC METABOLIC PNL TOTAL CA: CPT

## 2019-10-28 RX ADMIN — RANOLAZINE 500 MG: 500 TABLET, EXTENDED RELEASE ORAL at 18:01

## 2019-10-28 RX ADMIN — VANCOMYCIN HYDROCHLORIDE 125 MG: 10 INJECTION, POWDER, LYOPHILIZED, FOR SOLUTION INTRAVENOUS at 18:01

## 2019-10-28 RX ADMIN — DIVALPROEX SODIUM 125 MG: 125 CAPSULE, COATED PELLETS ORAL at 18:01

## 2019-10-28 RX ADMIN — DIVALPROEX SODIUM 125 MG: 125 CAPSULE, COATED PELLETS ORAL at 06:00

## 2019-10-28 RX ADMIN — DILTIAZEM HYDROCHLORIDE 120 MG: 120 CAPSULE, COATED, EXTENDED RELEASE ORAL at 06:00

## 2019-10-28 RX ADMIN — VANCOMYCIN HYDROCHLORIDE 125 MG: 10 INJECTION, POWDER, LYOPHILIZED, FOR SOLUTION INTRAVENOUS at 11:13

## 2019-10-28 RX ADMIN — VANCOMYCIN HYDROCHLORIDE 125 MG: 10 INJECTION, POWDER, LYOPHILIZED, FOR SOLUTION INTRAVENOUS at 01:00

## 2019-10-28 RX ADMIN — ESCITALOPRAM OXALATE 20 MG: 10 TABLET ORAL at 06:00

## 2019-10-28 RX ADMIN — MEMANTINE HYDROCHLORIDE 10 MG: 10 TABLET ORAL at 18:01

## 2019-10-28 RX ADMIN — TRAZODONE HYDROCHLORIDE 100 MG: 100 TABLET ORAL at 20:56

## 2019-10-28 RX ADMIN — RANOLAZINE 500 MG: 500 TABLET, EXTENDED RELEASE ORAL at 06:00

## 2019-10-28 RX ADMIN — RIVAROXABAN 20 MG: 20 TABLET, FILM COATED ORAL at 18:01

## 2019-10-28 RX ADMIN — VANCOMYCIN HYDROCHLORIDE 125 MG: 10 INJECTION, POWDER, LYOPHILIZED, FOR SOLUTION INTRAVENOUS at 06:00

## 2019-10-28 RX ADMIN — MEMANTINE HYDROCHLORIDE 10 MG: 10 TABLET ORAL at 06:00

## 2019-10-28 ASSESSMENT — ENCOUNTER SYMPTOMS
ABDOMINAL PAIN: 0
CLAUDICATION: 0
CHILLS: 0
PHOTOPHOBIA: 0
SENSORY CHANGE: 0
DIARRHEA: 0
SPEECH CHANGE: 0
DEPRESSION: 0
MYALGIAS: 0
HEARTBURN: 0
WEAKNESS: 0
DIZZINESS: 0
VOMITING: 0
CONSTIPATION: 0
COUGH: 0
BLURRED VISION: 0
SHORTNESS OF BREATH: 0
INSOMNIA: 0
FEVER: 0
HEADACHES: 0
NERVOUS/ANXIOUS: 0

## 2019-10-28 NOTE — PROGRESS NOTES
Assumed care of patient at 1900. Pt is oriented to self only, baseline confusion. Bed alarm on for safety and room close to nursing station. Pt accidentally removed his PIV, no IV access at this time. Condom cath placed for urinary incontinence. Special contact precautions in place for Cdiff. Call light within reach, will continue to round hourly.

## 2019-10-28 NOTE — DISCHARGE PLANNING
Anticipated Discharge Disposition: Home to [facility] ([facility] )    Action: LSW contacted Pts son, Gerardo regarding Pts d/c.  Gerardo is concerned about Pt leaving too soon and reported he will be coming into [facility] tomorrow morning to visit Pt and talk with the bedside RN.  Gerardo reported he will discuss all concerns with the bedside RN.      Barriers to Discharge: Medical clearance    Plan: LSW will continue to follow and assist as needed.

## 2019-10-28 NOTE — PROGRESS NOTES
Patient has rested quietly this shift. Patient had several episodes of incontinence of urine this shift but none of stool. Safety measures in place. No other complaints or concerns at this time.

## 2019-10-28 NOTE — CARE PLAN
Problem: Communication  Goal: The ability to communicate needs accurately and effectively will improve  Outcome: PROGRESSING AS EXPECTED  Intervention: Reorient patient to environment as needed  Note:   Pt is oriented to self only, baseline confusion. Reoriented to situation and surroundings at needed. Pt is able to communicate needs effectively.      Problem: Skin Integrity  Goal: Risk for impaired skin integrity will decrease  Outcome: PROGRESSING AS EXPECTED  Intervention: Implement precautions to protect skin integrity in collaboration with the interdisciplinary team  Note:   Waffle mattress in place. Pillows in use to float heels. Condom cath placed to reduce moisture.

## 2019-10-28 NOTE — PROGRESS NOTES
"[facility] Medicine Daily Progress Note    Date of Service  10/28/2019    Chief Complaint  86 y.o. male admitted 10/26/2019 with positive C diff test.    Hospital Course    Patient with 2 week history of diarrhea, was sent in from his care facility secondary to positive c diff.  He had been on vancomycin and diarrhea reportedly had improved prior to him presenting to the ED.  With further review of Dr Stevenson's and Dr Hsieh's notes, the patient has had some ongoing issues with behavioral disturbances.        Interval Problem Update  10/27 Patient has been pleasantly confused since admission, sleeping most of the time.  He was singing a tune to keep himself calm and with more than one person in the room seemed to be scared.  He told me he was in \"[facility]\" and he likes it there.  10/28 Patient seen while being fed breakfast.  He has been cooperative with care and he denies having any pain and RN states his last bowel movement was on 10/26 and was formed.      Consultants/Specialty  none    Code Status  Full - advanced directive confirms this    Disposition  Memory care facility.    Review of Systems  Review of Systems   Constitutional: Negative for chills and fever.   HENT: Negative for congestion.    Eyes: Negative for blurred vision and photophobia.   Respiratory: Negative for cough and shortness of breath.    Cardiovascular: Negative for chest pain, claudication and leg swelling.   Gastrointestinal: Negative for abdominal pain, constipation, diarrhea, heartburn and vomiting.   Genitourinary: Negative for dysuria and hematuria.   Musculoskeletal: Negative for joint pain and myalgias.   Skin: Negative for itching and rash.   Neurological: Negative for dizziness, sensory change, speech change, weakness and headaches.   Psychiatric/Behavioral: Negative for depression. The patient is not nervous/anxious and does not have insomnia.         Physical Exam  Temp:  [36.6 °C (97.9 °F)-37 °C (98.6 °F)] 36.8 °C (98.2 " °F)  Pulse:  [59-67] 59  Resp:  [18-19] 19  BP: (126-150)/(59-86) 133/59  SpO2:  [92 %-95 %] 92 %    Physical Exam   Constitutional: He appears well-developed and well-nourished. No distress.   HENT:   Head: Normocephalic and atraumatic.   Eyes: Conjunctivae are normal. No scleral icterus.   Neck: Neck supple. No JVD present.   Cardiovascular: Normal rate, regular rhythm and normal heart sounds. Exam reveals no gallop and no friction rub.   No murmur heard.  Pulmonary/Chest: Effort normal and breath sounds normal. No respiratory distress. He has no wheezes. He exhibits no tenderness.   Abdominal: Soft. Bowel sounds are normal. He exhibits no distension and no mass. There is no tenderness.   Musculoskeletal: He exhibits no edema or tenderness.   Neurological: He is alert. No cranial nerve deficit.   Pleasantly confused.     Skin: Skin is warm and dry. He is not diaphoretic. No erythema. No pallor.   Psychiatric: He has a normal mood and affect. His behavior is normal.   Nursing note and vitals reviewed.      Fluids    Intake/Output Summary (Last 24 hours) at 10/28/2019 1518  Last data filed at 10/28/2019 0840  Gross per 24 hour   Intake 240 ml   Output --   Net 240 ml       Laboratory  Recent Labs     10/26/19  1830 10/27/19  0553 10/28/19  0613   WBC 7.0 6.9 8.0   RBC 4.29* 4.25* 4.32*   HEMOGLOBIN 13.9* 13.7* 14.3   HEMATOCRIT 43.5 42.6 43.3   .4* 100.2* 100.2*   MCH 32.4 32.2 33.1*   MCHC 32.0* 32.2* 33.0*   RDW 52.2* 50.8* 50.7*   PLATELETCT 285 233 266   MPV 9.1 8.8* 8.7*     Recent Labs     10/26/19  1830 10/27/19  0553 10/28/19  0613   SODIUM 138 140 137   POTASSIUM 4.7 4.5 4.4   CHLORIDE 103 105 103   CO2 26 26 28   GLUCOSE 109* 94 104*   BUN 17 15 16   CREATININE 1.36 1.28 1.32   CALCIUM 9.1 9.1 9.1                   Imaging  No orders to display        Assessment/Plan  * Clostridium difficile infection  Assessment & Plan  Patient sent from nursing home for a positive C. difficile test, he was started  on oral vancomycin for treatment and ERP attempted to send him back to the nursing facility however they refused his return.    He will be admitted to the hospital and continued on oral vancomycin and placed on contact precautions.    Consult case management to discuss return disposition to memory care.      Deep vein thrombosis (HCC) [I82.409]- (present on admission)  Assessment & Plan  History of, status post recent hip surgery.  Continue home Xarelto.    Coronary artery disease involving native coronary artery of native heart without angina pectoris- (present on admission)  Assessment & Plan  Patient has no complaints, denies any chest pain.  Continue home diltiazem and Ranexa.    Late onset Alzheimer's disease with behavioral disturbance (HCC)- (present on admission)  Assessment & Plan  Pleasantly demented, no evidence of behavioral disturbances during monitoring in the emergency room.  Continue home Depakote, Namenda, and Lexapro.  Frequent orientation and avoid sedating medications when possible.       VTE prophylaxis: scds

## 2019-10-28 NOTE — CARE PLAN
Problem: Safety  Goal: Will remain free from falls  Outcome: PROGRESSING AS EXPECTED  Intervention: Implement fall precautions  Flowsheets  Taken 10/28/2019 0840  Environmental Precautions: Treaded Slipper Socks on Patient;Personal Belongings, Wastebasket, Call Bell etc. in Easy Reach;Transferred to Stronger Side;Report Given to Other Health Care Providers Regarding Fall Risk;Bed in Low Position;Communication Sign for Patients & Families;Mobility Assessed & Appropriate Sign Placed  Taken 10/28/2019 1510  Chair/Bed Strip Alarm: Yes - Alarm On  Note:   Bed alarm refused, A&O x1, confused, room by nursing station, slipper socks, bed locked and in low position, call light and personal belongings with reach, report given to CNA, appropriate signs outside door, hourly rounding in place.       Problem: Skin Integrity  Goal: Risk for impaired skin integrity will decrease  Outcome: PROGRESSING AS EXPECTED  Intervention: Implement precautions to protect skin integrity in collaboration with the interdisciplinary team  Flowsheets  Taken 10/28/2019 0840  Skin Preventative Measures: Waffle Cushion;Pillows in Use for Support / Positioning;Pillows in Use to Float Heels  Bed Types: Pressure Redistribution Mattress (Atmosair)  Friction Interventions: Draw Sheet / Pad Used for Repositioning  Moisturizers: Barrier Cream  Patient is Receiving Nutrition: Oral Intake Adequate  Activity : Bed  Taken 10/28/2019 1500  Patient Turns / Repositioning: Right Side  Assistance / Tolerance for Turning/Repositioning: Assistance of Two or More  Note:   Q 2 h turns, waffle overlay, feet elevated. Pt incontinent- frequent linens check.

## 2019-10-28 NOTE — PROGRESS NOTES
Assumed care of pt @0700. Bedside report received. Pt AOX 1. Pt confused. No PIV- MD aware. Last Bm 10/26. Q 2 h turns, waffle overlay, feet elevated. Pt incontinent- frequent linens check. 2 p assist. Fall precaution in place. POC discussed with pt, all questions answered at this time. Pt makes needs known, call light within reach, hourly rounding in place.

## 2019-10-29 PROCEDURE — A9270 NON-COVERED ITEM OR SERVICE: HCPCS | Performed by: HOSPITALIST

## 2019-10-29 PROCEDURE — 99232 SBSQ HOSP IP/OBS MODERATE 35: CPT | Performed by: FAMILY MEDICINE

## 2019-10-29 PROCEDURE — 770021 HCHG ROOM/CARE - ISO PRIVATE

## 2019-10-29 PROCEDURE — 700102 HCHG RX REV CODE 250 W/ 637 OVERRIDE(OP): Performed by: HOSPITALIST

## 2019-10-29 RX ADMIN — MEMANTINE HYDROCHLORIDE 10 MG: 10 TABLET ORAL at 06:16

## 2019-10-29 RX ADMIN — VANCOMYCIN HYDROCHLORIDE 125 MG: 10 INJECTION, POWDER, LYOPHILIZED, FOR SOLUTION INTRAVENOUS at 17:07

## 2019-10-29 RX ADMIN — DILTIAZEM HYDROCHLORIDE 120 MG: 120 CAPSULE, COATED, EXTENDED RELEASE ORAL at 06:16

## 2019-10-29 RX ADMIN — VANCOMYCIN HYDROCHLORIDE 125 MG: 10 INJECTION, POWDER, LYOPHILIZED, FOR SOLUTION INTRAVENOUS at 11:31

## 2019-10-29 RX ADMIN — DIVALPROEX SODIUM 125 MG: 125 CAPSULE, COATED PELLETS ORAL at 06:16

## 2019-10-29 RX ADMIN — VANCOMYCIN HYDROCHLORIDE 125 MG: 10 INJECTION, POWDER, LYOPHILIZED, FOR SOLUTION INTRAVENOUS at 23:33

## 2019-10-29 RX ADMIN — MEMANTINE HYDROCHLORIDE 10 MG: 10 TABLET ORAL at 17:07

## 2019-10-29 RX ADMIN — VANCOMYCIN HYDROCHLORIDE 125 MG: 10 INJECTION, POWDER, LYOPHILIZED, FOR SOLUTION INTRAVENOUS at 06:16

## 2019-10-29 RX ADMIN — TRAZODONE HYDROCHLORIDE 100 MG: 100 TABLET ORAL at 20:22

## 2019-10-29 RX ADMIN — DIVALPROEX SODIUM 125 MG: 125 CAPSULE, COATED PELLETS ORAL at 17:07

## 2019-10-29 RX ADMIN — RANOLAZINE 500 MG: 500 TABLET, EXTENDED RELEASE ORAL at 17:07

## 2019-10-29 RX ADMIN — VANCOMYCIN HYDROCHLORIDE 125 MG: 10 INJECTION, POWDER, LYOPHILIZED, FOR SOLUTION INTRAVENOUS at 00:12

## 2019-10-29 RX ADMIN — RIVAROXABAN 20 MG: 20 TABLET, FILM COATED ORAL at 17:07

## 2019-10-29 RX ADMIN — ESCITALOPRAM OXALATE 20 MG: 10 TABLET ORAL at 06:16

## 2019-10-29 RX ADMIN — RANOLAZINE 500 MG: 500 TABLET, EXTENDED RELEASE ORAL at 06:16

## 2019-10-29 ASSESSMENT — ENCOUNTER SYMPTOMS
NAUSEA: 0
SHORTNESS OF BREATH: 0
CLAUDICATION: 0
INSOMNIA: 0
COUGH: 0
DIZZINESS: 0
DOUBLE VISION: 0
TINGLING: 0
FEVER: 0
ABDOMINAL PAIN: 0
WEAKNESS: 0
SENSORY CHANGE: 0
DIARRHEA: 0
MYALGIAS: 0
BLURRED VISION: 0
NERVOUS/ANXIOUS: 0
HEADACHES: 0
SPEECH CHANGE: 0
CONSTIPATION: 0
DEPRESSION: 0
VOMITING: 0
PHOTOPHOBIA: 0
CHILLS: 0
WEIGHT LOSS: 0
HEARTBURN: 0

## 2019-10-29 NOTE — DISCHARGE PLANNING
Care Transition Team Assessment      The patient resides at [facility] assisted living facility. Facility is willing to take the patient back as long as he is medically cleared and not contagious. Son Gerardo will be able to assist with transportation. Facility is able to assist with transport as well.    Information Source  Orientation : Disoriented to Event, Disoriented to Place, Disoriented to Time  Information Given By: Relative, Other (Comments)(Toni Carrillo)         Elopement Risk  Legal Hold: No  Ambulatory or Self Mobile in Wheelchair: No-Not an Elopement Risk  Elopement Risk: Not at Risk for Elopement    Interdisciplinary Discharge Planning  Lives with - Patient's Self Care Capacity: Other (Comments)(MARGO)  Support Systems: Children  Name of Care Facility: [facility]              Finances  Financial Barriers to Discharge: No  Prescription Coverage: Yes    Vision / Hearing Impairment  Right Eye Vision: Wears Glasses  Left Eye Vision: Wears Glasses              Domestic Abuse  Have you ever been the victim of abuse or violence?: No              Anticipated Discharge Information  Anticipated discharge disposition: Assisted living

## 2019-10-29 NOTE — DISCHARGE PLANNING
Discharge disposition: the patient will return to [facility] once medically cleared.  Discussed in rounds with MD that the patient is no longer contagious.  Spoke with patient's son Gerardo at the bedside. Gerardo stated that he can take his dad back to [facility] when he is ready for discharge.  Outreach call to [facility] to see when they are able to accept the patient back. Spoke with Kade who stated that all the leaders are in the meeting and will get back to this RN CM when available.  Plan: will follow up.

## 2019-10-29 NOTE — PROGRESS NOTES
[id number]    Pt is alert and oriented to self, pleasant w/ staff. Able to answer simple questions but not demonstrating ability to communicate needs independently. Call bell provided and use reinforced but pt not utilizing. Room door open, hourly rounding, bed alarm on.   Pt medically ready for d/c per MD. Pts son very concerned about memory care facility returning Joshua to ED if return not approved by facility supervisors prior hospital discharge and arrival back to [facility]. This was discussed w/ care manager and Dr. Juarez -will hold on d/c until able to communicate w/ facility. *Update: Facility managers at [facility] are in meeting all day. Dr. Juarez notified, will hold on d/c until tomorrow when able to discuss prior to discharge and transfer.     Merry Prather

## 2019-10-29 NOTE — PROGRESS NOTES
"[facility] Medicine Daily Progress Note    Date of Service  10/29/2019    Chief Complaint  86 y.o. male admitted 10/26/2019 with positive C diff test.    Hospital Course    Patient with 2 week history of diarrhea, was sent in from his care facility secondary to positive c diff.  He had been on vancomycin and diarrhea reportedly had improved prior to him presenting to the ED.  With further review of Dr Stevenson's and Dr Hsieh's notes, the patient has had some ongoing issues with behavioral disturbances.        Interval Problem Update  10/27 Patient has been pleasantly confused since admission, sleeping most of the time.  He was singing a tune to keep himself calm and with more than one person in the room seemed to be scared.  He told me he was in [facility] and he likes it there.  10/28 Patient seen while being fed breakfast.  He has been cooperative with care and he denies having any pain and RN states his last bowel movement was on 10/26 and was formed.  10/29: Sleeping comfortably.  Awakens to verbal stimulus.  Mental status at baseline.  No diarrhea reported.  No acute distress noted.    Consultants/Specialty  none    Code Status  Full - advanced directive confirms this    Disposition  [facility].    Review of Systems  Review of Systems   Constitutional: Negative for chills, fever and weight loss.   HENT: Negative for congestion, hearing loss and tinnitus.    Eyes: Negative for blurred vision, double vision and photophobia.   Respiratory: Negative for cough and shortness of breath.    Cardiovascular: Negative for chest pain, claudication and leg swelling.   Gastrointestinal: Negative for abdominal pain, constipation, diarrhea, heartburn, nausea and vomiting.   Genitourinary: Negative for dysuria, hematuria and urgency.   Musculoskeletal: Negative for joint pain and myalgias.   Skin: Negative for itching and rash.   Neurological: Negative for dizziness, tingling, sensory change, speech change, " weakness and headaches.   Psychiatric/Behavioral: Negative for depression and suicidal ideas. The patient is not nervous/anxious and does not have insomnia.         Physical Exam  Temp:  [36.3 °C (97.4 °F)-36.8 °C (98.3 °F)] 36.8 °C (98.3 °F)  Pulse:  [62-68] 65  Resp:  [18] 18  BP: (125-157)/(66-88) 157/88  SpO2:  [90 %-97 %] 97 %    Physical Exam   Constitutional: No distress.   HENT:   Head: Normocephalic and atraumatic.   Eyes: Conjunctivae are normal. Right eye exhibits no discharge. Left eye exhibits no discharge. No scleral icterus.   Neck: Neck supple. No JVD present.   Cardiovascular: Normal rate, regular rhythm and normal heart sounds. Exam reveals no gallop and no friction rub.   Pulmonary/Chest: Effort normal and breath sounds normal. No respiratory distress. He has no wheezes.   Abdominal: Soft. Bowel sounds are normal. He exhibits no distension and no mass. There is no tenderness.   Musculoskeletal: He exhibits no tenderness or deformity.   Neurological: He is alert. No cranial nerve deficit.   Pleasantly confused.     Skin: Skin is warm and dry. He is not diaphoretic. No erythema. No pallor.   Psychiatric: He has a normal mood and affect. He is slowed. Cognition and memory are impaired. He expresses impulsivity. He exhibits abnormal recent memory and abnormal remote memory.   Nursing note and vitals reviewed.      Fluids    Intake/Output Summary (Last 24 hours) at 10/29/2019 1608  Last data filed at 10/29/2019 1400  Gross per 24 hour   Intake 120 ml   Output --   Net 120 ml       Laboratory  Recent Labs     10/26/19  1830 10/27/19  0553 10/28/19  0613   WBC 7.0 6.9 8.0   RBC 4.29* 4.25* 4.32*   HEMOGLOBIN 13.9* 13.7* 14.3   HEMATOCRIT 43.5 42.6 43.3   .4* 100.2* 100.2*   MCH 32.4 32.2 33.1*   MCHC 32.0* 32.2* 33.0*   RDW 52.2* 50.8* 50.7*   PLATELETCT 285 233 266   MPV 9.1 8.8* 8.7*     Recent Labs     10/26/19  1830 10/27/19  0553 10/28/19  0613   SODIUM 138 140 137   POTASSIUM 4.7 4.5 4.4    CHLORIDE 103 105 103   CO2 26 26 28   GLUCOSE 109* 94 104*   BUN 17 15 16   CREATININE 1.36 1.28 1.32   CALCIUM 9.1 9.1 9.1                   Imaging  No orders to display        Assessment/Plan  * Clostridium difficile infection  Assessment & Plan  Patient sent from nursing home for a positive C. difficile test, he was started on oral vancomycin for treatment and ERP attempted to send him back to the nursing facility however they refused his return.    He will be admitted to the hospital and continued on oral vancomycin and placed on contact precautions.    Consult case management to discuss return disposition to memory care.  Total treatment of vanco would be 14 days from initial start date.  10/29: Patient has formed stools with no diarrhea.  Deemed noncontagious despite C. difficile antigen is positive (not sensitive test as its not toxin PCR) and will always reflect colonization rather than active infection.        Deep vein thrombosis (HCC) [I82.409]- (present on admission)  Assessment & Plan  History of, status post recent hip surgery.  Continue home Xarelto.    Coronary artery disease involving native coronary artery of native heart without angina pectoris- (present on admission)  Assessment & Plan  Patient has no complaints, denies any chest pain.  Continue home diltiazem and Ranexa.    Late onset Alzheimer's disease with behavioral disturbance (HCC)- (present on admission)  Assessment & Plan  No evidence of behavioral disturbances during monitoring in the emergency room.  Continue home Depakote, Namenda, and Lexapro.  Frequent orientation and avoid sedating medications when possible.  High risk for hospital-acquired delirium.  Avoid benzodiazepines and/or anticholinergic medications.  Minimize lines  Avoid fully catheter  Daily orientation      Plan of care discussed with multidisciplinary team    VTE prophylaxis: scds

## 2019-10-30 ENCOUNTER — ANTICOAGULATION MONITORING (OUTPATIENT)
Dept: VASCULAR LAB | Facility: MEDICAL CENTER | Age: 84
End: 2019-10-30

## 2019-10-30 VITALS
OXYGEN SATURATION: 95 % | SYSTOLIC BLOOD PRESSURE: 138 MMHG | TEMPERATURE: 97.8 F | WEIGHT: 186.73 LBS | HEART RATE: 71 BPM | BODY MASS INDEX: 28.39 KG/M2 | DIASTOLIC BLOOD PRESSURE: 83 MMHG | RESPIRATION RATE: 18 BRPM

## 2019-10-30 DIAGNOSIS — I82.4Y9 DEEP VEIN THROMBOSIS (DVT) OF PROXIMAL LOWER EXTREMITY, UNSPECIFIED CHRONICITY, UNSPECIFIED LATERALITY (HCC): ICD-10-CM

## 2019-10-30 PROBLEM — I82.409 DEEP VEIN THROMBOSIS (HCC): Status: RESOLVED | Noted: 2019-07-01 | Resolved: 2019-10-30

## 2019-10-30 LAB
ALBUMIN SERPL BCP-MCNC: 4 G/DL (ref 3.2–4.9)
ALBUMIN/GLOB SERPL: 1.4 G/DL
ALP SERPL-CCNC: 71 U/L (ref 30–99)
ALT SERPL-CCNC: <5 U/L (ref 2–50)
ANION GAP SERPL CALC-SCNC: 9 MMOL/L (ref 0–11.9)
AST SERPL-CCNC: 10 U/L (ref 12–45)
BASOPHILS # BLD AUTO: 1.1 % (ref 0–1.8)
BASOPHILS # BLD: 0.08 K/UL (ref 0–0.12)
BILIRUB SERPL-MCNC: 0.4 MG/DL (ref 0.1–1.5)
BUN SERPL-MCNC: 19 MG/DL (ref 8–22)
CALCIUM SERPL-MCNC: 8.9 MG/DL (ref 8.5–10.5)
CHLORIDE SERPL-SCNC: 102 MMOL/L (ref 96–112)
CO2 SERPL-SCNC: 26 MMOL/L (ref 20–33)
CREAT SERPL-MCNC: 1.36 MG/DL (ref 0.5–1.4)
EOSINOPHIL # BLD AUTO: 0.43 K/UL (ref 0–0.51)
EOSINOPHIL NFR BLD: 5.8 % (ref 0–6.9)
ERYTHROCYTE [DISTWIDTH] IN BLOOD BY AUTOMATED COUNT: 51.7 FL (ref 35.9–50)
GLOBULIN SER CALC-MCNC: 2.8 G/DL (ref 1.9–3.5)
GLUCOSE SERPL-MCNC: 111 MG/DL (ref 65–99)
HCT VFR BLD AUTO: 46.2 % (ref 42–52)
HGB BLD-MCNC: 15.1 G/DL (ref 14–18)
IMM GRANULOCYTES # BLD AUTO: 0.03 K/UL (ref 0–0.11)
IMM GRANULOCYTES NFR BLD AUTO: 0.4 % (ref 0–0.9)
LYMPHOCYTES # BLD AUTO: 1.19 K/UL (ref 1–4.8)
LYMPHOCYTES NFR BLD: 15.9 % (ref 22–41)
MCH RBC QN AUTO: 32.7 PG (ref 27–33)
MCHC RBC AUTO-ENTMCNC: 32.7 G/DL (ref 33.7–35.3)
MCV RBC AUTO: 100 FL (ref 81.4–97.8)
MONOCYTES # BLD AUTO: 0.79 K/UL (ref 0–0.85)
MONOCYTES NFR BLD AUTO: 10.6 % (ref 0–13.4)
NEUTROPHILS # BLD AUTO: 4.95 K/UL (ref 1.82–7.42)
NEUTROPHILS NFR BLD: 66.2 % (ref 44–72)
NRBC # BLD AUTO: 0 K/UL
NRBC BLD-RTO: 0 /100 WBC
PLATELET # BLD AUTO: 310 K/UL (ref 164–446)
PMV BLD AUTO: 9 FL (ref 9–12.9)
POTASSIUM SERPL-SCNC: 4.1 MMOL/L (ref 3.6–5.5)
PROT SERPL-MCNC: 6.8 G/DL (ref 6–8.2)
RBC # BLD AUTO: 4.62 M/UL (ref 4.7–6.1)
SODIUM SERPL-SCNC: 137 MMOL/L (ref 135–145)
WBC # BLD AUTO: 7.5 K/UL (ref 4.8–10.8)

## 2019-10-30 PROCEDURE — A9270 NON-COVERED ITEM OR SERVICE: HCPCS | Performed by: HOSPITALIST

## 2019-10-30 PROCEDURE — 99239 HOSP IP/OBS DSCHRG MGMT >30: CPT | Performed by: FAMILY MEDICINE

## 2019-10-30 PROCEDURE — 85025 COMPLETE CBC W/AUTO DIFF WBC: CPT

## 2019-10-30 PROCEDURE — 80053 COMPREHEN METABOLIC PANEL: CPT

## 2019-10-30 PROCEDURE — 36415 COLL VENOUS BLD VENIPUNCTURE: CPT

## 2019-10-30 PROCEDURE — 700102 HCHG RX REV CODE 250 W/ 637 OVERRIDE(OP): Performed by: HOSPITALIST

## 2019-10-30 RX ORDER — VANCOMYCIN HYDROCHLORIDE 125 MG/1
125 CAPSULE ORAL 4 TIMES DAILY
Qty: 36 CAP | Refills: 0 | Status: SHIPPED | OUTPATIENT
Start: 2019-10-30 | End: 2019-11-08

## 2019-10-30 RX ORDER — ASPIRIN 81 MG/1
81 TABLET, CHEWABLE ORAL DAILY
COMMUNITY

## 2019-10-30 RX ADMIN — MEMANTINE HYDROCHLORIDE 10 MG: 10 TABLET ORAL at 05:37

## 2019-10-30 RX ADMIN — DIVALPROEX SODIUM 125 MG: 125 CAPSULE, COATED PELLETS ORAL at 05:36

## 2019-10-30 RX ADMIN — RANOLAZINE 500 MG: 500 TABLET, EXTENDED RELEASE ORAL at 05:36

## 2019-10-30 RX ADMIN — VANCOMYCIN HYDROCHLORIDE 125 MG: 10 INJECTION, POWDER, LYOPHILIZED, FOR SOLUTION INTRAVENOUS at 05:37

## 2019-10-30 RX ADMIN — VANCOMYCIN HYDROCHLORIDE 125 MG: 10 INJECTION, POWDER, LYOPHILIZED, FOR SOLUTION INTRAVENOUS at 11:34

## 2019-10-30 RX ADMIN — ESCITALOPRAM OXALATE 20 MG: 10 TABLET ORAL at 05:36

## 2019-10-30 RX ADMIN — DILTIAZEM HYDROCHLORIDE 120 MG: 120 CAPSULE, COATED, EXTENDED RELEASE ORAL at 05:37

## 2019-10-30 NOTE — DISCHARGE SUMMARY
Discharge Summary    CHIEF COMPLAINT ON ADMISSION  Chief Complaint   Patient presents with   • Abnormal Labs       Reason for Admission  Abnormal Lab     CODE STATUS  Full Code    HPI & HOSPITAL COURSE  This is a 86 y.o. male with past medical history of Alzheimer's dementia who is a resident of nursing home facility in the memory care unit comes in for positive C. difficile test.  Patient with 2 week history of diarrhea.  He had been on vancomycin and diarrhea reportedly had improved prior to him presenting to the ED. outpatient testing for C. difficile antibody came back positive so patient was sent back to the hospital despite that he has no diarrhea.  Was admitted to the hospital and oral vancomycin was continued as per protocol.  No diarrhea has been noted during this hospital stay.  At this point, patient is not infectious and his C. difficile diarrhea is well controlled with the treatment he is receiving.  He will complete a course of 14 days of oral vancomycin total.  He was hemodynamically and clinically stable.  The facility was contacted again and reassured that this patient's infection is under control.  He was accepted to return.  He was cleared for discharge from medical standpoint.    Therefore, he is discharged in guarded and stable condition to skilled nursing facility.    The patient met 2-midnight criteria for an inpatient stay at the time of discharge.      FOLLOW UP ITEMS POST DISCHARGE  Follow-up with PCP at SNF as per recommendations    DISCHARGE DIAGNOSES  Principal Problem:    Clostridium difficile infection POA: Unknown  Active Problems:    Coronary artery disease involving native coronary artery of native heart without angina pectoris POA: Yes    Deep vein thrombosis (HCC) [I82.409] POA: Yes    Late onset Alzheimer's disease with behavioral disturbance (HCC) POA: Yes  Resolved Problems:    * No resolved hospital problems. *      FOLLOW UP  Future Appointments   Date Time Provider  Department Center   11/6/2019 11:30 AM Ced Horton M.D. [facility] None   1/27/2020 11:00 AM Sourav Stevenson D.O. [facility] None     No follow-up provider specified.    MEDICATIONS ON DISCHARGE     Medication List      START taking these medications      Instructions   vancomycin 50 mg/mL 50 mg/mL Soln   Take 2.5 mL by mouth every 6 hours for 9 days.  Dose:  125 mg        CONTINUE taking these medications      Instructions   acetaminophen 500 MG Tabs  Commonly known as:  TYLENOL   Take 500 mg by mouth every 6 hours as needed for Mild Pain.  Dose:  500 mg     DILTIAZem  MG Cp24  Commonly known as:  CARDIZEM CD   Take 120 mg by mouth every day.  Dose:  120 mg     Divalproex Sodium 125 MG Csdr  Commonly known as:  DEPAKOTE   Take 125 mg by mouth every day.  Dose:  125 mg     escitalopram 20 MG tablet  Commonly known as:  LEXAPRO   Take 20 mg by mouth every day.  Dose:  20 mg     ferrous sulfate 325 (65 Fe) MG tablet   Take 325 mg by mouth every day.  Dose:  325 mg     loperamide 2 MG Caps  Commonly known as:  IMODIUM   Take 2 mg by mouth 4 times a day as needed for Diarrhea.  Dose:  2 mg     memantine 10 MG Tabs  Commonly known as:  NAMENDA   Take 10 mg by mouth 2 times a day.  Dose:  10 mg     QUEtiapine 25 MG Tabs  Commonly known as:  SEROQUEL   Take 75 mg by mouth 2 Times a Day.  Dose:  75 mg     ranolazine 500 MG Tb12  Commonly known as:  RANEXA   Take 500 mg by mouth 2 times a day.  Dose:  500 mg     tramadol 50 MG Tabs  Commonly known as:  ULTRAM   Take 50 mg by mouth every 8 hours as needed for Moderate Pain.  Dose:  50 mg     traZODone 100 MG Tabs  Commonly known as:  DESYREL   Take 100 mg by mouth every evening.  Dose:  100 mg     vitamin D 2000 UNIT Tabs   Take 2,000 Units by mouth every day.  Dose:  2,000 Units     XARELTO 20 MG Tabs tablet  Generic drug:  rivaroxaban   Take 20 mg by mouth with dinner.  Dose:  20 mg            Allergies  Allergies   Allergen Reactions   • Spironolactone Diarrhea      Severe diarrhea       DIET  Orders Placed This Encounter   Procedures   • Diet Order Regular     Standing Status:   Standing     Number of Occurrences:   1     Order Specific Question:   Diet:     Answer:   Regular [1]   • Discontinue Diet Tray     Standing Status:   Standing     Number of Occurrences:   1       ACTIVITY  As tolerated.  Weight bearing as tolerated    LINES, DRAINS, AND WOUNDS  This is an automated list. Peripheral IVs will be removed prior to discharge.                     MENTAL STATUS ON TRANSFER  Level of Consciousness: Confused  Orientation : Disoriented to Event, Disoriented to Place, Disoriented to Time  Speech: Speech Clear    CONSULTATIONS  None    PROCEDURES  None    LABORATORY  Lab Results   Component Value Date    SODIUM 137 10/28/2019    POTASSIUM 4.4 10/28/2019    CHLORIDE 103 10/28/2019    CO2 28 10/28/2019    GLUCOSE 104 (H) 10/28/2019    BUN 16 10/28/2019    CREATININE 1.32 10/28/2019        Lab Results   Component Value Date    WBC 7.5 10/30/2019    HEMOGLOBIN 15.1 10/30/2019    HEMATOCRIT 46.2 10/30/2019    PLATELETCT 310 10/30/2019        Total time of the discharge process exceeds 38 minutes.

## 2019-10-30 NOTE — DISCHARGE PLANNING
Anticipated Discharge Disposition: Return to [facility]    Action: Notified [facility] that the patient is medically cleared. Spoke with Natalie at [facility] and she stated that they are ready to accept the patient back.  Outreach to patient's son Gerardo who is going to  the patient at around noon today and take him to [facility].  Bedside nurse notified.    Barriers to Discharge: none    Plan: will continue to follow for any case managements needs/barriers.

## 2019-10-30 NOTE — PROGRESS NOTES
Pt discharged back to [facility] Memory care unit. Pts son providing transport, wheelchair escort provided to vehicle.   Prescription for Vancomycin (dose, course) clarified w/ Dr. Juarez. Local pharmacy not able to provide vanco solution but able to provide capsules of prescribed dose. Pt has tolerated PO medications while inpatient. Order changed from solution to 250mg capsules x9days. Prescription sent to pts preferred pharmacy at [facility].   AVS reviewed w/ pts son as pt has dementia and no readiness to engage in discharge teaching. I attempted to call pts facility 2x, not reaching an RN on either attempt. Call back number and name was provided to reception in case of questions. Additional AVS provided to pts son for facility reference. All family questions answered.     Merry Prather

## 2019-10-30 NOTE — CARE PLAN
Problem: Safety  Goal: Will remain free from falls  Outcome: PROGRESSING AS EXPECTED  Intervention: Implement fall precautions  Flowsheets  Taken 10/29/2019 2159  Bed Alarm: Yes - Alarm On  Taken 10/29/2019 2100  Environmental Precautions: Treaded Slipper Socks on Patient;Personal Belongings, Wastebasket, Call Bell etc. in Easy Reach;Bed in Low Position  Note:   Pt is oriented to self only. Reoriented pt to situation and place as needed. Bed alarm on for safety. Three side rails up. Bed locked in lowest position. Treaded socks in place. Pt room close to nursing station.      Problem: Skin Integrity  Goal: Risk for impaired skin integrity will decrease  Outcome: PROGRESSING AS EXPECTED  Intervention: Implement precautions to protect skin integrity in collaboration with the interdisciplinary team  Flowsheets  Taken 10/29/2019 2100  Skin Preventative Measures: Waffle Cushion;Pillows in Use to Float Heels;Pillows in Use for Support / Positioning  Friction Interventions: Draw Sheet / Pad Used for Repositioning  Patient Turns / Repositioning: Patient Turns Self from Side to Side  Moisturizers: Barrier Cream  Patient is Receiving Nutrition: Oral Intake Adequate  Taken 10/29/2019 2159  Bed Types: Pressure Redistribution Mattress (Atmosair)  Note:   Waffle mattress in place. Pillows in use for support and positioning, heels floated. Pt turns self from side to side. Barrier cream in use.

## 2019-10-30 NOTE — DISCHARGE INSTRUCTIONS
Discharge Instructions    Discharged to other by car with relative. Discharged via wheelchair, hospital escort: Yes.  Special equipment needed: Not Applicable    Be sure to schedule a follow-up appointment with your primary care doctor or any specialists as instructed.     Discharge Plan:   Diet Plan: Discussed  Activity Level: Discussed  Confirmed Follow up Appointment: No (Comments)  Confirmed Symptoms Management: Discussed  Medication Reconciliation Updated: Yes    I understand that a diet low in cholesterol, fat, and sodium is recommended for good health. Unless I have been given specific instructions below for another diet, I accept this instruction as my diet prescription.   Other diet: Regular    Special Instructions: None    · Is patient discharged on Warfarin / Coumadin?   No     Depression / Suicide Risk    As you are discharged from this [facility] facility, it is important to learn how to keep safe from harming yourself.    Recognize the warning signs:  · Abrupt changes in personality, positive or negative- including increase in energy   · Giving away possessions  · Change in eating patterns- significant weight changes-  positive or negative  · Change in sleeping patterns- unable to sleep or sleeping all the time   · Unwillingness or inability to communicate  · Depression  · Unusual sadness, discouragement and loneliness  · Talk of wanting to die  · Neglect of personal appearance   · Rebelliousness- reckless behavior  · Withdrawal from people/activities they love  · Confusion- inability to concentrate     If you or a loved one observes any of these behaviors or has concerns about self-harm, here's what you can do:  · Talk about it- your feelings and reasons for harming yourself  · Remove any means that you might use to hurt yourself (examples: pills, rope, extension cords, firearm)  · Get professional help from the community (Mental Health, Substance Abuse, psychological counseling)  · Do not be  alone:Call your Safe Contact- someone whom you trust who will be there for you.  · Call your local CRISIS HOTLINE 593-8291 or 953-633-6206  · Call your local Children's Mobile Crisis Response Team [place] (358) 351-9380 or www.[employer]  · Call the toll free National Suicide Prevention Hotlines   · National Suicide Prevention Lifeline 357-745-PSUD (1597)  · National Genetic Finance Line Network 800-SUICIDE (338-6434)

## 2019-10-30 NOTE — PROGRESS NOTES
Reviewed pt's recent duplex. No evidence of acute VTE noted. Spoke with pt's son. Will plan to stop Xarelto and start aspirin 81 mg daily. Reminded about the importance of close surveillance for future VTE and when to seek immediate medical attention.       The following information was faxed to [facility] at [phone]:    - Stop Xarelto  - Start aspirin 81 mg by mouth daily  - go to the ER for shortness of breath, chest pain, pain with deep inhalation, worsening leg swelling and/or pain in calf or leg   - avoid sedentary periods  - elevate legs as much as possible, use compression stockings/socks if directed by your provider  - if any bleeding lasting 30min without stopping, please seek care with your PCP, urgent care, or ED  - if having any invasive procedure, please make sure the doctor knows of your history of blood clots   - see your PCP to discuss if you need age-appropriate cancer screenings as a small % of blood clots may be caused by an underlying malignancy      Will discharge pt from anticoagulation clinic. He is to follow up with PCP.    Tianna Tao APRN  [phone]    Cc:  Dr Bloch Dr Sundstrom

## 2019-10-31 ENCOUNTER — PATIENT OUTREACH (OUTPATIENT)
Dept: HEALTH INFORMATION MANAGEMENT | Facility: OTHER | Age: 84
End: 2019-10-31

## 2019-11-05 ENCOUNTER — PATIENT OUTREACH (OUTPATIENT)
Dept: HEALTH INFORMATION MANAGEMENT | Facility: OTHER | Age: 84
End: 2019-11-05

## 2019-11-18 DIAGNOSIS — R19.7 DIARRHEA, UNSPECIFIED TYPE: ICD-10-CM

## 2019-11-29 ENCOUNTER — PATIENT OUTREACH (OUTPATIENT)
Dept: HEALTH INFORMATION MANAGEMENT | Facility: OTHER | Age: 84
End: 2019-11-29

## 2019-12-02 DIAGNOSIS — G30.1 LATE ONSET ALZHEIMER'S DISEASE WITH BEHAVIORAL DISTURBANCE (HCC): ICD-10-CM

## 2019-12-02 DIAGNOSIS — F02.818 LATE ONSET ALZHEIMER'S DISEASE WITH BEHAVIORAL DISTURBANCE (HCC): ICD-10-CM

## 2019-12-03 RX ORDER — ESCITALOPRAM OXALATE 20 MG/1
TABLET ORAL
Qty: 90 TAB | Refills: 2 | Status: SHIPPED | OUTPATIENT
Start: 2019-12-03

## 2019-12-11 ENCOUNTER — TELEPHONE (OUTPATIENT)
Dept: MEDICAL GROUP | Facility: LAB | Age: 84
End: 2019-12-11

## 2019-12-11 DIAGNOSIS — M79.604 PAIN IN BOTH LOWER EXTREMITIES: ICD-10-CM

## 2019-12-11 DIAGNOSIS — M79.605 PAIN IN BOTH LOWER EXTREMITIES: ICD-10-CM

## 2019-12-11 RX ORDER — TRAMADOL HYDROCHLORIDE 50 MG/1
50 TABLET ORAL EVERY 8 HOURS PRN
Qty: 60 TAB | Refills: 0 | Status: SHIPPED
Start: 2019-12-11 | End: 2020-01-10

## 2019-12-11 NOTE — TELEPHONE ENCOUNTER
1. Caller Name: Gerardo                                         Call Back Number: [phone]      Patient approves a detailed voicemail message: yes    Pt is having intermittent bilateral leg pain.  He is choosing to not walk.  Gerardo is asking if you could prescribe Tramadol again for him, this works a little better than the tylenol.  Please advise.

## 2019-12-11 NOTE — PROGRESS NOTES
An 86-year-old male was an emergent admission to [facility] from 10/26/2019 to 10/30/2019 to treat Other bacterial infections of unspecified site. IHD visited the patient bedside. The patient was discharged Home.     The patient was ordered to start/continue to take the following medication: Vancomycin Hydrochloride (Vancocin). The patient successfully filled all medications.  The patient was ordered to follow-up with PCP. The patient had the following appointments:     1) 11/6/2019 @ 11:30 Ced Horton, internal medicine - PATIENT RESCHEDULED     2) 11/18/2019 @ 11:00 Sourav Stevenson internal medicine - PATIENT RESCHEDULED     3) 11/25/2019 @ 11:20 Sourav Stevenson internal medicine - PATIENT RESCHEDULED    The patient has future appointments scheduled for:     1) 1/6/2020 @ 10:30 Ced Horton, internal medicine - SCHEDULED     2) 1/27/2020 @ 11:00 Sourav Stevenson internal medicine - SCHEDULED    IHD followed the patient for a total of 35 days and Patient became non-responsive to IHD outreaches. PPS screening was conducted at 80%.

## 2020-01-06 ENCOUNTER — OFFICE VISIT (OUTPATIENT)
Dept: CARDIOLOGY | Facility: MEDICAL CENTER | Age: 85
End: 2020-01-06
Payer: MEDICARE

## 2020-01-06 VITALS
SYSTOLIC BLOOD PRESSURE: 134 MMHG | DIASTOLIC BLOOD PRESSURE: 84 MMHG | OXYGEN SATURATION: 92 % | WEIGHT: 195 LBS | HEIGHT: 68 IN | HEART RATE: 74 BPM | BODY MASS INDEX: 29.55 KG/M2

## 2020-01-06 DIAGNOSIS — R41.0 DELIRIUM WITH DEMENTIA: ICD-10-CM

## 2020-01-06 DIAGNOSIS — D62 ACUTE BLOOD LOSS AS CAUSE OF POSTOPERATIVE ANEMIA: ICD-10-CM

## 2020-01-06 DIAGNOSIS — G30.1 LATE ONSET ALZHEIMER'S DISEASE WITH BEHAVIORAL DISTURBANCE (HCC): ICD-10-CM

## 2020-01-06 DIAGNOSIS — R79.89 ABNORMAL CBC: ICD-10-CM

## 2020-01-06 DIAGNOSIS — E78.00 PURE HYPERCHOLESTEROLEMIA: ICD-10-CM

## 2020-01-06 DIAGNOSIS — I20.89 ANGINA AT REST (HCC): ICD-10-CM

## 2020-01-06 DIAGNOSIS — I25.10 CORONARY ARTERY DISEASE INVOLVING NATIVE CORONARY ARTERY OF NATIVE HEART WITHOUT ANGINA PECTORIS: ICD-10-CM

## 2020-01-06 DIAGNOSIS — F02.818 LATE ONSET ALZHEIMER'S DISEASE WITH BEHAVIORAL DISTURBANCE (HCC): ICD-10-CM

## 2020-01-06 PROCEDURE — 99214 OFFICE O/P EST MOD 30 MIN: CPT | Performed by: INTERNAL MEDICINE

## 2020-01-06 ASSESSMENT — ENCOUNTER SYMPTOMS
NEUROLOGICAL NEGATIVE: 1
SHORTNESS OF BREATH: 0
FEVER: 0
CHILLS: 0
PND: 0
CONSTITUTIONAL NEGATIVE: 1
DIZZINESS: 0
MUSCULOSKELETAL NEGATIVE: 1
PALPITATIONS: 0
WEAKNESS: 0
CARDIOVASCULAR NEGATIVE: 1
STRIDOR: 0
SORE THROAT: 0
LOSS OF CONSCIOUSNESS: 0
EYES NEGATIVE: 1
WHEEZING: 0
COUGH: 0
HEMOPTYSIS: 0
SPUTUM PRODUCTION: 0
GASTROINTESTINAL NEGATIVE: 1
BRUISES/BLEEDS EASILY: 0
CLAUDICATION: 0
MEMORY LOSS: 1
ORTHOPNEA: 0
RESPIRATORY NEGATIVE: 1

## 2020-01-06 NOTE — PROGRESS NOTES
Chief Complaint   Patient presents with   • Coronary Artery Disease       Subjective:   Joshua Nieves is a 86 y.o. male who presents today as a follow-up for his at least moderate Alzheimer's, CAD, delirium, angina, hyperlipidemia.  Since he was last seen his memory continues to get worse.  His son is concerned about this.  He says he has a good days and bad days and occasionally has trouble recognizing them.  He continues on the aspirin diltiazem and Ranexa.  We have not put him on a cholesterol-lowering medication to the facility given his advanced dementia.  He is having no reported chest pain.    Past Medical History:   Diagnosis Date   • Abnormal CBC 2017   • HTN (hypertension) 2019   • Late onset Alzheimer's disease with behavioral disturbance (HCC) 2018   • Vitamin D deficiency disease 2017     Past Surgical History:   Procedure Laterality Date   • HIP CANNULATED SCREW Right 2019    Procedure: FIXATION, HIP, USING CANNULATED SCREW;  Surgeon: Rufus Parrish M.D.;  Location: SURGERY [facility];  Service: Orthopedics   • OTHER CARDIAC SURGERY      stent placement     No family history on file.  Social History     Socioeconomic History   • Marital status:      Spouse name: Not on file   • Number of children: Not on file   • Years of education: Not on file   • Highest education level: Not on file   Occupational History   • Not on file   Social Needs   • Financial resource strain: Not on file   • Food insecurity:     Worry: Not on file     Inability: Not on file   • Transportation needs:     Medical: Not on file     Non-medical: Not on file   Tobacco Use   • Smoking status: Former Smoker     Last attempt to quit: 1987     Years since quittin.0   • Smokeless tobacco: Never Used   Substance and Sexual Activity   • Alcohol use: No   • Drug use: No   • Sexual activity: Never   Lifestyle   • Physical activity:     Days per week: Not on file     Minutes per session:  Not on file   • Stress: Not on file   Relationships   • Social connections:     Talks on phone: Not on file     Gets together: Not on file     Attends Voodoo service: Not on file     Active member of club or organization: Not on file     Attends meetings of clubs or organizations: Not on file     Relationship status: Not on file   • Intimate partner violence:     Fear of current or ex partner: Not on file     Emotionally abused: Not on file     Physically abused: Not on file     Forced sexual activity: Not on file   Other Topics Concern   • Not on file   Social History Narrative   • Not on file     Allergies   Allergen Reactions   • Spironolactone Diarrhea     Severe diarrhea     Outpatient Encounter Medications as of 1/6/2020   Medication Sig Dispense Refill   • tramadol (ULTRAM) 50 MG Tab Take 1 Tab by mouth every 8 hours as needed for up to 30 days. 60 Tab 0   • escitalopram (LEXAPRO) 20 MG tablet TAKE ONE TABLET BY MOUTH ONE TIME DAILY  90 Tab 2   • DILTIAZem CD (CARDIZEM CD) 120 MG CAPSULE SR 24 HR Take 120 mg by mouth every day.     • ferrous sulfate 325 (65 Fe) MG tablet Take 325 mg by mouth every day.     • traZODone (DESYREL) 100 MG Tab Take 100 mg by mouth every evening.     • memantine (NAMENDA) 10 MG Tab Take 10 mg by mouth 2 times a day.     • QUEtiapine (SEROQUEL) 25 MG Tab Take 75 mg by mouth 2 Times a Day.     • ranolazine (RANEXA) 500 MG TABLET SR 12 HR Take 500 mg by mouth 2 times a day.     • acetaminophen (TYLENOL) 500 MG Tab Take 500 mg by mouth every 6 hours as needed for Mild Pain.     • Cholecalciferol (VITAMIN D) 2000 UNIT Tab Take 2,000 Units by mouth every day.     • aspirin (ASA) 81 MG Chew Tab chewable tablet Take 81 mg by mouth every day.     • [DISCONTINUED] Divalproex Sodium (DEPAKOTE) 125 MG Capsule Delayed Release Sprinkle Take 125 mg by mouth every day.     • [DISCONTINUED] escitalopram (LEXAPRO) 20 MG tablet Take 20 mg by mouth every day.     • [DISCONTINUED] loperamide  "(IMODIUM) 2 MG Cap Take 2 mg by mouth 4 times a day as needed for Diarrhea.     • [DISCONTINUED] tramadol (ULTRAM) 50 MG Tab Take 50 mg by mouth every 8 hours as needed for Moderate Pain.       No facility-administered encounter medications on file as of 1/6/2020.      Review of Systems   Constitutional: Negative.  Negative for chills, fever and malaise/fatigue.   HENT: Negative.  Negative for sore throat.    Eyes: Negative.    Respiratory: Negative.  Negative for cough, hemoptysis, sputum production, shortness of breath, wheezing and stridor.    Cardiovascular: Negative.  Negative for chest pain, palpitations, orthopnea, claudication, leg swelling and PND.   Gastrointestinal: Negative.    Genitourinary: Negative.    Musculoskeletal: Negative.    Skin: Negative.    Neurological: Negative.  Negative for dizziness, loss of consciousness and weakness.   Endo/Heme/Allergies: Negative.  Does not bruise/bleed easily.   Psychiatric/Behavioral: Positive for memory loss.   All other systems reviewed and are negative.       Objective:   /84 (BP Location: Left arm, Patient Position: Sitting, BP Cuff Size: Adult)   Pulse 74   Ht 1.727 m (5' 8\")   Wt 88.5 kg (195 lb)   SpO2 92%   BMI 29.65 kg/m²     Physical Exam   Constitutional: He appears well-developed and well-nourished. No distress.   HENT:   Head: Normocephalic and atraumatic.   Right Ear: External ear normal.   Left Ear: External ear normal.   Nose: Nose normal.   Mouth/Throat: No oropharyngeal exudate.   Eyes: Pupils are equal, round, and reactive to light. Conjunctivae and EOM are normal. Right eye exhibits no discharge. Left eye exhibits no discharge. No scleral icterus.   Neck: Neck supple. No JVD present.   Cardiovascular: Normal rate, regular rhythm and intact distal pulses. Exam reveals no gallop and no friction rub.   No murmur heard.  Pulmonary/Chest: Effort normal. No stridor. No respiratory distress. He has no wheezes. He has no rales. He exhibits " no tenderness.   Abdominal: Soft. He exhibits no distension. There is no guarding.   Musculoskeletal: Normal range of motion.         General: No tenderness, deformity or edema.   Neurological: He is alert. He has normal reflexes. He displays normal reflexes. No cranial nerve deficit. He exhibits normal muscle tone. Coordination normal.   Skin: Skin is warm and dry. No rash noted. He is not diaphoretic. No erythema. No pallor.   Psychiatric: He has a normal mood and affect. His behavior is normal. Judgment and thought content normal.   Nursing note and vitals reviewed.      Assessment:     1. Angina at rest (HCC)     2. Abnormal CBC     3. Acute blood loss as cause of postoperative anemia     4. Coronary artery disease involving native coronary artery of native heart without angina pectoris     5. Delirium with dementia     6. Late onset Alzheimer's disease with behavioral disturbance (HCC)     7. Pure hypercholesterolemia         Medical Decision Making:  Today's Assessment / Status / Plan:     86-year-old male with CAD angina delirium and hyperlipidemia.  Again putting him on a cholesterol-lowering medication seems futile.  We will not push his medical therapy.  He will stay on the ranolazine aspirin and diltiazem.  We will see him back in 6 months.  We will see him back sooner if he has any changes to his medical status.  I did check to make sure that he does have an advanced directive on file which she does.

## 2020-01-07 ENCOUNTER — TELEPHONE (OUTPATIENT)
Dept: MEDICAL GROUP | Facility: LAB | Age: 85
End: 2020-01-07

## 2020-01-07 DIAGNOSIS — S91.309A WOUND OF FOOT: ICD-10-CM

## 2020-01-07 DIAGNOSIS — R41.0 DELIRIUM WITH DEMENTIA: ICD-10-CM

## 2020-01-07 NOTE — TELEPHONE ENCOUNTER
Ana:  Call patient's group home, referral is been made to home health to evaluate!  Regards, Sourav Stevenson, DO

## 2020-01-07 NOTE — TELEPHONE ENCOUNTER
1. Caller Name: Natalie at [facility]                                         Call Back Number:       Patient approves a detailed voicemail message: N\A    Pt's group home is requesting a home health eval for Joshua.  He has an open wound on his right foot.

## 2020-01-08 RX ORDER — TRAZODONE HYDROCHLORIDE 100 MG/1
TABLET ORAL
Qty: 30 TAB | Refills: 9 | Status: SHIPPED | OUTPATIENT
Start: 2020-01-08

## 2020-01-13 ENCOUNTER — TELEPHONE (OUTPATIENT)
Dept: MEDICAL GROUP | Facility: LAB | Age: 85
End: 2020-01-13

## 2020-01-13 DIAGNOSIS — M79.671 RIGHT FOOT PAIN: ICD-10-CM

## 2020-01-13 NOTE — TELEPHONE ENCOUNTER
Ana:  Please call [facility] in order for an x-ray of Joshua's right foot has been ordered!  Regards, Sourav Stevenson, DO

## 2020-01-13 NOTE — TELEPHONE ENCOUNTER
1. Caller Name: JASON Marlow at [facility]                                         Call Back Number: [phone]      Patient approves a detailed voicemail message: yes    [facility] has admitted Joshua for a right foot wound.  There is a lot of pain associated with the wound, no discharge from it.  She is asking if you'd like an xray on the foot?

## 2020-01-16 ENCOUNTER — TELEPHONE (OUTPATIENT)
Dept: MEDICAL GROUP | Facility: LAB | Age: 85
End: 2020-01-16

## 2020-01-16 ENCOUNTER — ANTICOAGULATION MONITORING (OUTPATIENT)
Dept: VASCULAR LAB | Facility: MEDICAL CENTER | Age: 85
End: 2020-01-16

## 2020-01-16 NOTE — TELEPHONE ENCOUNTER
"· Home Health paperwork received from [facility] requiring provider signature.     · All appropriate fields completed by Medical Assistant: No    · Paperwork placed in \"MA to Provider\" folder/basket. Awaiting provider completion/signature.  "

## 2020-01-17 ENCOUNTER — TELEPHONE (OUTPATIENT)
Dept: MEDICAL GROUP | Facility: LAB | Age: 85
End: 2020-01-17

## 2020-01-17 DIAGNOSIS — N30.00 ACUTE CYSTITIS WITHOUT HEMATURIA: ICD-10-CM

## 2020-01-17 NOTE — TELEPHONE ENCOUNTER
1. Caller Name: Ele [facility]                                         Call Back Number: [phone]      Patient approves a detailed voicemail message: yes    Pt's home health nurse is asking for a u/a order.  He has some extra agitation today and she is wanting to rule out underlying inf.  Please fax order to [facility].

## 2020-01-18 NOTE — TELEPHONE ENCOUNTER
Ana:  Agree with doing a urinalysis order written please fax to home care, notify them by phone please.   Regards, Sourav Stevenson, DO

## 2020-02-03 ENCOUNTER — TELEPHONE (OUTPATIENT)
Dept: MEDICAL GROUP | Facility: LAB | Age: 85
End: 2020-02-03

## 2020-02-03 NOTE — TELEPHONE ENCOUNTER
Joleen:  Scheduled for this afternoon or in the next day or so!  Regards, Sourav Stevenson, DO

## 2020-02-03 NOTE — TELEPHONE ENCOUNTER
Son said ptdoes not need an earlier appt before March. He is doing fine. He has fallen out of bed and has a black and blue eye. He is participating in group.

## 2020-02-03 NOTE — TELEPHONE ENCOUNTER
1. Caller Name  Ele/ [facility]                       Call Back Number: [phone]      How would the patient prefer to be contacted with a response: Phone call do NOT leave a detailed message    Ele from [facility] seen pt, he had a fall on Thursday. Has a bruise on the left orbital. Pt was not seen . Ele said pt is stable. Maybe he can come in the office to be seen

## 2020-02-27 ENCOUNTER — TELEPHONE (OUTPATIENT)
Dept: MEDICAL GROUP | Facility: LAB | Age: 85
End: 2020-02-27

## 2020-02-27 DIAGNOSIS — L24.A9 WOUND DRAINAGE: ICD-10-CM

## 2020-02-27 RX ORDER — CALCIUM ALGINATE 4" X 4"
1 BANDAGE TOPICAL DAILY
Qty: 15 EACH | Refills: 1 | Status: SHIPPED
Start: 2020-02-27 | End: 2020-03-04 | Stop reason: SDUPTHER

## 2020-02-27 NOTE — TELEPHONE ENCOUNTER
1. Caller Name: Ele Del Castillo                        Call Back Number: [phone]      How would the patient prefer to be contacted with a response: Phone call OK to leave a detailed message     Right foot wound not healing, home health wound care is not helping, he needs debridement.  Please write an order for  Alginate to protect the wound from infection until he's able to get in to an out pt wound care.  Pt is in a lot of pain.  [phone] is the fax number for the order.

## 2020-03-02 ENCOUNTER — OFFICE VISIT (OUTPATIENT)
Dept: MEDICAL GROUP | Facility: LAB | Age: 85
End: 2020-03-02
Payer: MEDICARE

## 2020-03-02 VITALS — DIASTOLIC BLOOD PRESSURE: 60 MMHG | HEART RATE: 74 BPM | SYSTOLIC BLOOD PRESSURE: 140 MMHG | OXYGEN SATURATION: 87 %

## 2020-03-02 DIAGNOSIS — G30.1 LATE ONSET ALZHEIMER'S DISEASE WITH BEHAVIORAL DISTURBANCE (HCC): ICD-10-CM

## 2020-03-02 DIAGNOSIS — F02.818 LATE ONSET ALZHEIMER'S DISEASE WITH BEHAVIORAL DISTURBANCE (HCC): ICD-10-CM

## 2020-03-02 PROCEDURE — 99213 OFFICE O/P EST LOW 20 MIN: CPT | Performed by: INTERNAL MEDICINE

## 2020-03-03 ENCOUNTER — TELEPHONE (OUTPATIENT)
Dept: MEDICAL GROUP | Facility: LAB | Age: 85
End: 2020-03-03

## 2020-03-03 DIAGNOSIS — L24.A9 WOUND DRAINAGE: ICD-10-CM

## 2020-03-03 NOTE — TELEPHONE ENCOUNTER
Alginate order needs to be 2 x / week instead of daily.  The wound is not draining enough to have to be changed daily.  Can you please re write the Rx and I will fax it to [phone].

## 2020-03-04 RX ORDER — CALCIUM ALGINATE 4" X 4"
1 BANDAGE TOPICAL
Qty: 8 EACH | Refills: 3 | Status: SHIPPED
Start: 2020-03-04

## 2020-03-04 NOTE — PROGRESS NOTES
"CC: Joshua Nieves is a 86 y.o. male is suffering from   Chief Complaint   Patient presents with   • Other     follow up         SUBJECTIVE:  1. Late onset Alzheimer's disease with behavioral disturbance (HCC)  Joshua is here for follow-up, Ced is answering his questions father is not lucid        Past social, family, history:   Social History     Tobacco Use   • Smoking status: Former Smoker     Last attempt to quit: 1987     Years since quittin.1   • Smokeless tobacco: Never Used   Substance Use Topics   • Alcohol use: No   • Drug use: No         MEDICATIONS:    Current Outpatient Medications:   •  Calcium Alginate (MICK CA ALGINATE 4\"X4\") Misc, 1 Each by Apply externally route every day for 15 days., Disp: 15 Each, Rfl: 1  •  traZODone (DESYREL) 100 MG Tab, TAKE ONE TABLET BY MOUTH AT BEDTIME , Disp: 30 Tab, Rfl: 9  •  escitalopram (LEXAPRO) 20 MG tablet, TAKE ONE TABLET BY MOUTH ONE TIME DAILY , Disp: 90 Tab, Rfl: 2  •  aspirin (ASA) 81 MG Chew Tab chewable tablet, Take 81 mg by mouth every day., Disp: , Rfl:   •  DILTIAZem CD (CARDIZEM CD) 120 MG CAPSULE SR 24 HR, Take 120 mg by mouth every day., Disp: , Rfl:   •  ferrous sulfate 325 (65 Fe) MG tablet, Take 325 mg by mouth every day., Disp: , Rfl:   •  memantine (NAMENDA) 10 MG Tab, Take 10 mg by mouth 2 times a day., Disp: , Rfl:   •  QUEtiapine (SEROQUEL) 25 MG Tab, Take 75 mg by mouth 2 Times a Day., Disp: , Rfl:   •  ranolazine (RANEXA) 500 MG TABLET SR 12 HR, Take 500 mg by mouth 2 times a day., Disp: , Rfl:   •  acetaminophen (TYLENOL) 500 MG Tab, Take 500 mg by mouth every 6 hours as needed for Mild Pain., Disp: , Rfl:   •  Cholecalciferol (VITAMIN D) 2000 UNIT Tab, Take 2,000 Units by mouth every day., Disp: , Rfl:     PROBLEMS:  Patient Active Problem List    Diagnosis Date Noted   • Clostridium difficile infection 10/26/2019     Priority: High   • Closed fracture of neck of right femur (HCC) 2019     Priority: " High   • Delirium with dementia 04/11/2019     Priority: Medium   • Acute blood loss as cause of postoperative anemia 04/11/2019     Priority: Medium   • Acute cystitis without hematuria 04/09/2019     Priority: Medium   • Vitamin D deficiency disease 08/04/2017     Priority: Medium   • Late onset Alzheimer's disease with behavioral disturbance (HCC) 01/31/2018     Priority: Low   • Angina at rest (HCC) 08/08/2017   • Coronary artery disease involving native coronary artery of native heart without angina pectoris 08/08/2017   • Pure hypercholesterolemia 08/08/2017   • Abnormal CBC 08/04/2017       REVIEW OF SYSTEMS:  Unable to assess  PHYSICAL EXAM   Constitutional: Alert, cooperative, not in acute distress.  Cardiovascular:  Rate Rhythm is regular without murmurs rubs clicks.     Thorax & Lungs: Clear to auscultation, no wheezing, rhonchi, or rales  HENT: Normocephalic, Atraumatic.  Eyes: PERRLA, EOMI, Conjunctiva normal.   Neck: Trachia is midline no swelling of the thyroid.   Lymphatic: No lymphadenopathy noted.   Neurologic: Alert unable to assess orientation  Psychiatric: Severe dementia.     VITAL SIGNS:/60   Pulse 74   SpO2 (!) 87%     Labs: Reviewed    Assessment:                                                     Plan:    1. Late onset Alzheimer's disease with behavioral disturbance (HCC)  Patient with Alzheimer's dementia (severe) at this juncture no change in medical therapy continue wound therapy right foot.

## 2020-03-08 DIAGNOSIS — I25.10 CORONARY ARTERY DISEASE INVOLVING NATIVE CORONARY ARTERY OF NATIVE HEART WITHOUT ANGINA PECTORIS: ICD-10-CM

## 2020-03-10 RX ORDER — RANOLAZINE 500 MG/1
TABLET, FILM COATED, EXTENDED RELEASE ORAL
Qty: 180 TAB | Refills: 2 | Status: SHIPPED | OUTPATIENT
Start: 2020-03-10

## 2020-03-31 ENCOUNTER — TELEPHONE (OUTPATIENT)
Dept: MEDICAL GROUP | Facility: LAB | Age: 85
End: 2020-03-31

## 2020-03-31 NOTE — TELEPHONE ENCOUNTER
1. Caller Name: Ele Del Castillo                     Call Back Number: [phone]      How would the patient prefer to be contacted with a response:   Ele Del Castillo would like you to call her regarding pt.

## 2020-04-02 ENCOUNTER — TELEPHONE (OUTPATIENT)
Dept: MEDICAL GROUP | Facility: LAB | Age: 85
End: 2020-04-02

## 2020-04-03 DIAGNOSIS — S91.309A WOUND OF FOOT: ICD-10-CM

## 2020-04-03 NOTE — TELEPHONE ENCOUNTER
Joleen:  Please have home care fax over orders so that they can be signed.!  I have verbally authorized that they take action!  Regards, Sourav Stevenson, DO

## 2020-04-07 ENCOUNTER — TELEPHONE (OUTPATIENT)
Dept: MEDICAL GROUP | Facility: LAB | Age: 85
End: 2020-04-07

## 2020-04-07 NOTE — TELEPHONE ENCOUNTER
[facility] said they are not seeing any pts at the wound clinic so they will continue regular wound care MARGARET

## 2020-04-07 NOTE — TELEPHONE ENCOUNTER
Porsche:  Sounds acceptable!  Let me know if there is anything else we need to do!  Regards, Sourav Stevenson, DO

## 2020-04-29 ENCOUNTER — TELEPHONE (OUTPATIENT)
Dept: MEDICAL GROUP | Facility: LAB | Age: 85
End: 2020-04-29

## 2020-05-06 ENCOUNTER — TELEPHONE (OUTPATIENT)
Dept: MEDICAL GROUP | Facility: LAB | Age: 85
End: 2020-05-06

## 2020-07-09 ENCOUNTER — TELEPHONE (OUTPATIENT)
Dept: MEDICAL GROUP | Facility: LAB | Age: 85
End: 2020-07-09

## 2020-07-13 ENCOUNTER — TELEPHONE (OUTPATIENT)
Dept: MEDICAL GROUP | Facility: LAB | Age: 85
End: 2020-07-13

## 2020-07-13 DIAGNOSIS — L03.119 CELLULITIS OF LOWER EXTREMITY, UNSPECIFIED LATERALITY: ICD-10-CM

## 2020-07-13 RX ORDER — CEPHALEXIN 500 MG/1
500 CAPSULE ORAL 4 TIMES DAILY
Qty: 28 CAP | Refills: 0 | Status: SHIPPED | OUTPATIENT
Start: 2020-07-13 | End: 2020-07-13 | Stop reason: SDUPTHER

## 2020-07-13 RX ORDER — CEPHALEXIN 500 MG/1
500 CAPSULE ORAL 4 TIMES DAILY
Qty: 28 CAP | Refills: 0 | Status: SHIPPED
Start: 2020-07-13 | End: 2020-08-07

## 2020-07-13 NOTE — TELEPHONE ENCOUNTER
Shikha at [facility] notified.  Can you please resend the Rx to [facility]?  It was sent to the wrong pharmacy.

## 2020-07-13 NOTE — TELEPHONE ENCOUNTER
1. Caller Name: Shikha at [facility]                        Call Back Number: [phone]      How would the patient prefer to be contacted with a response: Phone call OK to leave a detailed message    Pt's feet are red, swollen and hot to touch.  She said it looks like cellulitis.  Please advise.

## 2020-07-13 NOTE — TELEPHONE ENCOUNTER
Ana:  These call [facility], have Joshua start Keflex 500 mg 4 times a day immediately, also please schedule to get Joshua into the office as soon as possible (ASAP)  Regards, Sourav Stevenson, DO

## 2020-07-14 NOTE — TELEPHONE ENCOUNTER
Message received and understood, dealing with issues with COVID-19 with concerns of the patient leaving the healthcare facility.

## 2020-07-14 NOTE — TELEPHONE ENCOUNTER
I let Gerardo know that Rx has been sent.  Gerardo is not sure if he can bring him in as he's not even been able to see him for months.  He is not sure if he could even handle the ride at this point because he's been on lock down so long.

## 2020-07-27 NOTE — TELEPHONE ENCOUNTER
Received request via: Pharmacy    Was the patient seen in the last year in this department? Yes    Does the patient have an active prescription (recently filled or refills available) for medication(s) requested? No      Last seen 8/12/19

## 2020-07-28 RX ORDER — QUETIAPINE FUMARATE 25 MG/1
TABLET, FILM COATED ORAL
Qty: 180 TAB | Refills: 0 | Status: SHIPPED | OUTPATIENT
Start: 2020-07-28 | End: 2020-09-09

## 2020-07-30 NOTE — ED NOTES
Physician's Order/Letter of Medical Necessity received for review and completion by PCP, placed paperwork in PCP's mailbox.    Provider: [facility]  Phone: [phone] Ext. [phone]  Fax: [phone] Attn: Pascual Lui  Thank you,  MARLEY MarinelliR   Pt remains resting in bed at this time.  Will continue to monitor.  Family at bedside.

## 2020-08-07 ENCOUNTER — TELEPHONE (OUTPATIENT)
Dept: MEDICAL GROUP | Facility: LAB | Age: 85
End: 2020-08-07

## 2020-08-07 DIAGNOSIS — U07.1 COVID-19 VIRUS INFECTION: ICD-10-CM

## 2020-08-07 DIAGNOSIS — U07.1 COVID-19 VIRUS DETECTED: ICD-10-CM

## 2020-08-07 RX ORDER — AZITHROMYCIN 250 MG/1
TABLET, FILM COATED ORAL
Qty: 6 TAB | Refills: 0 | Status: SHIPPED | OUTPATIENT
Start: 2020-08-07

## 2020-08-07 NOTE — TELEPHONE ENCOUNTER
1. Caller Name: [facility]               Call Back Number: [phone]      How would the patient prefer to be contacted with a response:     Pt tested Positive for COVID, asking for a ZPAK to his pharmacy     aslo fax the order the [phone]

## 2020-08-18 ENCOUNTER — TELEPHONE (OUTPATIENT)
Dept: MEDICAL GROUP | Facility: LAB | Age: 85
End: 2020-08-18

## 2020-08-18 DIAGNOSIS — L89.899 PRESSURE INJURY OF SKIN OF RIGHT FOOT, UNSPECIFIED INJURY STAGE: ICD-10-CM

## 2020-08-18 DIAGNOSIS — L89.219 PRESSURE INJURY OF SKIN OF RIGHT HIP, UNSPECIFIED INJURY STAGE: ICD-10-CM

## 2020-08-18 NOTE — TELEPHONE ENCOUNTER
Speaking with Kamini [facility] nurse. Reports that pt is currently living in lives in Memory Care unit.   Currently being treated for R foot pressure ulcer, though the nursing team at [facility] noticed a new ulcer on his R hip that is worsening.     Requesting Wound care clinic evaluation and treatment for both ulcer on R foot and R hip.   Per Kamini, no fevers/chills. Vitals are stable.   Pt grunts often and it is hard to tell if he is having pain?   Family would like full treatment of pressure ulcers, declined hospice care.       Pt has tested negative for COVID for twice.   Should be okay to transport to wound clinic.     1. Pressure injury of skin of right hip, unspecified injury stage    - REFERRAL TO WOUND CLINIC    2. Pressure injury of skin of right foot, unspecified injury stage  - REFERRAL TO WOUND CLINIC      Gill Umanzor P.A.-C.  Covering for Dr. Stevenson

## 2020-08-24 ENCOUNTER — TELEPHONE (OUTPATIENT)
Dept: MEDICAL GROUP | Facility: LAB | Age: 85
End: 2020-08-24

## 2020-08-24 DIAGNOSIS — G30.1 LATE ONSET ALZHEIMER'S DISEASE WITH BEHAVIORAL DISTURBANCE (HCC): ICD-10-CM

## 2020-08-24 DIAGNOSIS — F02.818 LATE ONSET ALZHEIMER'S DISEASE WITH BEHAVIORAL DISTURBANCE (HCC): ICD-10-CM

## 2020-08-24 RX ORDER — GINSENG 100 MG
1 CAPSULE ORAL 2 TIMES DAILY
Qty: 180 TAB | Refills: 3 | Status: SHIPPED
Start: 2020-08-24

## 2020-08-24 RX ORDER — ASCORBIC ACID 500 MG
500 TABLET ORAL 2 TIMES DAILY
Qty: 180 TAB | Refills: 3 | Status: SHIPPED
Start: 2020-08-24

## 2020-08-24 NOTE — TELEPHONE ENCOUNTER
Phone Number Called: [phone] (home)      Call outcome: Spoke to patient regarding message below.    Message: Spoke to Pt's son (Gerardo) he sts that his father is in assisting living and that he doesn't know if he will be able to make it to his appointment. He does have my chart but it is in Gerardo's phone or PC. He will call the facility to see what they can do for him to be able to have his appointment with Dr. Stevenson. Is there a way that we can do a phone appointment if all else fails? Please advise.

## 2020-09-01 ENCOUNTER — OFFICE VISIT (OUTPATIENT)
Dept: WOUND CARE | Facility: MEDICAL CENTER | Age: 85
End: 2020-09-01
Attending: PHYSICIAN ASSISTANT
Payer: MEDICARE

## 2020-09-01 VITALS
SYSTOLIC BLOOD PRESSURE: 147 MMHG | DIASTOLIC BLOOD PRESSURE: 68 MMHG | TEMPERATURE: 97 F | OXYGEN SATURATION: 90 % | HEART RATE: 63 BPM | RESPIRATION RATE: 20 BRPM

## 2020-09-01 DIAGNOSIS — L89.216 PRESSURE INJURY OF DEEP TISSUE OF RIGHT HIP: ICD-10-CM

## 2020-09-01 DIAGNOSIS — L89.226 PRESSURE INJURY OF DEEP TISSUE OF LEFT HIP: Primary | ICD-10-CM

## 2020-09-01 DIAGNOSIS — R60.9 DEPENDENT EDEMA: ICD-10-CM

## 2020-09-01 DIAGNOSIS — Z51.5 HOSPICE CARE: ICD-10-CM

## 2020-09-01 PROCEDURE — 99214 OFFICE O/P EST MOD 30 MIN: CPT | Performed by: NURSE PRACTITIONER

## 2020-09-01 PROCEDURE — 99215 OFFICE O/P EST HI 40 MIN: CPT

## 2020-09-01 NOTE — LETTER
2020      To: [facility]equiel   1933    Wound care orders:    Remove dressings to 4th and 5th toe wounds.  Rinse all wounds with saline or wound cleanser and pat dry.    Left foot 4th and 5th toe space apply hydrofiber silver or plain hydrofiber , cover with non ad foam and tape if needed.    All eschar on Right hip, Left hip, Left medial heel, Left medial 1st MTH, Right lateral Heel:  Paint with betadine 3% and allow to dry. Make cover with dry gauze or dressing as needed if drainage is occurring.     Please see patient 2-3 times a week for wound care.       Thank you,         ____________________  Geovanny LOFTON

## 2020-09-01 NOTE — PROGRESS NOTES
Provider Encounter- Lower Extremity Ulcer      HISTORY OF PRESENT ILLNESS  Wound History:    START OF CARE IN CLINIC: 9/1/2020    REFERRING PROVIDER: Gill Umanzor     WOUND ETIOLOGY: Pressure Ulcer   LOCATION: Bilateral hips and left 4-5th web space bilateral ankles   HISTORY: Joshua is a resident from [facility] care unit patient has a history significant for Alzheimer's he presented to the clinic with [facility] director Kade.  Patient was referred to [facility] by Gill BIRCH with Dr. Stevenson.  Patient has been receiving care from [facility] who was taking care of his right foot pressure ulcer.  During home health evaluation patient was noticed to have a ulcer to the right hip.  Patient was referred for care and management of multiple ulcers.      Pertinent Medical History: Alzheimer's, C. difficile, hypercholesterolemia           TOBACCO USE: Former smoker quit 1987    Patient's problem list, allergies, and current medications reviewed and updated in Epic    Interval History:  9/1/2020: Clinic visit with ROLLY Neville. Patient states that they are feeling well today.  Patient denies fever, chills, nausea, vomiting, lightheadedness, dizziness, shortness of breath and chest pain.  Patient presented to [facility] with Kade from [facility].  Patient had multiple pressure ulcers to right and left hips, right and left heels, in addition to a pressure injury between the fourth and fifth webspace the left foot.  Had a lengthy discussion with patient's son Gerardo over the phone regarding plan of care.  In my medical opinion I believe that debridement would cause the patient a significant amount of pain with the outcome remaining multiple nonhealing wounds.  Due to the patient's advanced age and age of Alzheimer's.  I recommended that the patient go into hospice care.  I believe this is the most appropriate intervention for this patient at this time.      REVIEW OF SYSTEMS:   Unable to  establish review of systems due to patient's longstanding history of Alzheimer's and dementia.      PHYSICAL EXAMINATION:   /68   Pulse 63   Temp 36.1 °C (97 °F) (Temporal)   Resp 20   SpO2 90%     Physical Exam   Constitutional:   Frail elderly   HENT:   Head: Normocephalic and atraumatic.   Eyes: Pupils are equal, round, and reactive to light. Conjunctivae are normal.   Cardiovascular: Intact distal pulses.   Pulmonary/Chest: Effort normal.   Musculoskeletal:         General: Tenderness and edema present.      Comments: 2+ pitting edema bilateral lower extremities  Tenderness to periwound area   Neurological:   End stage Alzheimer's disease    Skin: No rash noted. There is erythema.   Pressure ulcer to right and left hips  Pressure ulcers to right and left heels  Pressure ulcer between 4th and fifth webspace       WOUND ASSESSMENT       Wound 09/01/20 Pressure Injury Right Hip (Active)   Site Assessment Eschar;Brown;Black 09/01/20 0900   Periwound Assessment Intact 09/01/20 0900   Margins Attached edges 09/01/20 0900   Drainage Amount None 09/01/20 0900   Treatments Site care 09/01/20 0900   Dressing Cleansing/Solutions 3% Betadine;Other (Comments) 09/01/20 0900   Dressing Options Open to Air 09/01/20 0900   Dressing Changed New 09/01/20 0900   Pressure Injury Stage U 09/01/20 0900   Wound Length (cm) 5.8 cm 09/01/20 0900   Wound Width (cm) 4.5 cm 09/01/20 0900   Wound Surface Area (cm^2) 26.1 cm^2 09/01/20 0900       Wound 09/01/20 Pressure Injury Left Hip  (Active)   Site Assessment Eschar;Brown 09/01/20 0900   Periwound Assessment Intact 09/01/20 0900   Margins Attached edges 09/01/20 0900   Drainage Amount None 09/01/20 0900   Treatments Site care 09/01/20 0900   Dressing Cleansing/Solutions 3% Betadine 09/01/20 0900   Dressing Options Open to Air 09/01/20 0900   Pressure Injury Stage U 09/01/20 0900   Wound Length (cm) 4.8 cm 09/01/20 0900   Wound Width (cm) 2.7 cm 09/01/20 0900   Wound Surface Area  (cm^2) 12.96 cm^2 09/01/20 0900   Wound Odor None 09/01/20 0900   Exposed Structures SHAHEED 09/01/20 0900       Wound 09/01/20 Pressure Injury Left Lateral Heel (Active)   Site Assessment Eschar;Brown 09/01/20 0900   Periwound Assessment Intact 09/01/20 0900   Margins Attached edges;Unattached edges 09/01/20 0900   Drainage Amount None 09/01/20 0900   Treatments Site care 09/01/20 0900   Dressing Cleansing/Solutions 3% Betadine 09/01/20 0900   Dressing Options Open to Air 09/01/20 0900   Wound Odor None 09/01/20 0900   Exposed Structures None 09/01/20 0900       Wound 09/01/20 Pressure Injury Right 1st MTH medial  (Active)   Site Assessment Yellow;Brown;Dry;Other (Comment) 09/01/20 0900   Periwound Assessment Intact 09/01/20 0900   Margins Attached edges 09/01/20 0900   Drainage Amount None 09/01/20 0900   Treatments Site care 09/01/20 0900   Dressing Cleansing/Solutions 3% Betadine 09/01/20 0900   Dressing Options Open to Air 09/01/20 0900   Wound Odor None 09/01/20 0900   Exposed Structures None 09/01/20 0900       Wound 09/01/20 Pressure Injury Right Medial Heel  (Active)   Site Assessment Dry;Yellow;Brown;Eschar 09/01/20 0900   Periwound Assessment Intact 09/01/20 0900   Margins Attached edges;Unattached edges 09/01/20 0900   Drainage Amount None 09/01/20 0900   Treatments Site care 09/01/20 0900   Dressing Cleansing/Solutions 3% Betadine 09/01/20 0900   Dressing Options Open to Air 09/01/20 0900   Pressure Injury Stage U 09/01/20 0900   Exposed Structures SHAHEED 09/01/20 0900       Wound 09/01/20 Left 4th toe Lateral  (Active)   Wound Image   09/01/20 0900   Site Assessment Pink;White 09/01/20 0900   Periwound Assessment Intact;Blanchable erythema 09/01/20 0900   Margins Attached edges 09/01/20 0900   Drainage Amount SHAHEED 09/01/20 0900   Treatments Cleansed;Site care 09/01/20 0900   Wound Cleansing Normal Saline Irrigation 09/01/20 0900   Periwound Protectant Skin Protectant Wipes to Periwound 09/01/20 0900   Dressing  Cleansing/Solutions Not Applicable 09/01/20 0900   Dressing Options Hydrofiber Silver;Hypafix Tape 09/01/20 0900   Dressing Changed New 09/01/20 0900   Dressing Status Clean;Dry;Intact 09/01/20 0900   Non-staged Wound Description Full thickness 09/01/20 0900   Wound Length (cm) 0.3 cm 09/01/20 0900   Wound Width (cm) 0.3 cm 09/01/20 0900   Wound Depth (cm) 0.1 cm 09/01/20 0900   Wound Surface Area (cm^2) 0.09 cm^2 09/01/20 0900   Wound Volume (cm^3) 0.01 cm^3 09/01/20 0900   Tunneling (cm) 0 cm 09/01/20 0900   Undermining (cm) 0 cm 09/01/20 0900   Wound Odor None 09/01/20 0900   Exposed Structures None 09/01/20 0900       Wound 09/01/20 Left 5th Medial toe  (Active)   Wound Image   09/01/20 0900   Site Assessment Pink;White 09/01/20 0900   Periwound Assessment Intact;Blanchable erythema 09/01/20 0900   Margins Attached edges 09/01/20 0900   Drainage Amount SHAHEED 09/01/20 0900   Treatments Cleansed;Site care 09/01/20 0900   Wound Cleansing Normal Saline Irrigation 09/01/20 0900   Periwound Protectant Skin Protectant Wipes to Periwound 09/01/20 0900   Dressing Options Hydrofiber Silver;Hypafix Tape 09/01/20 0900   Dressing Changed New 09/01/20 0900   Dressing Status Clean;Dry;Intact 09/01/20 0900   Non-staged Wound Description Full thickness 09/01/20 0900   Wound Length (cm) 0.2 cm 09/01/20 0900   Wound Width (cm) 0.2 cm 09/01/20 0900   Wound Depth (cm) 0.1 cm 09/01/20 0900   Wound Surface Area (cm^2) 0.04 cm^2 09/01/20 0900   Wound Volume (cm^3) 0 cm^3 09/01/20 0900   Tunneling (cm) 0 cm 09/01/20 0900   Wound Odor None 09/01/20 0900   Exposed Structures None 09/01/20 0900            PROCEDURE:   No debridement performed in clinic today.  I had a thorough discussion with the patient's son due to the fact that the patient has severe Alzheimer's and his son is the POA.  I discussed various options with the son.  I do not believe that debridement is warranted due to the age of the patient and the degree of Alzheimer's  disease.  It is my medical opinion that debridement would cause the patient more pain than benefit and would not serve as a viable healing outcome.  I had a thorough discussion with the son regarding possible transition into hospice.  Son is to talk with Dr. Stevenson later this week to affirm that this is a reasonable and prudent option at this time for his father.      Pertinent Labs and Diagnostics:    Labs:     IMAGING: NA    VASCULAR STUDIES: N/A    LAST  WOUND CULTURE:  DATE : N/A           ASSESSMENT AND PLAN:   1. Pressure injury of deep tissue of left hip  Comment: Patient developed a deep tissue injury to left hip, following initial injury to right hip.  In an attempt to offload the right hip [facility] memory unit was turning the patient every hour (according to Kade the director).  Even with turning the patient every hour the patient developed a second ulcer to the left hip.  Left hip wound is a unstageable pressure ulcer with periwound suspected deep tissue injury.   -Painted with Betadine to harden And reduce access to wound bed by bacteria  -Continue to turn patient frequently at least every hour  -Consider low air loss mattress if patient is comfortable    2. Pressure injury of deep tissue of right hip  Comment: Right hip unstageable pressure ulcer with eschar    3. Dependent edema  Patient with bilateral lower extremity edema.  -RN replaced patient's socks this seemed to create some distress for the patient.  Therefore, we did not attempt to place compression Tubigrip's to the patient's bilateral lower extremities.  We did not want to excite or irritate the patient in any way.  -Elevate bilateral lower extremities throughout the day above the level of the heart.  -Compression stocking or Tubigrip if patient allows and if he is comfortable with stocking in place.    4. Hospice care  -Comments: Patient's son Gerardo is to talk with Dr. Stevenson later this week I believe on Thursday to determine if hospice  care is appropriate for his father.  However, I did read in Erna's notes that the family has already addressed hospice.  It did not seem that the time of this clinic appointment that the son had previously discussed this in detail.  However, I do believe that he agrees that hospice is appropriate.  He would like confirmation from Dr. Stevenson before proceeding.            30 min spent face to face with patient, >50% of time spent counseling, coordinating care, reviewing records, discussing POC, educating patient regarding vascular disease, wound healing and progression. This time was spent in excess to procedure time.       Please note that this note may have been created using voice recognition software. I have worked with technical experts from [employer] [facility] [employer] to optimize the interface.  I have made every reasonable attempt to correct obvious errors, but there may be errors of grammar and possibly     N

## 2020-09-03 ENCOUNTER — TELEMEDICINE (OUTPATIENT)
Dept: MEDICAL GROUP | Facility: LAB | Age: 85
End: 2020-09-03
Payer: MEDICARE

## 2020-09-03 ENCOUNTER — TELEPHONE (OUTPATIENT)
Dept: MEDICAL GROUP | Facility: LAB | Age: 85
End: 2020-09-03

## 2020-09-03 DIAGNOSIS — N18.30 CHRONIC KIDNEY DISEASE, STAGE III (MODERATE): ICD-10-CM

## 2020-09-03 DIAGNOSIS — L89.40 PRESSURE INJURY OF SKIN OF CONTIGUOUS REGION INVOLVING BUTTOCK AND HIP, UNSPECIFIED INJURY STAGE, UNSPECIFIED LATERALITY: ICD-10-CM

## 2020-09-03 DIAGNOSIS — Z51.5 HOSPICE CARE: ICD-10-CM

## 2020-09-03 PROCEDURE — 99213 OFFICE O/P EST LOW 20 MIN: CPT | Mod: 95,CR | Performed by: INTERNAL MEDICINE

## 2020-09-03 NOTE — PROGRESS NOTES
Telemedicine Video Visit: Established Patient   This Remote Face to Face encounter was conducted via Zoom. Given the importance of social distancing and other strategies recommended to reduce the risk of COVID-19 transmission, I am providing medical care to this patient via audio/video visit in place of an in person visit at the request of the patient. Verbal consent to telehealth, risks, benefits, and consequences were discussed. Patient retains the right to withdraw at any time. All existing confidentiality protections apply. The patient has access to all transmitted medical information. No dissemination of any patient images or information to other entities without further written consent.  Subjective:     Chief Complaint   Patient presents with   • Follow-Up       Joshua Nieevs is a 87 y.o. male presenting for evaluation and management of:    Patient was visited using [employer] through [employer] cell phone.  Patient is not oriented, is in a memory care unit, discussed with staff hospice care which appears to be agreeable also with Ced his son.  Orders signed today.    ROS    Per care unit staff no recent fevers or chills. No nausea or vomiting. No chest pains or shortness of breath.     Allergies   Allergen Reactions   • Spironolactone Diarrhea     Severe diarrhea   • Atorvastatin      Causes low quality       Current medicines (including changes today)  Current Outpatient Medications   Medication Sig Dispense Refill   • memantine (NAMENDA) 10 MG Tab TAKE 1 TABLET BY MOUTH TWICE DAILY 60 Tab 5   • ascorbic acid (VITAMIN C) 500 MG tablet Take 1 Tab by mouth 2 times a day. 180 Tab 3   • Zinc 50 MG Tab Take 1 Tab by mouth 2 Times a Day. 180 Tab 3   • Nutritional Supplements (ENSURE COMPLETE SHAKE) Liquid Take 1 Each by mouth 3 times a day as needed. 33087 mL 11   • azithromycin (ZITHROMAX) 250 MG Tab Two day one then qd x 4. (Patient not taking: Reported on 9/1/2020) 6 Tab 0   • QUEtiapine  "(SEROQUEL) 25 MG Tab TAKE THREE TABLETS BY MOUTH TWO TIMES A DAY  180 Tab 0   • RANEXA 500 MG TABLET SR 12 HR take 1 tablet by mouth twice daily 180 Tab 2   • Calcium Alginate (MICK CA ALGINATE 4\"X4\") Misc 1 Each by Apply externally route 2X A WEEK. 8 Each 3   • traZODone (DESYREL) 100 MG Tab TAKE ONE TABLET BY MOUTH AT BEDTIME  30 Tab 9   • escitalopram (LEXAPRO) 20 MG tablet TAKE ONE TABLET BY MOUTH ONE TIME DAILY  90 Tab 2   • aspirin (ASA) 81 MG Chew Tab chewable tablet Take 81 mg by mouth every day.     • DILTIAZem CD (CARDIZEM CD) 120 MG CAPSULE SR 24 HR Take 120 mg by mouth every day.     • ferrous sulfate 325 (65 Fe) MG tablet Take 325 mg by mouth every day.     • acetaminophen (TYLENOL) 500 MG Tab Take 500 mg by mouth every 6 hours as needed for Mild Pain.     • Cholecalciferol (VITAMIN D) 2000 UNIT Tab Take 2,000 Units by mouth every day.       No current facility-administered medications for this visit.        Patient Active Problem List    Diagnosis Date Noted   • Clostridium difficile infection 10/26/2019     Priority: High   • Closed fracture of neck of right femur (HCC) 04/09/2019     Priority: High   • Delirium with dementia 04/11/2019     Priority: Medium   • Acute blood loss as cause of postoperative anemia 04/11/2019     Priority: Medium   • Acute cystitis without hematuria 04/09/2019     Priority: Medium   • Vitamin D deficiency disease 08/04/2017     Priority: Medium   • Late onset Alzheimer's disease with behavioral disturbance (HCC) 01/31/2018     Priority: Low   • Angina at rest (HCC) 08/08/2017   • Coronary artery disease involving native coronary artery of native heart without angina pectoris 08/08/2017   • Pure hypercholesterolemia 08/08/2017   • Abnormal CBC 08/04/2017       History reviewed. No pertinent family history.    He  has a past medical history of Abnormal CBC (8/4/2017), HTN (hypertension) (04/09/2019), Late onset Alzheimer's disease with behavioral disturbance (HCC) " (1/31/2018), and Vitamin D deficiency disease (8/4/2017).  He  has a past surgical history that includes hip cannulated screw (Right, 4/9/2019) and other cardiac surgery.       Objective:   Vitals obtained by patient:  There were no vitals taken for this visit.    Physical Exam:   Constitutional: Alert not oriented  Skin: No rashes in visible areas.  Eye: Round. Conjunctiva clear, lids normal. No icterus.   ENMT: Lips pink without lesions, good dentition, moist mucous membranes. Phonation normal.  Neck: No masses, no thyromegaly. Moves freely without pain.  CV: Pulse as reported by patient  Respiratory: Unlabored respiratory effort, no cough or audible wheeze  Psych: Alert not oriented.      Assessment and Plan:   The following treatment plan was discussed:     1. Hospice care  - REFERRAL TO HOSPICE    2. Pressure injury of skin of contiguous region involving buttock and hip, unspecified injury stage, unspecified laterality  Continue wound care as necessary    3. Chronic kidney disease, stage III (moderate) ([facility])  Ongoing from October 30, 2019      Follow-up: No follow-ups on file.    Face to Face Video Visit:   I spent 10 minutes with patient/guardian and I conducted this visit with audio and video present.  Sourav Stevenson D.O.

## 2020-09-03 NOTE — TELEPHONE ENCOUNTER
----- Message from Sourav Stevenson D.O. sent at 9/3/2020 11:52 AM PDT -----  Joleen:  Please fax to the [facility] unit a copy of the order entering Joshua into hospice care.  Regards, Sourav Stevenson, DO

## 2020-09-17 RX ORDER — QUETIAPINE FUMARATE 25 MG/1
TABLET, FILM COATED ORAL
Qty: 180 TAB | Refills: 5 | OUTPATIENT
Start: 2020-09-17

## 2020-12-07 ENCOUNTER — TELEPHONE (OUTPATIENT)
Dept: MEDICAL GROUP | Facility: LAB | Age: 85
End: 2020-12-07

## 2023-01-21 NOTE — TELEPHONE ENCOUNTER
Oziel left first day of home care [phone]  patient will need a wound care order for his Left leg/foot for debris.    None

## 2023-09-15 NOTE — CARE PLAN
Problem: Communication  Goal: The ability to communicate needs accurately and effectively will improve  Outcome: PROGRESSING AS EXPECTED  Note:   Pt oriented to self only, complicating communication slightly. Pt is able to answer simple questions w/ certainty but does not demonstrate use of call bell system. Door open, call bell reinforced, Q1hr rounding in place.      Problem: Bowel/Gastric:  Goal: Normal bowel function is maintained or improved  Outcome: PROGRESSING AS EXPECTED  Goal: Will not experience complications related to bowel motility  Outcome: PROGRESSING AS EXPECTED  Note:   Admitted w/ Cdiff positive stool, oral vanco Q6hrs. Pt has had no Bms this shift, denies symptoms.      Problem: Psychosocial Needs:  Goal: Level of anxiety will decrease  Outcome: PROGRESSING AS EXPECTED  Note:   Pt is intermittently agitated today but seems to calm w/out significant intervention.     Merry Prather        Hypomagnesemia Metabolic acidosis Hypomagnesemia

## 2023-11-11 NOTE — PROGRESS NOTES
CC: Joshua Nieves is a 84 y.o. male is suffering from   Chief Complaint   Patient presents with   • Follow-Up     3 months         SUBJECTIVE:  1. Late onset Alzheimer's disease with behavioral disturbance  Joshua is here accompanied by Gerardo today, is doing well at his care facility still gives us on a hard time. States that he lost his glasses, apparently the care facility staff is looking for them.     Past social, family, history: , son Gerardo is present  Social History   Substance Use Topics   • Smoking status: Former Smoker     Quit date: 1/1/1987   • Smokeless tobacco: Never Used   • Alcohol use No         MEDICATIONS:    Current Outpatient Prescriptions:   •  quetiapine (SEROQUEL) 50 MG tablet, Take 1 Tab by mouth 2 times a day., Disp: 60 Tab, Rfl: 11  •  memantine (NAMENDA) 10 MG Tab, Take 1 Tab by mouth 2 times a day., Disp: 180 Tab, Rfl: 3  •  escitalopram (LEXAPRO) 20 MG tablet, Take 1 Tab by mouth every day., Disp: 90 Tab, Rfl: 3  •  trazodone (DESYREL) 100 MG Tab, TAKE 1 TABLET BY MOUTH AT BEDTIME, Disp: 30 Tab, Rfl: 11  •  ranolazine (RANEXA) 500 MG TABLET SR 12 HR, Take 1 Tab by mouth 2 times a day., Disp: 60 Tab, Rfl: 11  •  atorvastatin (LIPITOR) 40 MG Tab, Take 1 Tab by mouth every day., Disp: 90 Tab, Rfl: 3  •  diltiazem CD (CARDIZEM CD) 120 MG CAPSULE SR 24 HR, Take 1 Cap by mouth every day., Disp: 30 Cap, Rfl: 11  •  acetaminophen (TYLENOL) 325 MG Tab, Take 2 Tabs by mouth every four hours as needed (fever/pain)., Disp: 30 Tab, Rfl: 3  •  amoxicillin-clavulanate (AUGMENTIN) 875-125 MG Tab, Take 1 Tab by mouth 2 times a day., Disp: 14 Tab, Rfl: 0    PROBLEMS:  Patient Active Problem List    Diagnosis Date Noted   • Late onset Alzheimer's disease with behavioral disturbance 01/31/2018   • Angina at rest (CMS-HCC) 08/08/2017   • Coronary artery disease involving native coronary artery of native heart without angina pectoris 08/08/2017   • Pure hypercholesterolemia 08/08/2017   • Vitamin D  PATIENT DISCHARGE EDUCATION INSTRUCTION SHEET    REASONS TO CALL YOUR OBSTETRICIAN  Persistent fever, shaking, chills (Temperature higher than 100.4) may indicate you have an infection  Heavy bleeding: soaking more than 1 pad per hour; Passing clots an egg-sized clot or bigger may mean you have an postpartum hemorrhage  Foul odor from vagina or bad smelling or discolored discharge or blood  Breast infection (Mastitis symptoms); breast pain, chills, fever, redness or red streaks, may feel flu like symptoms  Urinary pain, burning or frequency  Incision that is not healing, increased redness, swelling, tenderness or pain, or any pus from episiotomy or  site may mean you have an infection  Redness, swelling, warmth, or painful to touch in the calf area of your leg may mean you have a blood clot  Severe or intensified depression, thoughts or feelings of wanting to hurt yourself or someone else   Pain in chest, obstructed breathing or shortness of breath (trouble catching your breath) may mean you are having a postpartum complication. Call your provider immediately   Headache that does not get better, even after taking medicine, a bad headache with vision changes or pain in the upper right area of your belly may mean you have high blood pressure or post birth preeclampsia. Call your provider immediately    HAND WASHING  All family and friends should wash their hands:  Before and after holding the baby  Before feeding the baby  After using the restroom or changing the baby's diaper    WOUND CARE  Ask your physician for additional care instructions. In general:   Incision:  May shower and pat incision dry   Keep the incision clean and dry  There should not be any opening or pus from the incision  Continue to walk at home 3 times a day   Do NOT lift anything heavier than your baby (over 10 pounds)  Encourage family to help participate in care of the  to allow rest and mom time to  "deficiency disease 08/04/2017   • Abnormal CBC 08/04/2017       REVIEW OF SYSTEMS:  Gen.:  No Nausea, Vomiting, fever, Chills.  Heart: No chest pain.  Lungs:  No shortness of Breath.  Psychological: Anjel unusual Anxiety depression     PHYSICAL EXAM   Constitutional: Alert, cooperative, not in acute distress.  Cardiovascular:  Rate Rhythm is regular without murmurs rubs clicks.     Thorax & Lungs: Clear to auscultation, no wheezing, rhonchi, or rales  HENT: Normocephalic, Atraumatic.  Eyes: PERRLA, EOMI, Conjunctiva normal.   Neck: Trachia is midline no swelling of the thyroid.   Lymphatic: No lymphadenopathy noted.   Neurologic: Alert but not oriented, cranial nerves II through XII are intact.   Psychiatric: Patient is fatigued but cooperative.     VITAL SIGNS:/88   Pulse 74   Temp 36.4 °C (97.5 °F)   Resp 16   Ht 1.727 m (5' 8\")   Wt 90.7 kg (200 lb)   SpO2 94%   BMI 30.41 kg/m²     Labs: Reviewed    Assessment:                                                     Plan:    1. Late onset Alzheimer's disease with behavioral disturbance  Alzheimer's no change in medical therapy        " heal  Episiotomy/Laceration  May use kerrie-spray bottle, witch hazel pads and dermaplast spray for comfort  Use kerrie-spray bottle after urinating to cleanse perineal area  To prevent burning during urination spray kerrie-water bottle on labial area   Pat perineal area dry until episiotomy/laceration is healed  Continue to use kerrie-bottle until bleeding stops as needed  If have a 2nd degree laceration or greater, a Sitz bath can offer relief from soreness, burning, and inflammation   Sitz Bath   Sit in 6 inches of warm water and soak laceration as needed until the laceration heals    VAGINAL CARE AND BLEEDING  Nothing inside vagina for 6 weeks:   No sexual intercourse, tampons or douching  Bleeding may continue for 2-4 weeks. Amount and color may vary  Soaking 1 pad or more in an hour for several hours is considered heavy bleeding  Passing large egg sized blood clots can be concerning  If you feel like you have heavy bleeding or are having increasing amount of blood clots call your Obstetrician immediately  If you begin feeling faint upon standing, feeling sick to your stomach, have clammy skin, a really fast heartbeat, have chills, start feeling confused, dizzy, sleepy or weak, or feeling like you're going to faint call your Obstetrician immediately    HYPERTENSION   Preeclampsia or gestational hypertension are types of high blood pressure that only pregnant women can get. It is important for you to be aware of symptoms to seek early intervention and treatment. If you have any of these symptoms immediately call your Obstetrician    Vision changes or blurred vision   Severe headache or pain that is unrelieved with medication and will not go away  Persistent pain in upper abdomen or shoulder   Increased swelling of face, feet, or hands  Difficulty breathing or shortness of breath at rest  Urinating less than usual    URINATION AND BOWEL MOVEMENTS  Eating more fiber (bran cereal, fruits, and vegetables) and drinking  "plenty of fluids will help to avoid constipation  Urinary frequency and urgency after childbirth is normal  If you experience any urinary pain, burning or frequency call your provider    BIRTH CONTROL  It is possible to become pregnant at any time after delivery and while breastfeeding  Plan to discuss a method of birth control with your physician at your post delivery follow up visit    POSTPARTUM BLUES  During the first few days after birth, you may experience a sense of the \"blues\" which may include impatience, irritability or even crying. These feelings come and go quickly. However, as many as 1 in 10 women experience emotional symptoms known as postpartum depression.     POSTPARTUM DEPRESSION    May start as early as the second or third day after delivery or take several weeks or months to develop. Symptoms of \"blues\" are present, but are more intense: Crying spells; loss of appetite; feelings of hopelessness or loss of control; fear of touching the baby; over concern or no concern at all about the baby; little or no concern about your own appearance/caring for yourself; and/or inability to sleep or excessive sleeping. Contact your Obstetrician if you are experiencing any of these symptoms     PREVENTING SHAKEN BABY  If you are angry or stressed, PUT THE BABY IN THE CRIB, step away, take some deep breaths, and wait until you are calm to care for the baby. DO NOT SHAKE THE BABY. You are not alone, call a supporter for help.  [facility] 24/7 crisis call line ([phone]) or (1-726.346.9992)  You can also text them, text \"ANSWER\" (160594)  "

## 2024-03-29 NOTE — MR AVS SNAPSHOT
"        Joshua MCFARLANE Ezequiel   2017 9:20 AM   Office Visit   MRN: [id number]    Department:  [facility]   Dept Phone:  [phone]    Description:  Male : 1933   Provider:  Sourav Stevenson D.O.           Reason for Visit     Annual Exam Physical Exam       Allergies as of 2017     Not on File      You were diagnosed with     Memory changes   [638932]       Dyslipidemia   [247274]       Essential hypertension   [2662479]       Cardiovascular disease   [824971]       History of laparoscopic adjustable gastric banding   [331415]         Vital Signs     Blood Pressure Pulse Temperature Respirations Height Weight    125/85 mmHg 81 36.8 °C (98.2 °F) 18 1.727 m (5' 8\") 87.68 kg (193 lb 4.8 oz)    Body Mass Index Oxygen Saturation Smoking Status             29.40 kg/m2 96% Former Smoker         Basic Information     Date Of Birth Sex Race Ethnicity Preferred Language    1933 Male White Non- English      Your appointments     Aug 04, 2017  8:00 AM   Established Patient with Sourav Stevenson D.O.   [address] [address]   [phone]           You will be receiving a confirmation call a few days before your appointment from our automated call confirmation system.              Health Maintenance     Patient has no pending health maintenance at this time      Current Immunizations     Tuberculin Skin Test 2017  9:52 AM, 2017  9:26 AM      Below and/or attached are the medications your provider expects you to take. Review all of your home medications and newly ordered medications with your provider and/or pharmacist. Follow medication instructions as directed by your provider and/or pharmacist. Please keep your medication list with you and share with your provider. Update the information when medications are discontinued, doses are changed, or new medications (including over-the-counter products) are added; and carry " medication information at all times in the event of emergency situations     Allergies:  No Known Allergies          Medications  Valid as of: June 22, 2017 - 10:10 AM    Generic Name Brand Name Tablet Size Instructions for use    Atorvastatin Calcium (Tab) LIPITOR 40 MG Take 1 Tab by mouth every day.        .                 Medicines prescribed today were sent to:     [id number] - [place], [place] - [address]    [address] [place] [address]    Phone: [phone] Fax: [phone]    Open 24 Hours?: No      Medication refill instructions:       If your prescription bottle indicates you have medication refills left, it is not necessary to call your provider’s office. Please contact your pharmacy and they will refill your medication.    If your prescription bottle indicates you do not have any refills left, you may request refills at any time through one of the following ways: The online [employer] system (except Urgent Care), by calling your provider’s office, or by asking your pharmacy to contact your provider’s office with a refill request. Medication refills are processed only during regular business hours and may not be available until the next business day. Your provider may request additional information or to have a follow-up visit with you prior to refilling your medication.   *Please Note: Medication refills are assigned a new Rx number when refilled electronically. Your pharmacy may indicate that no refills were authorized even though a new prescription for the same medication is available at the pharmacy. Please request the medicine by name with the pharmacy before contacting your provider for a refill.        Your To Do List     Future Labs/Procedures Complete By Expires    CBC WITH DIFFERENTIAL  As directed 6/23/2018    COMP METABOLIC PANEL  As directed 6/23/2018    COMP METABOLIC PANEL  As directed 6/23/2018    FREE THYROXINE  As directed 6/23/2018    FREE THYROXINE  As directed 6/23/2018     LIPID PROFILE  As directed 6/23/2018    TSH  As directed 6/23/2018    TSH  As directed 6/23/2018    VITAMIN B12  As directed 6/22/2018    VITAMIN D,25 HYDROXY  As directed 6/23/2018      Referral     A referral request has been sent to our patient care coordination department. Please allow 3-5 business days for us to process this request and contact you either by phone or mail. If you do not hear from us by the 5th business day, please call us at [phone].           CHEQROOM Access Code: [email or web address]  Expires: 7/12/2017 11:03 AM    Your email address is not on file at [facility].  Email Addresses are required for you to sign up for CHEQROOM, please contact [phone] to verify your personal information and to provide your email address prior to attempting to register for CHEQROOM.    Joshua Nieves  [address] [place] [id number]    CHEQROOM  A secure, online tool to manage your health information     [facility]’s CHEQROOM® is a secure, online tool that connects you to your personalized health information from the privacy of your home -- day or night - making it very easy for you to manage your healthcare. Once the activation process is completed, you can even access your medical information using the CHEQROOM steve, which is available for free in the Apple Steve store or Google Play store.     To learn more about CHEQROOM, visit [email or web address]    There are two levels of access available (as shown below):   My Chart Features  [facility] Primary Care Doctor [facility]  Specialists [facility]  Urgent  Care Non-[facility] Primary Care Doctor   Email your healthcare team securely and privately 24/7 X X X    Manage appointments: schedule your next appointment; view details of past/upcoming appointments X      Request prescription refills. X      View recent personal medical records, including lab and immunizations X X X X   View health record, including health history, allergies, medications X X X X   Read  reports about your outpatient visits, procedures, consult and ER notes X X X X   See your discharge summary, which is a recap of your hospital and/or ER visit that includes your diagnosis, lab results, and care plan X X  X     How to register for [employer]:  Once your e-mail address has been verified, follow the following steps to sign up for [employer].     1. Go to  [email or web address]  2. Click on the Sign Up Now box, which takes you to the New Member Sign Up page. You will need to provide the following information:  a. Enter your [employer] Access Code exactly as it appears at the top of this page. (You will not need to use this code after you’ve completed the sign-up process. If you do not sign up before the expiration date, you must request a new code.)   b. Enter your date of birth.   c. Enter your home email address.   d. Click Submit, and follow the next screen’s instructions.  3. Create a [employer] ID. This will be your [employer] login ID and cannot be changed, so think of one that is secure and easy to remember.  4. Create a [employer] password. You can change your password at any time.  5. Enter your Password Reset Question and Answer. This can be used at a later time if you forget your password.   6. Enter your e-mail address. This allows you to receive e-mail notifications when new information is available in [employer].  7. Click Sign Up. You can now view your health information.    For assistance activating your [employer] account, call [phone]          Vaccine status unknown

## 2025-06-06 NOTE — ED NOTES
I retract the message about cardiology. Paul also had large PE a few years ago.  And also DVT.  So I'd have to have an OV with the patient to discuss. MAT   Med rec updated and complete.  Allergies reviewed. Interviewed family ( son) currently at bedside.  Pt is unable to participate in an interview at this time. Son denies oral   Antibiotic use in last 30 days at home.

## (undated) DEVICE — DRAPE LARGE 3 QUARTER - (20/CA)

## (undated) DEVICE — KIT ROOM DECONTAMINATION

## (undated) DEVICE — CHLORAPREP 26 ML APPLICATOR - ORANGE TINT(25/CA)

## (undated) DEVICE — SUTURE 2-0 VICRYL PLUS CT-1 - 8 X 18 INCH(12/BX)

## (undated) DEVICE — SET EXTENSION WITH 2 PORTS (48EA/CA) ***PART #2C8610 IS A SUBSTITUTE*****

## (undated) DEVICE — SENSOR SPO2 ADULT LNCS ADTX (20/BX) ORDER ITEM #19593

## (undated) DEVICE — GLOVE BIOGEL PI INDICATOR SZ 6.5 SURGICAL PF LF - (50/BX 4BX/CA)

## (undated) DEVICE — GLOVE BIOGEL SZ 8 SURGICAL PF LTX - (50PR/BX 4BX/CA)

## (undated) DEVICE — GLOVE BIOGEL INDICATOR SZ 7.5 SURGICAL PF LTX - (50PR/BX 4BX/CA)

## (undated) DEVICE — TUBING CLEARLINK DUO-VENT - C-FLO (48EA/CA)

## (undated) DEVICE — LACTATED RINGERS INJ 1000 ML - (14EA/CA 60CA/PF)

## (undated) DEVICE — ELECTRODE DUAL RETURN W/ CORD - (50/PK)

## (undated) DEVICE — TOWELS CLOTH SURGICAL - (4/PK 20PK/CA)

## (undated) DEVICE — SLEEVE, VASO, THIGH, MED

## (undated) DEVICE — GLOVE BIOGEL SZ 7.5 SURGICAL PF LTX - (50PR/BX 4BX/CA)

## (undated) DEVICE — CANISTER SUCTION 3000ML MECHANICAL FILTER AUTO SHUTOFF MEDI-VAC NONSTERILE LF DISP  (40EA/CA)

## (undated) DEVICE — SUTURE GENERAL

## (undated) DEVICE — SENSOR SPO2 NEO LNCS ADHESIVE (20/BX) SEE USER NOTES

## (undated) DEVICE — PROTECTOR ULNA NERVE - (36PR/CA)

## (undated) DEVICE — GLOVE BIOGEL INDICATOR SZ 8 SURGICAL PF LTX - (50/BX 4BX/CA)

## (undated) DEVICE — GLOVE SZ 7 BIOGEL PI MICRO - PF LF (50PR/BX 4BX/CA)

## (undated) DEVICE — DRAPE C-ARM LARGE 41IN X 74 IN - (10/BX 2BX/CA)

## (undated) DEVICE — DRAPE U ORTHOPEDIC - (10/BX)

## (undated) DEVICE — TUBE E-T HI-LO CUFF 8.0MM (10EA/PK)

## (undated) DEVICE — ELECTRODE 850 FOAM ADHESIVE - HYDROGEL RADIOTRNSPRNT (50/PK)

## (undated) DEVICE — NEPTUNE 4 PORT MANIFOLD - (20/PK)

## (undated) DEVICE — DRAPE 36X28IN RAD CARM BND BG - (25/CA) O

## (undated) DEVICE — GOWN WARMING STANDARD FLEX - (30/CA)

## (undated) DEVICE — PACK MAJOR ORTHO - (2EA/CA)

## (undated) DEVICE — SET LEADWIRE 5 LEAD BEDSIDE DISPOSABLE ECG (1SET OF 5/EA)

## (undated) DEVICE — HEAD HOLDER JUNIOR/ADULT

## (undated) DEVICE — KIT ANESTHESIA W/CIRCUIT & 3/LT BAG W/FILTER (20EA/CA)

## (undated) DEVICE — GLOVE BIOGEL PI INDICATOR SZ 8.0 SURGICAL PF LF -(50/BX 4BX/CA)

## (undated) DEVICE — DRESSING TRANSPARENT FILM TEGADERM 4 X 4.75" (50EA/BX)"

## (undated) DEVICE — GLOVE SZ 7.5 BIOGEL PI MICRO - PF LF (50PR/BX)

## (undated) DEVICE — MASK ANESTHESIA ADULT  - (100/CA)

## (undated) DEVICE — SUCTION INSTRUMENT YANKAUER BULBOUS TIP W/O VENT (50EA/CA)

## (undated) DEVICE — DRAPE SURGICAL U 77X120 - (10/CA)